# Patient Record
Sex: MALE | Race: WHITE | NOT HISPANIC OR LATINO | Employment: FULL TIME | URBAN - METROPOLITAN AREA
[De-identification: names, ages, dates, MRNs, and addresses within clinical notes are randomized per-mention and may not be internally consistent; named-entity substitution may affect disease eponyms.]

---

## 2022-02-07 ENCOUNTER — OFFICE VISIT (OUTPATIENT)
Dept: ORTHOPEDIC SURGERY | Facility: CLINIC | Age: 62
End: 2022-02-07

## 2022-02-07 VITALS — TEMPERATURE: 96.9 F | BODY MASS INDEX: 32.27 KG/M2 | WEIGHT: 265 LBS | HEIGHT: 76 IN

## 2022-02-07 DIAGNOSIS — M19.011 PRIMARY LOCALIZED OSTEOARTHROSIS OF RIGHT SHOULDER REGION: Primary | ICD-10-CM

## 2022-02-07 PROCEDURE — 20610 DRAIN/INJ JOINT/BURSA W/O US: CPT | Performed by: ORTHOPAEDIC SURGERY

## 2022-02-07 PROCEDURE — 99204 OFFICE O/P NEW MOD 45 MIN: CPT | Performed by: ORTHOPAEDIC SURGERY

## 2022-02-07 PROCEDURE — 73030 X-RAY EXAM OF SHOULDER: CPT | Performed by: ORTHOPAEDIC SURGERY

## 2022-02-07 RX ORDER — MELOXICAM 15 MG/1
TABLET ORAL
Qty: 30 TABLET | Refills: 0 | Status: SHIPPED | OUTPATIENT
Start: 2022-02-07 | End: 2022-03-01

## 2022-02-07 RX ORDER — CITALOPRAM 20 MG/1
20 TABLET ORAL DAILY
COMMUNITY
End: 2022-04-18 | Stop reason: SDUPTHER

## 2022-02-07 RX ORDER — LISINOPRIL 10 MG/1
10 TABLET ORAL DAILY
COMMUNITY
End: 2022-04-18 | Stop reason: SDUPTHER

## 2022-02-07 RX ORDER — GABAPENTIN 400 MG/1
400 CAPSULE ORAL DAILY
COMMUNITY
Start: 2021-09-15 | End: 2022-07-14 | Stop reason: SDUPTHER

## 2022-02-07 RX ORDER — ATORVASTATIN CALCIUM 20 MG/1
1 TABLET, FILM COATED ORAL DAILY
COMMUNITY
Start: 2021-09-15 | End: 2022-04-18 | Stop reason: SDUPTHER

## 2022-02-07 RX ORDER — GLIPIZIDE 10 MG/1
10 TABLET, FILM COATED, EXTENDED RELEASE ORAL
COMMUNITY
Start: 2021-09-15 | End: 2022-03-29

## 2022-02-07 RX ADMIN — TRIAMCINOLONE ACETONIDE 40 MG: 40 INJECTION, SUSPENSION INTRA-ARTICULAR; INTRAMUSCULAR at 15:27

## 2022-02-07 NOTE — PROGRESS NOTES
New  Right Shoulder      Patient: Collin Sandoval        YOB: 1960    Medical Record Number: [id number]        Chief Complaints: Right shoulder pain       History of Present Illness: This is a 61-year-old male who presents complaining of right shoulder pain has been ongoing about 4 years but within the last year symptoms are worse he is a  he has significant at night pain he played football in high school and had shoulder injuries at that time.  His pain is an 8 out of 10 moderate intermittent shooting worse with activity somewhat better with rest past medical history is unremarkable      Allergies: No Known Allergies    Medications:   Home Medications:  Current Outpatient Medications on File Prior to Visit   Medication Sig   • atorvastatin (LIPITOR) 20 MG tablet Take 1 tablet by mouth Daily.   • citalopram (CeleXA) 20 MG tablet Take 20 mg by mouth Daily.   • empagliflozin (Jardiance) 25 MG tablet tablet Take 25 mg by mouth Daily.   • gabapentin (NEURONTIN) 400 MG capsule Take 400 mg by mouth.   • glipizide (GLUCOTROL XL) 10 MG 24 hr tablet Take 10 mg by mouth.   • lisinopril (PRINIVIL,ZESTRIL) 10 MG tablet Take 10 mg by mouth Daily.   • metFORMIN (GLUCOPHAGE) 1000 MG tablet Take 1 tablet by mouth 2 (Two) Times a Day.     No current facility-administered medications on file prior to visit.     Current Medications:  Scheduled Meds:  Continuous Infusions:No current facility-administered medications for this visit.    PRN Meds:.    Past Medical History:   Diagnosis Date   • Asthma    • Diabetes (HCC)       History reviewed. No pertinent surgical history.     Social History     Occupational History   • Not on file   Tobacco Use   • Smoking status: Never Smoker   • Smokeless tobacco: Never Used   Vaping Use   • Vaping Use: Never used   Substance and Sexual Activity   • Alcohol use: Yes     Comment: 2   • Drug use: Defer   • Sexual activity: Defer      Social History     Social History  Occupational Therapy  Visit Type: treatment  Precautions:  Medical precautions: ; standard precautions.   Lines:     Basic: capped IV      Lines in chart and on patient reviewed, cautions maintained throughout session.  Hearing: no hearing deficits    SUBJECTIVE                                                                                                            Patient agreed to participate in therapy this date.  Pt stated \" I have not had a bowel movement in a couple days and Im worried\" ( alerted nursing on comment)  Patient / Family Goal: return to previous functional status      OBJECTIVE                                                                                                              Level of consciousness: alert    Oriented to person, place and time     Arousal alertness: appropriate responses to stimuli    Affect/Behavior: alert, cooperative and pleasant  Patient activity tolerance: 1 to 1 activity to rest  Functional Communication/Cognition       Form of communication:  Verbal   Attention span:  Appears intact    Commands: follows all commands and directions consistently.    Safety judgement: decreased awareness of need for assistance and decreased awareness of need for safety.    Awareness of deficits: decreased awareness of deficits.  Hand Dominance: right  Upper Extremity Function: Left: functional  Right: functional    Transfers:    Assistive devices: gait belt and 2-wheeled walker    Sit to stand: supervision    Stand to sit: supervision  Training completed:    Tasks: stand pivot  Activities of Daily Living (ADLs):  Grooming/Oral Hygiene:     Grooming assist: set up    Oral hygiene assist: set up    Position: chair  Upper Body Dressing:    Assist: set up    Position: chair  Lower Body Dressing:     Footwear assistance: maximal assist             ASSESSMENT                                                                                                                Impairments: activity  "Narrative   • Not on file      History reviewed. No pertinent family history.          Review of Systems: 14 point review of systems are remarkable for shoulder pain only the remainder negative per the patient    Review of Systems      Physical Exam: 61 y.o. male  General Appearance:    Alert, cooperative, in no acute distress                   Vitals:    02/07/22 1507   Temp: 96.9 °F (36.1 °C)   Weight: 120 kg (265 lb)   Height: 193 cm (76\")      Patient is alert and read ×3 no acute distress appears her above-listed at height weight and age.  Affect is normal respiratory rate is normal unlabored. Heart rate regular rate rhythm, sclera, dentition and hearing are normal for the purpose of this exam.    Ortho Exam  Physical exam of the right shoulder reveals no overlying skin changes no lymphedema no lymphadenopathy.  Patient has active flexion 180 with mild symptoms abduction is similar external rotation is to 50 and internal rotation to the upper lumbar spine with mild symptoms.  Patient has good rotator cuff strength 4+ over 5 with isometric strength testing with pain.  Patient has a positive impingement and a positive Manuel sign.  Patient has good cervical range of motion which is full and asymptomatic no radicular symptoms.  Patient has a normal elbow exam.  Good distal pulses are present  Patient has pain with overhead activity and a positive Neer sign and a positive empty can sign , a positive drop arm and a definitive painful arc  Large Joint Arthrocentesis: R glenohumeral  Date/Time: 2/7/2022 3:27 PM  Consent given by: patient  Site marked: site marked  Timeout: Immediately prior to procedure a time out was called to verify the correct patient, procedure, equipment, support staff and site/side marked as required   Supporting Documentation  Indications: pain   Procedure Details  Location: shoulder - R glenohumeral  Preparation: Patient was prepped and draped in the usual sterile fashion  Needle gauge: " tolerance, balance, decreased insight into deficits, safety awareness and strength  Functional Limitations: bed mobility, functional mobility, grooming, bathing, toileting, functional transfers and dressing  Patient seen on 3mesg Nursing Unit.     Today's treatment session focused on sponge bathing, dressing, toileting, oral cares, grooming. Pt very concerned about not being able to have a bowel movement. Nursing notified. Also, Pt with open areas in groin area - nursing alerted. Pt is at a set-up for UE and total assist with LB sponge bathing due to habitus. Pt with good activity tolerance. . The patient now presents with impairments in activity tolerance, safety awareness and strength.      Skilled OT indicated to address the above deficits.        Discharge Recommendations:  Recommendations for Discharge: OT WI: 24 Hour assist, Home, Home therapy      PT/OT Mobility Equipment for Discharge: Has a new 4ww from family, not a viktoria walker so may require new equipment  PT/OT ADL Equipment for Discharge: monitor needs may need training with AE for lower body  OT Identified Barriers to Discharge: generalized weakness, decreased activity tolerance           Skilled therapy is required to address these limitations in attempt to maximize the patient's independence.  Progress: slow progress, decreased activity tolerance    End of Session:   Location: in chair  Safety measures: call light within reach    PLAN                                                                                                                          Suggestions for next session as indicated: Spongebathing, LE dressing  OT Frequency: 6 days/week  Frequency Comments: 1/6 by 11/19 ( 11/12 EG)  Sat or Sun  Interventions: activity tolerance training, ADL retraining, balance, bed mobility training, functional transfer training and safety training  Agreement to plan and goals: patient agrees with goals and treatment plan      GOALS:  Review Date:  11/19/2020  Long Term Goals: (to be met by time of discharge from hospital)  Lower body dressing: Patient will complete lower body dressing modified independent.  Toileting: Patient will complete toileting modified independent.  Bathing: Patient will complete bathingmodified independent Toilet transfer: Patient will complete toilet transfer with 2-wheeled walker, modified independent.   Home setting transfer: Patient will complete home setting transfers with 2-wheeled walker, modified independent.         Documented in the chart in the following areas: Assessment. Plan.       21G.  Approach: posterior  Medications administered: 4 mL lidocaine (cardiac); 40 mg triamcinolone acetonide 40 MG/ML  Patient tolerance: patient tolerated the procedure well with no immediate complications                Radiology:   AP, Scapular Y and Axillary Lateral of the right shoulder were ordered/reviewed to evauate shoulder pain.  I have no comparative films h he does have fairly significant arthritis with narrowing the joint space and osteophyte formation I have no comparative films    Assessment/Plan: Right shoulder pain is been ongoing for several years worse recently he does have significant degenerative changes.  Talked about options.  I really think his options are to 1 to be glenohumeral injection if and when that fails to give him adequate relief he be candidate for shoulder replacement plan is to proceed with a glenohumeral injection today if that helps he can get those intermittently.  I would also like him to sit on have a discussion with Dr. Moe regarding arthroplasty he does understand with his diabetes his risk of the injection is a bit higher  Cortisone Injection. See procedure note.  Cortisone Injection for DIAGNOSTIC and THERAPUTIC purposes.

## 2022-02-17 RX ORDER — TRIAMCINOLONE ACETONIDE 40 MG/ML
40 INJECTION, SUSPENSION INTRA-ARTICULAR; INTRAMUSCULAR
Status: COMPLETED | OUTPATIENT
Start: 2022-02-07 | End: 2022-02-07

## 2022-03-01 RX ORDER — MELOXICAM 15 MG/1
TABLET ORAL
Qty: 30 TABLET | Refills: 0 | Status: SHIPPED | OUTPATIENT
Start: 2022-03-01 | End: 2022-03-28

## 2022-03-18 NOTE — PROGRESS NOTES
"Collin Sandoval presents to [facility] with complaint of  Establish Care and Diabetes    SUBJECTIVE  History of Present Illness     The patient is here to establish relations, he recently moved to the area needs primary care.     His last A1c was 8.7 that he recalls. He does not check his BG regularly.  He says his blood pressure runs normal when he checks it at home.    UTD on covid shot, he is unsure if he has had pneumonia vaccine, he had a flu shot this flue season as well. Never had vaccine for shingles.    colonoscopy in 2020-rec repeat in 5 years as polyps were removed.     The patient's past medical, surgical and family history were reviewed and updated as needed in the chart. Routine health screenings and immunizations were reviewed as well.   OBJECTIVE  Vital Signs:   /78 (BP Location: Left arm, Patient Position: Sitting)   Pulse 75   Temp 97.3 °F (36.3 °C) (Oral)   Ht 193 cm (76\")   Wt 126 kg (277 lb)   BMI 33.72 kg/m²       Physical Exam  Constitutional:       General: He is not in acute distress.     Appearance: Normal appearance. He is not ill-appearing.      Comments: Morbidly obese   HENT:      Head: Normocephalic and atraumatic.      Nose: Nose normal.      Mouth/Throat:      Mouth: Mucous membranes are moist.   Cardiovascular:      Rate and Rhythm: Normal rate and regular rhythm.      Pulses: Normal pulses.      Heart sounds: Normal heart sounds.   Pulmonary:      Effort: Pulmonary effort is normal. No respiratory distress.      Breath sounds: Normal breath sounds.   Chest:      Chest wall: No tenderness.   Musculoskeletal:         General: Normal range of motion.      Cervical back: Normal range of motion and neck supple.   Feet:      Right foot:      Protective Sensation: 7 sites tested. 7 sites sensed.      Skin integrity: Skin integrity normal.      Toenail Condition: Right toenails are normal.      Left foot:      Protective Sensation: 7 sites tested. 7 " sites sensed.      Skin integrity: Skin integrity normal.      Toenail Condition: Left toenails are normal.   Skin:     General: Skin is warm and dry.      Capillary Refill: Capillary refill takes less than 2 seconds.   Neurological:      General: No focal deficit present.      Mental Status: He is alert and oriented to person, place, and time. Mental status is at baseline.   Psychiatric:         Mood and Affect: Mood normal.         Behavior: Behavior normal.          Results Review:  The following data was reviewed by BILL Baker [unfilled] 13:04 EDT.         Procedures     ASSESSMENT AND PLAN:  Diagnoses and all orders for this visit:    1. Type 2 diabetes mellitus with hyperglycemia, without long-term current use of insulin (HCC) (Primary)  Assessment & Plan:  -An order was placed to check A1c, and urine microalbumin  -For now he will continue Jardiance 25 mg daily, glipizide 10 mg daily, and Metformin 1000 mg twice a day  -He is on preventative medication such as atorvastatin and lisinopril  -Diabetic foot exam performed today and was normal  -He says he sees eye care every 2 years.  Stressed the importance of him to see eye specialst every year    Orders:  -     Comprehensive Metabolic Panel; Future  -     Hemoglobin A1c; Future  -     Cancel: MicroAlbumin, Urine, Random - Urine, Clean Catch; Future  -     MicroAlbumin, Urine, Random - Urine, Clean Catch; Future    2. Peripheral polyneuropathy  Assessment & Plan:  -The patient verbalizes that this is well managed  -Continue gabapentin 400 mg once a day      3. Screening for thyroid disorder  -     TSH; Future    4. Screening for lipid disorders  -     Lipid Panel; Future    5. Encounter for medical examination to establish care    6. Fatigue, unspecified type  -     CBC & Differential; Future    7. Screening for malignant neoplasm of prostate  -     PSA Screen; Future    8. Encounter for hepatitis C screening test for low risk patient  -     Hepatitis C  antibody; Future    9. Primary osteoarthritis, unspecified site  Assessment & Plan:  -Well-controlled-continue Mobic as needed        10. Anxiety  Assessment & Plan:  -The patient is on citalopram 20 mg once a day and this controls his anxiety well  -He is also on Xanax 1 mg.  He verbalizes that he only takes his medication about once or twice a month as needed      11. Morbid (severe) obesity due to excess calories (HCC)  Assessment & Plan:  Patient was counseled to eat low fat, low carb diet limiting fried food/sweets and increase fruits/vegetables/whole grains.  Patient was also encouraged to implement moderate intensity exercise as tolerated.      He does not need any refills on his medication today.       Follow Up   Return in about 6 months (around 9/21/2022). Patient to notify office with any acute concerns or issues.  Patient verbalizes understanding, agrees with plan of care and has no further questions upon discharge. The patient was instructed to dial 911/seek ER care if he develops SOA, CP, uncontrolled fever N/VD or any other untoward symptoms.     Patient was given instructions and counseling regarding his condition or for health maintenance advice. Please see specific information pulled into the AVS if appropriate.

## 2022-03-21 ENCOUNTER — OFFICE VISIT (OUTPATIENT)
Dept: FAMILY MEDICINE CLINIC | Age: 62
End: 2022-03-21

## 2022-03-21 VITALS
WEIGHT: 277 LBS | DIASTOLIC BLOOD PRESSURE: 78 MMHG | BODY MASS INDEX: 33.73 KG/M2 | HEIGHT: 76 IN | HEART RATE: 75 BPM | SYSTOLIC BLOOD PRESSURE: 126 MMHG | TEMPERATURE: 97.3 F

## 2022-03-21 DIAGNOSIS — Z13.220 SCREENING FOR LIPID DISORDERS: ICD-10-CM

## 2022-03-21 DIAGNOSIS — Z12.5 SCREENING FOR MALIGNANT NEOPLASM OF PROSTATE: ICD-10-CM

## 2022-03-21 DIAGNOSIS — E66.01 MORBID (SEVERE) OBESITY DUE TO EXCESS CALORIES: ICD-10-CM

## 2022-03-21 DIAGNOSIS — G62.9 PERIPHERAL POLYNEUROPATHY: ICD-10-CM

## 2022-03-21 DIAGNOSIS — Z13.29 SCREENING FOR THYROID DISORDER: ICD-10-CM

## 2022-03-21 DIAGNOSIS — F41.9 ANXIETY: ICD-10-CM

## 2022-03-21 DIAGNOSIS — Z11.59 ENCOUNTER FOR HEPATITIS C SCREENING TEST FOR LOW RISK PATIENT: ICD-10-CM

## 2022-03-21 DIAGNOSIS — M19.91 PRIMARY OSTEOARTHRITIS, UNSPECIFIED SITE: ICD-10-CM

## 2022-03-21 DIAGNOSIS — E11.65 TYPE 2 DIABETES MELLITUS WITH HYPERGLYCEMIA, WITHOUT LONG-TERM CURRENT USE OF INSULIN: Primary | ICD-10-CM

## 2022-03-21 DIAGNOSIS — Z00.00 ENCOUNTER FOR MEDICAL EXAMINATION TO ESTABLISH CARE: ICD-10-CM

## 2022-03-21 DIAGNOSIS — R53.83 FATIGUE, UNSPECIFIED TYPE: ICD-10-CM

## 2022-03-21 PROCEDURE — 99204 OFFICE O/P NEW MOD 45 MIN: CPT | Performed by: NURSE PRACTITIONER

## 2022-03-21 RX ORDER — ALPRAZOLAM 1 MG/1
1 TABLET ORAL 3 TIMES DAILY PRN
COMMUNITY
Start: 2022-01-08

## 2022-03-21 NOTE — ASSESSMENT & PLAN NOTE
-The patient is on citalopram 20 mg once a day and this controls his anxiety well  -He is also on Xanax 1 mg.  He verbalizes that he only takes his medication about once or twice a month as needed

## 2022-03-21 NOTE — ASSESSMENT & PLAN NOTE
Patient was counseled to eat low fat, low carb diet limiting fried food/sweets and increase fruits/vegetables/whole grains.  Patient was also encouraged to implement moderate intensity exercise as tolerated.

## 2022-03-21 NOTE — ASSESSMENT & PLAN NOTE
-An order was placed to check A1c, and urine microalbumin  -For now he will continue Jardiance 25 mg daily, glipizide 10 mg daily, and Metformin 1000 mg twice a day  -He is on preventative medication such as atorvastatin and lisinopril  -Diabetic foot exam performed today and was normal  -He says he sees eye care every 2 years.  Stressed the importance of him to see eye specialst every year

## 2022-03-25 ENCOUNTER — PATIENT ROUNDING (BHMG ONLY) (OUTPATIENT)
Dept: FAMILY MEDICINE CLINIC | Age: 62
End: 2022-03-25

## 2022-03-25 DIAGNOSIS — M19.011 PRIMARY LOCALIZED OSTEOARTHROSIS OF RIGHT SHOULDER REGION: Primary | ICD-10-CM

## 2022-03-25 NOTE — PROGRESS NOTES
March 25, 2022    Hello, may I speak with Collin Sandoval?    My name is Kathy Sellers, VINICIUS     I am  with [facility]  [address] [facility] [address]  [place] [address] [phone].    Before we get started may I verify your date of birth? 1960    I am calling to officially welcome you to our practice and ask about your recent visit. Is this a good time to talk? yes    Tell me about your visit with us. What things went well?  Everything went good, new to the area and needed to set up a primary provider.    We're always looking for ways to make our patients' experiences even better. Do you have recommendations on ways we may improve?  no    Overall were you satisfied with your first visit to our practice? yes       I appreciate you taking the time to speak with me today. Is there anything else I can do for you? no      Thank you, and have a great day.

## 2022-03-28 ENCOUNTER — LAB (OUTPATIENT)
Dept: LAB | Facility: HOSPITAL | Age: 62
End: 2022-03-28

## 2022-03-28 DIAGNOSIS — Z11.59 ENCOUNTER FOR HEPATITIS C SCREENING TEST FOR LOW RISK PATIENT: ICD-10-CM

## 2022-03-28 DIAGNOSIS — Z12.5 SCREENING FOR MALIGNANT NEOPLASM OF PROSTATE: ICD-10-CM

## 2022-03-28 DIAGNOSIS — Z13.29 SCREENING FOR THYROID DISORDER: ICD-10-CM

## 2022-03-28 DIAGNOSIS — E11.65 TYPE 2 DIABETES MELLITUS WITH HYPERGLYCEMIA, WITHOUT LONG-TERM CURRENT USE OF INSULIN: ICD-10-CM

## 2022-03-28 DIAGNOSIS — Z13.220 SCREENING FOR LIPID DISORDERS: ICD-10-CM

## 2022-03-28 DIAGNOSIS — R53.83 FATIGUE, UNSPECIFIED TYPE: ICD-10-CM

## 2022-03-28 LAB
ALBUMIN SERPL-MCNC: 4.7 G/DL (ref 3.5–5.2)
ALBUMIN UR-MCNC: 2.5 MG/DL
ALBUMIN/GLOB SERPL: 1.6 G/DL
ALP SERPL-CCNC: 88 U/L (ref 39–117)
ALT SERPL W P-5'-P-CCNC: 13 U/L (ref 1–41)
ANION GAP SERPL CALCULATED.3IONS-SCNC: 12.5 MMOL/L (ref 5–15)
AST SERPL-CCNC: 15 U/L (ref 1–40)
BASOPHILS # BLD AUTO: 0.05 10*3/MM3 (ref 0–0.2)
BASOPHILS NFR BLD AUTO: 0.7 % (ref 0–1.5)
BILIRUB SERPL-MCNC: 0.4 MG/DL (ref 0–1.2)
BUN SERPL-MCNC: 18 MG/DL (ref 8–23)
BUN/CREAT SERPL: 17.8 (ref 7–25)
CALCIUM SPEC-SCNC: 9.9 MG/DL (ref 8.6–10.5)
CHLORIDE SERPL-SCNC: 101 MMOL/L (ref 98–107)
CHOLEST SERPL-MCNC: 153 MG/DL (ref 0–200)
CO2 SERPL-SCNC: 25.5 MMOL/L (ref 22–29)
CREAT SERPL-MCNC: 1.01 MG/DL (ref 0.76–1.27)
DEPRECATED RDW RBC AUTO: 46.7 FL (ref 37–54)
EGFRCR SERPLBLD CKD-EPI 2021: 85.1 ML/MIN/1.73
EOSINOPHIL # BLD AUTO: 0.4 10*3/MM3 (ref 0–0.4)
EOSINOPHIL NFR BLD AUTO: 5.7 % (ref 0.3–6.2)
ERYTHROCYTE [DISTWIDTH] IN BLOOD BY AUTOMATED COUNT: 14.2 % (ref 12.3–15.4)
GLOBULIN UR ELPH-MCNC: 2.9 GM/DL
GLUCOSE SERPL-MCNC: 208 MG/DL (ref 65–99)
HBA1C MFR BLD: 9.8 % (ref 4.8–5.6)
HCT VFR BLD AUTO: 47.2 % (ref 37.5–51)
HCV AB SER DONR QL: NORMAL
HDLC SERPL-MCNC: 32 MG/DL (ref 40–60)
HGB BLD-MCNC: 15.3 G/DL (ref 13–17.7)
IMM GRANULOCYTES # BLD AUTO: 0.02 10*3/MM3 (ref 0–0.05)
IMM GRANULOCYTES NFR BLD AUTO: 0.3 % (ref 0–0.5)
LDLC SERPL CALC-MCNC: 91 MG/DL (ref 0–100)
LDLC/HDLC SERPL: 2.69 {RATIO}
LYMPHOCYTES # BLD AUTO: 2.12 10*3/MM3 (ref 0.7–3.1)
LYMPHOCYTES NFR BLD AUTO: 30.2 % (ref 19.6–45.3)
MCH RBC QN AUTO: 29 PG (ref 26.6–33)
MCHC RBC AUTO-ENTMCNC: 32.4 G/DL (ref 31.5–35.7)
MCV RBC AUTO: 89.6 FL (ref 79–97)
MONOCYTES # BLD AUTO: 0.43 10*3/MM3 (ref 0.1–0.9)
MONOCYTES NFR BLD AUTO: 6.1 % (ref 5–12)
NEUTROPHILS NFR BLD AUTO: 4.01 10*3/MM3 (ref 1.7–7)
NEUTROPHILS NFR BLD AUTO: 57 % (ref 42.7–76)
PLATELET # BLD AUTO: 226 10*3/MM3 (ref 140–450)
PMV BLD AUTO: 10.2 FL (ref 6–12)
POTASSIUM SERPL-SCNC: 4.8 MMOL/L (ref 3.5–5.2)
PROT SERPL-MCNC: 7.6 G/DL (ref 6–8.5)
PSA SERPL-MCNC: 0.55 NG/ML (ref 0–4)
RBC # BLD AUTO: 5.27 10*6/MM3 (ref 4.14–5.8)
SODIUM SERPL-SCNC: 139 MMOL/L (ref 136–145)
TRIGL SERPL-MCNC: 174 MG/DL (ref 0–150)
TSH SERPL DL<=0.05 MIU/L-ACNC: 2.14 UIU/ML (ref 0.27–4.2)
VLDLC SERPL-MCNC: 30 MG/DL (ref 5–40)
WBC NRBC COR # BLD: 7.03 10*3/MM3 (ref 3.4–10.8)

## 2022-03-28 PROCEDURE — 83036 HEMOGLOBIN GLYCOSYLATED A1C: CPT

## 2022-03-28 PROCEDURE — 82043 UR ALBUMIN QUANTITATIVE: CPT

## 2022-03-28 PROCEDURE — 86803 HEPATITIS C AB TEST: CPT

## 2022-03-28 PROCEDURE — G0103 PSA SCREENING: HCPCS

## 2022-03-28 PROCEDURE — 80050 GENERAL HEALTH PANEL: CPT

## 2022-03-28 PROCEDURE — 36415 COLL VENOUS BLD VENIPUNCTURE: CPT

## 2022-03-28 PROCEDURE — 80061 LIPID PANEL: CPT

## 2022-03-28 RX ORDER — MELOXICAM 15 MG/1
TABLET ORAL
Qty: 30 TABLET | Refills: 0 | Status: SHIPPED | OUTPATIENT
Start: 2022-03-28 | End: 2022-05-02

## 2022-03-29 RX ORDER — GLIPIZIDE 10 MG/1
20 TABLET, FILM COATED, EXTENDED RELEASE ORAL DAILY
Qty: 180 TABLET | Refills: 0 | Status: SHIPPED | OUTPATIENT
Start: 2022-03-29 | End: 2022-04-18 | Stop reason: SDUPTHER

## 2022-04-18 DIAGNOSIS — E11.65 TYPE 2 DIABETES MELLITUS WITH HYPERGLYCEMIA, WITHOUT LONG-TERM CURRENT USE OF INSULIN: ICD-10-CM

## 2022-04-18 RX ORDER — ATORVASTATIN CALCIUM 20 MG/1
20 TABLET, FILM COATED ORAL DAILY
Qty: 90 TABLET | Refills: 1 | Status: SHIPPED | OUTPATIENT
Start: 2022-04-18 | End: 2022-07-18 | Stop reason: SDUPTHER

## 2022-04-18 RX ORDER — CITALOPRAM 20 MG/1
20 TABLET ORAL DAILY
Qty: 90 TABLET | Refills: 1 | Status: SHIPPED | OUTPATIENT
Start: 2022-04-18 | End: 2022-07-18 | Stop reason: SDUPTHER

## 2022-04-18 RX ORDER — GLIPIZIDE 10 MG/1
20 TABLET, FILM COATED, EXTENDED RELEASE ORAL DAILY
Qty: 180 TABLET | Refills: 1 | Status: SHIPPED | OUTPATIENT
Start: 2022-04-18 | End: 2022-07-18 | Stop reason: SDUPTHER

## 2022-04-18 RX ORDER — LISINOPRIL 10 MG/1
10 TABLET ORAL DAILY
Qty: 90 TABLET | Refills: 1 | Status: SHIPPED | OUTPATIENT
Start: 2022-04-18 | End: 2022-07-18 | Stop reason: SDUPTHER

## 2022-04-18 NOTE — TELEPHONE ENCOUNTER
Pt requesting refills on pending medications. Seen by you 3/3/22, looks like metformin was refilled for #60 on 3/3/22 also. And Jardiance, however the atorvastatin, lisinopril, and citalopram were not. He did complete labs a few weeks ago. Ok to send in refills? Please adv.

## 2022-04-18 NOTE — TELEPHONE ENCOUNTER
Caller: Collin Sandoval    Relationship: Self    Best call back number: [phone] (    Requested Prescriptions:   Requested Prescriptions     Pending Prescriptions Disp Refills   • metFORMIN (GLUCOPHAGE) 1000 MG tablet       Sig: Take 1 tablet by mouth 2 (Two) Times a Day.   • lisinopril (PRINIVIL,ZESTRIL) 10 MG tablet       Sig: Take 1 tablet by mouth Daily.   • atorvastatin (LIPITOR) 20 MG tablet       Sig: Take 1 tablet by mouth Daily.   • citalopram (CeleXA) 20 MG tablet       Sig: Take 1 tablet by mouth Daily.        Pharmacy where request should be sent: [id number] - [place], [place] - [address] AT [facility] [address] [facility] - [phone] [facility] [phone] FX     Additional details provided by patient:     Does the patient have less than a 3 day supply:  [x] Yes  [] No    Jase Valerio Rep   04/18/22 13:17 EDT

## 2022-04-30 DIAGNOSIS — M19.011 PRIMARY LOCALIZED OSTEOARTHROSIS OF RIGHT SHOULDER REGION: ICD-10-CM

## 2022-05-02 RX ORDER — MELOXICAM 15 MG/1
TABLET ORAL
Qty: 30 TABLET | Refills: 0 | Status: SHIPPED | OUTPATIENT
Start: 2022-05-02 | End: 2022-09-23

## 2022-07-13 NOTE — TELEPHONE ENCOUNTER
Caller: Collin Sandoval    Relationship: Self    Best call back number: [phone]    What is the best time to reach you: ANYTIME     Who are you requesting to speak with (clinical staff, provider,  specific staff member): CLINICAL         What was the call regarding: PATIENT ALSO CALLING REQUESTING FOR A REFILL FOR A NEBULIZER AND SOLUTION, AND INHALER  NOT LISTED MEDICATION LIST.     Do you require a callback: YES

## 2022-07-13 NOTE — TELEPHONE ENCOUNTER
Caller: Collin Sandoval    Relationship: Self    Best call back number: [phone]    Requested Prescriptions:   Requested Prescriptions     Pending Prescriptions Disp Refills   • gabapentin (NEURONTIN) 400 MG capsule       Sig: Take 1 capsule by mouth Daily.        Pharmacy where request should be sent: [id number] - [place], [place] - [address] AT [facility] [address] [facility] - [phone]  - [phone] FX     Additional details provided by patient: COMPLETELY OUT OF MEDICATION     Does the patient have less than a 3 day supply:  [x] Yes  [] No    Jase Clark Rep   07/13/22 11:44 EDT

## 2022-07-14 ENCOUNTER — CLINICAL SUPPORT (OUTPATIENT)
Dept: FAMILY MEDICINE CLINIC | Age: 62
End: 2022-07-14

## 2022-07-14 DIAGNOSIS — Z79.899 ENCOUNTER FOR LONG-TERM (CURRENT) USE OF HIGH-RISK MEDICATION: Primary | ICD-10-CM

## 2022-07-14 DIAGNOSIS — G62.9 PERIPHERAL POLYNEUROPATHY: Primary | ICD-10-CM

## 2022-07-14 PROCEDURE — 80305 DRUG TEST PRSMV DIR OPT OBS: CPT | Performed by: NURSE PRACTITIONER

## 2022-07-14 RX ORDER — GABAPENTIN 400 MG/1
400 CAPSULE ORAL DAILY
Qty: 90 CAPSULE | Refills: 0 | Status: SHIPPED | OUTPATIENT
Start: 2022-07-14 | End: 2022-08-10 | Stop reason: SDUPTHER

## 2022-07-14 RX ORDER — ALBUTEROL SULFATE 2.5 MG/3ML
2.5 SOLUTION RESPIRATORY (INHALATION) EVERY 4 HOURS PRN
Qty: 30 ML | Refills: 12 | Status: SHIPPED | OUTPATIENT
Start: 2022-07-14

## 2022-07-14 RX ORDER — GABAPENTIN 400 MG/1
400 CAPSULE ORAL DAILY
Qty: 90 CAPSULE | Refills: 0 | OUTPATIENT
Start: 2022-07-14

## 2022-07-14 RX ORDER — ALBUTEROL SULFATE 90 UG/1
2 AEROSOL, METERED RESPIRATORY (INHALATION) EVERY 4 HOURS PRN
Qty: 18 G | Refills: 0 | Status: SHIPPED | OUTPATIENT
Start: 2022-07-14 | End: 2022-12-27

## 2022-07-18 DIAGNOSIS — E11.65 TYPE 2 DIABETES MELLITUS WITH HYPERGLYCEMIA, WITHOUT LONG-TERM CURRENT USE OF INSULIN: ICD-10-CM

## 2022-07-18 DIAGNOSIS — G62.9 PERIPHERAL POLYNEUROPATHY: ICD-10-CM

## 2022-07-18 DIAGNOSIS — M19.011 PRIMARY LOCALIZED OSTEOARTHROSIS OF RIGHT SHOULDER REGION: ICD-10-CM

## 2022-07-18 RX ORDER — MELOXICAM 15 MG/1
15 TABLET ORAL DAILY
Qty: 30 TABLET | Refills: 0 | OUTPATIENT
Start: 2022-07-18

## 2022-07-18 RX ORDER — GLIPIZIDE 10 MG/1
20 TABLET, FILM COATED, EXTENDED RELEASE ORAL DAILY
Qty: 180 TABLET | Refills: 1 | Status: SHIPPED | OUTPATIENT
Start: 2022-07-18 | End: 2023-01-23

## 2022-07-18 RX ORDER — LISINOPRIL 10 MG/1
10 TABLET ORAL DAILY
Qty: 90 TABLET | Refills: 1 | Status: SHIPPED | OUTPATIENT
Start: 2022-07-18 | End: 2023-01-23

## 2022-07-18 RX ORDER — ATORVASTATIN CALCIUM 20 MG/1
20 TABLET, FILM COATED ORAL DAILY
Qty: 90 TABLET | Refills: 1 | Status: SHIPPED | OUTPATIENT
Start: 2022-07-18 | End: 2023-01-23

## 2022-07-18 RX ORDER — GABAPENTIN 400 MG/1
400 CAPSULE ORAL DAILY
Qty: 90 CAPSULE | Refills: 0 | OUTPATIENT
Start: 2022-07-18

## 2022-07-18 RX ORDER — CITALOPRAM 20 MG/1
20 TABLET ORAL DAILY
Qty: 90 TABLET | Refills: 1 | Status: SHIPPED | OUTPATIENT
Start: 2022-07-18 | End: 2023-01-23

## 2022-07-18 NOTE — TELEPHONE ENCOUNTER
Caller: Collin Sandoval    Relationship: Self    Best call back number: [phone]    Requested Prescriptions:   Requested Prescriptions     Pending Prescriptions Disp Refills   • lisinopril (PRINIVIL,ZESTRIL) 10 MG tablet 90 tablet 1     Sig: Take 1 tablet by mouth Daily.   • gabapentin (NEURONTIN) 400 MG capsule 90 capsule 0     Sig: Take 1 capsule by mouth Daily.   • atorvastatin (LIPITOR) 20 MG tablet 90 tablet 1     Sig: Take 1 tablet by mouth Daily.   • metFORMIN (GLUCOPHAGE) 1000 MG tablet 180 tablet 1     Sig: Take 1 tablet by mouth 2 (Two) Times a Day.   • glipizide (Glucotrol XL) 10 MG 24 hr tablet 180 tablet 1     Sig: Take 2 tablets by mouth Daily. Correct sig: Take 2 tabs PO once per day   • empagliflozin (JARDIANCE) 25 MG tablet tablet 90 tablet 1     Sig: Take 1 tablet by mouth Daily.   • citalopram (CeleXA) 20 MG tablet 90 tablet 1     Sig: Take 1 tablet by mouth Daily.   • meloxicam (MOBIC) 15 MG tablet 30 tablet 0     Sig: Take 1 tablet by mouth Daily. with food        Pharmacy where request should be sent: [facility] #[id number] - [place], [place] - [address] AT [facility] [address] [facility] - [phone] [facility] [phone] FX     Additional details provided by patient: 3 DAYS LEFT     Does the patient have less than a 3 day supply:  [x] Yes  [] No    Jase Rowe Rep   07/18/22 13:19 EDT

## 2022-07-26 ENCOUNTER — TELEPHONE (OUTPATIENT)
Dept: ORTHOPEDIC SURGERY | Facility: CLINIC | Age: 62
End: 2022-07-26

## 2022-07-26 NOTE — TELEPHONE ENCOUNTER
Yes he needs a shoulder replacement I would recommend Dr. Moe as well.  Please make him an appointment with Dr. Moe

## 2022-07-26 NOTE — TELEPHONE ENCOUNTER
Caller: SHAWANDA  Relationship to Patient: SELF    Phone Number: [phone]  Reason for Call: PT CALLED STATING THAT HE SAW DR DAVIDSON IN February FOR A SHOULDER ISSUE. PT STATES THAT SHE GAVE HIM AND INJECTION THAT WAS NOT HELPFUL AND ADVISED THAT HE REALLY NEEDED SURGERY. PT STATES THAT HE WOULD LIKE TO MOVE FORWARD WITH SURGERY BUT HE WANTS TO SEE DR AGUIRRE FOR HIS SHOULDER AND WANTS DR AGUIRRE TO DO THE SURGERY PER HIS DAUGHTER. PLEASE ADVISE PT AT ABOVE PHONE NUMBER

## 2022-07-27 RX ORDER — ALBUTEROL SULFATE 90 UG/1
AEROSOL, METERED RESPIRATORY (INHALATION)
Qty: 8.5 G | OUTPATIENT
Start: 2022-07-27

## 2022-08-10 ENCOUNTER — PATIENT MESSAGE (OUTPATIENT)
Dept: FAMILY MEDICINE CLINIC | Age: 62
End: 2022-08-10

## 2022-08-10 DIAGNOSIS — G62.9 PERIPHERAL POLYNEUROPATHY: ICD-10-CM

## 2022-08-11 RX ORDER — GABAPENTIN 400 MG/1
400 CAPSULE ORAL DAILY
Qty: 90 CAPSULE | Refills: 0 | Status: SHIPPED | OUTPATIENT
Start: 2022-08-11 | End: 2022-09-23

## 2022-08-12 ENCOUNTER — OFFICE VISIT (OUTPATIENT)
Dept: ORTHOPEDIC SURGERY | Facility: CLINIC | Age: 62
End: 2022-08-12

## 2022-08-12 VITALS — TEMPERATURE: 97.8 F | RESPIRATION RATE: 16 BRPM | HEIGHT: 75 IN | BODY MASS INDEX: 32.95 KG/M2 | WEIGHT: 265 LBS

## 2022-08-12 DIAGNOSIS — M19.019 ARTHRITIS OF SHOULDER: Primary | ICD-10-CM

## 2022-08-12 PROCEDURE — 99214 OFFICE O/P EST MOD 30 MIN: CPT | Performed by: ORTHOPAEDIC SURGERY

## 2022-08-12 PROCEDURE — 20610 DRAIN/INJ JOINT/BURSA W/O US: CPT | Performed by: ORTHOPAEDIC SURGERY

## 2022-08-12 RX ORDER — METHYLPREDNISOLONE ACETATE 80 MG/ML
80 INJECTION, SUSPENSION INTRA-ARTICULAR; INTRALESIONAL; INTRAMUSCULAR; SOFT TISSUE
Status: COMPLETED | OUTPATIENT
Start: 2022-08-12 | End: 2022-08-12

## 2022-08-12 RX ADMIN — METHYLPREDNISOLONE ACETATE 80 MG: 80 INJECTION, SUSPENSION INTRA-ARTICULAR; INTRALESIONAL; INTRAMUSCULAR; SOFT TISSUE at 12:20

## 2022-08-12 NOTE — PROGRESS NOTES
Patient: Collin Sandoval    YOB: 1960    Medical Record Number: [id number]    Chief Complaints:  Referral for right shoulder pain    History of Present Illness:     62 y.o. male patient who presents for evaluation of the right shoulder.  The patient is referred to me for further evaluation by Dr. Alvarado.  The patient reports a long history of worsening shoulder pain and dysfunction.  Pain is described as moderate to severe, constant and aching.  He reports difficulty with overhead activities, reaching and lifting.  He has tried and failed conservative treatment and was referred to me to discuss a possible arthroplasty.    Allergies: No Known Allergies    Home Medications:    Current Outpatient Medications:   •  albuterol (PROVENTIL) (2.5 MG/3ML) 0.083% nebulizer solution, Take 2.5 mg by nebulization Every 4 (Four) Hours As Needed for Wheezing., Disp: 30 mL, Rfl: 12  •  albuterol sulfate  (90 Base) MCG/ACT inhaler, Inhale 2 puffs Every 4 (Four) Hours As Needed for Wheezing., Disp: 18 g, Rfl: 0  •  ALPRAZolam (XANAX) 1 MG tablet, Take 1 mg by mouth., Disp: , Rfl:   •  atorvastatin (LIPITOR) 20 MG tablet, Take 1 tablet by mouth Daily., Disp: 90 tablet, Rfl: 1  •  citalopram (CeleXA) 20 MG tablet, Take 1 tablet by mouth Daily., Disp: 90 tablet, Rfl: 1  •  empagliflozin (JARDIANCE) 25 MG tablet tablet, Take 1 tablet by mouth Daily., Disp: 90 tablet, Rfl: 1  •  gabapentin (NEURONTIN) 400 MG capsule, Take 1 capsule by mouth Daily., Disp: 90 capsule, Rfl: 0  •  glipizide (Glucotrol XL) 10 MG 24 hr tablet, Take 2 tablets by mouth Daily. Correct sig: Take 2 tabs PO once per day, Disp: 180 tablet, Rfl: 1  •  lisinopril (PRINIVIL,ZESTRIL) 10 MG tablet, Take 1 tablet by mouth Daily., Disp: 90 tablet, Rfl: 1  •  metFORMIN (GLUCOPHAGE) 1000 MG tablet, Take 1 tablet by mouth 2 (Two) Times a Day., Disp: 180 tablet, Rfl: 1  •  meloxicam (MOBIC) 15 MG tablet, TAKE 1 TABLET BY MOUTH EVERY DAY WITH FOOD, Disp: 30  "tablet, Rfl: 0    Past Medical History:   Diagnosis Date   • Arthritis    • Asthma    • Diabetes (HCC)        History reviewed. No pertinent surgical history.    Social History     Occupational History   • Not on file   Tobacco Use   • Smoking status: Never Smoker   • Smokeless tobacco: Never Used   Vaping Use   • Vaping Use: Never used   Substance and Sexual Activity   • Alcohol use: Yes     Alcohol/week: 5.0 standard drinks     Types: 5 Cans of beer per week     Comment: 2 drinks twice per week   • Drug use: Never   • Sexual activity: Yes     Partners: Female      Social History     Social History Narrative   • Not on file       Family History   Problem Relation Age of Onset   • Diabetes Sister        Review of Systems:      Constitutional: Denies fever, shaking or chills   Eyes: Denies change in visual acuity   HEENT: Denies nasal congestion or sore throat   Respiratory: Denies cough or shortness of breath   Cardiovascular: Denies chest pain or edema  Endocrine: Denies tremors, palpitations, intolerance of heat or cold, polyuria, polydipsia.  GI: Denies abdominal pain, nausea, vomiting, bloody stools or diarrhea  : Denies frequency, urgency, incontinence, retention, or nocturia.  Musculoskeletal: Denies numbness, tingling or loss of motor function except as above  Integument: Denies rash, lesion or ulceration   Neurologic: Denies headache or focal weakness, deficits  Heme: Denies spontaneous or excessive bleeding, epistaxis, hematuria, melena, fatigue, enlarged or tender lymph nodes.      All other pertinent positives and negatives as noted above in HPI.    Physical Exam:   62 y.o. male    Vitals:    08/12/22 1203   Resp: 16   Temp: 97.8 °F (36.6 °C)   Weight: 120 kg (265 lb)   Height: 190.5 cm (75\")     General:  Patient is awake and alert.  Appears in no acute distress or discomfort.    Psych:  Affect and demeanor are appropriate.    Eyes:  Conjunctiva and sclera appear grossly normal.  Eyes track well and " EOM seem to be intact.    Ears:  No gross abnormalities.  Hearing adequate for the exam.    Cardiovascular:  Regular rate and rhythm.    Lungs:  Good chest expansion.  Breathing unlabored.    Extremities: Right shoulder is examined. Skin is benign.  No obvious gross abnormalities.  No palpable masses or adenopathy.  Moderate tenderness noted over anterior glenohumeral joint and rotator interval.  Motion is limited and uncomfortable--155 FE, 25 ER, back pocket IR.  No instability.  Good strength with resistive testing of the rotator cuff albeit with discomfort.  Good motor and sensory function in lower arm and hand.  Brisk capillary refill in hand.  Palpable radial pulse.  Good skin turgor.    Imaging:  Previous x-rays including AP, scapular Y and axillary views of the right shoulder are reviewed.  The x-rays show severe osteoarthritis with bone-on-bone degeneration, osteophyte formation and subchondral sclerosis.    Assessment/Plan:  Right shoulder end stage osteoarthritis    We had a long discussion about conservative and surgical treatment options.  We talked about an arthroplasty and all that would potentially entail in terms of surgery itself, rehabilitation and recovery.  He acknowledged understanding this information.  He has stated that he is not quite ready for replacement.  He would like to try another injection if at all possible.  The risk, benefits and alternatives were discussed including his elevated risk with diabetes.  He acknowledged understanding and consented.  The injection was performed as described below.  He will follow-up as needed.      Large Joint Arthrocentesis: R glenohumeral  Date/Time: 8/12/2022 12:20 PM  Consent given by: patient  Site marked: site marked  Timeout: Immediately prior to procedure a time out was called to verify the correct patient, procedure, equipment, support staff and site/side marked as required   Supporting Documentation  Indications: pain   Procedure  Details  Location: shoulder - R glenohumeral  Preparation: Patient was prepped and draped in the usual sterile fashion  Needle gauge: 21G.  Approach: anterior  Medications administered: 80 mg methylPREDNISolone acetate 80 MG/ML; 4 mL lidocaine (cardiac)  Patient tolerance: patient tolerated the procedure well with no immediate complications            Zane Moe MD    08/12/2022

## 2022-09-23 ENCOUNTER — OFFICE VISIT (OUTPATIENT)
Dept: FAMILY MEDICINE CLINIC | Age: 62
End: 2022-09-23

## 2022-09-23 ENCOUNTER — LAB (OUTPATIENT)
Dept: LAB | Facility: HOSPITAL | Age: 62
End: 2022-09-23

## 2022-09-23 VITALS
HEART RATE: 73 BPM | WEIGHT: 265 LBS | DIASTOLIC BLOOD PRESSURE: 85 MMHG | SYSTOLIC BLOOD PRESSURE: 126 MMHG | OXYGEN SATURATION: 97 % | HEIGHT: 75 IN | RESPIRATION RATE: 18 BRPM | BODY MASS INDEX: 32.95 KG/M2

## 2022-09-23 DIAGNOSIS — E11.65 TYPE 2 DIABETES MELLITUS WITH HYPERGLYCEMIA, WITHOUT LONG-TERM CURRENT USE OF INSULIN: ICD-10-CM

## 2022-09-23 DIAGNOSIS — Z00.00 ROUTINE ADULT HEALTH MAINTENANCE: ICD-10-CM

## 2022-09-23 DIAGNOSIS — K04.7 DENTAL ABSCESS: ICD-10-CM

## 2022-09-23 DIAGNOSIS — G62.9 PERIPHERAL POLYNEUROPATHY: ICD-10-CM

## 2022-09-23 DIAGNOSIS — E11.65 TYPE 2 DIABETES MELLITUS WITH HYPERGLYCEMIA, WITHOUT LONG-TERM CURRENT USE OF INSULIN: Primary | ICD-10-CM

## 2022-09-23 DIAGNOSIS — Z23 NEED FOR INFLUENZA VACCINATION: ICD-10-CM

## 2022-09-23 DIAGNOSIS — E78.5 HYPERLIPIDEMIA, UNSPECIFIED HYPERLIPIDEMIA TYPE: ICD-10-CM

## 2022-09-23 DIAGNOSIS — I10 PRIMARY HYPERTENSION: ICD-10-CM

## 2022-09-23 DIAGNOSIS — Z23 NEED FOR PNEUMOCOCCAL VACCINATION: ICD-10-CM

## 2022-09-23 PROBLEM — E66.01 MORBID (SEVERE) OBESITY DUE TO EXCESS CALORIES: Status: RESOLVED | Noted: 2022-03-21 | Resolved: 2022-09-23

## 2022-09-23 LAB
ALBUMIN SERPL-MCNC: 4.4 G/DL (ref 3.5–5.2)
ALBUMIN/GLOB SERPL: 1.8 G/DL
ALP SERPL-CCNC: 82 U/L (ref 39–117)
ALT SERPL W P-5'-P-CCNC: 10 U/L (ref 1–41)
ANION GAP SERPL CALCULATED.3IONS-SCNC: 8.1 MMOL/L (ref 5–15)
AST SERPL-CCNC: 12 U/L (ref 1–40)
BILIRUB SERPL-MCNC: 0.4 MG/DL (ref 0–1.2)
BUN SERPL-MCNC: 17 MG/DL (ref 8–23)
BUN/CREAT SERPL: 18.7 (ref 7–25)
CALCIUM SPEC-SCNC: 9.1 MG/DL (ref 8.6–10.5)
CHLORIDE SERPL-SCNC: 103 MMOL/L (ref 98–107)
CO2 SERPL-SCNC: 24.9 MMOL/L (ref 22–29)
CREAT SERPL-MCNC: 0.91 MG/DL (ref 0.76–1.27)
EGFRCR SERPLBLD CKD-EPI 2021: 95.9 ML/MIN/1.73
GLOBULIN UR ELPH-MCNC: 2.5 GM/DL
GLUCOSE SERPL-MCNC: 136 MG/DL (ref 65–99)
HBA1C MFR BLD: 7.2 % (ref 4.8–5.6)
POTASSIUM SERPL-SCNC: 4.5 MMOL/L (ref 3.5–5.2)
PROT SERPL-MCNC: 6.9 G/DL (ref 6–8.5)
SODIUM SERPL-SCNC: 136 MMOL/L (ref 136–145)

## 2022-09-23 PROCEDURE — 90472 IMMUNIZATION ADMIN EACH ADD: CPT | Performed by: NURSE PRACTITIONER

## 2022-09-23 PROCEDURE — 99214 OFFICE O/P EST MOD 30 MIN: CPT | Performed by: NURSE PRACTITIONER

## 2022-09-23 PROCEDURE — 36415 COLL VENOUS BLD VENIPUNCTURE: CPT

## 2022-09-23 PROCEDURE — 90677 PCV20 VACCINE IM: CPT | Performed by: NURSE PRACTITIONER

## 2022-09-23 PROCEDURE — 90471 IMMUNIZATION ADMIN: CPT | Performed by: NURSE PRACTITIONER

## 2022-09-23 PROCEDURE — 83036 HEMOGLOBIN GLYCOSYLATED A1C: CPT

## 2022-09-23 PROCEDURE — 90686 IIV4 VACC NO PRSV 0.5 ML IM: CPT | Performed by: NURSE PRACTITIONER

## 2022-09-23 PROCEDURE — 80053 COMPREHEN METABOLIC PANEL: CPT

## 2022-09-23 RX ORDER — AMOXICILLIN 500 MG/1
2000 CAPSULE ORAL ONCE
Qty: 4 CAPSULE | Refills: 0 | Status: CANCELLED | OUTPATIENT
Start: 2022-09-23 | End: 2022-09-23

## 2022-09-23 RX ORDER — MELOXICAM 15 MG/1
15 TABLET ORAL DAILY
Qty: 90 TABLET | Refills: 0 | Status: CANCELLED | OUTPATIENT
Start: 2022-09-23

## 2022-09-23 RX ORDER — GABAPENTIN 400 MG/1
400 CAPSULE ORAL 3 TIMES DAILY
Qty: 90 CAPSULE | Refills: 0 | Status: CANCELLED | OUTPATIENT
Start: 2022-09-23

## 2022-09-23 RX ORDER — GABAPENTIN 400 MG/1
1200 CAPSULE ORAL
Qty: 270 CAPSULE | Refills: 1 | Status: SHIPPED | OUTPATIENT
Start: 2022-09-23

## 2022-09-23 RX ORDER — AMOXICILLIN 500 MG/1
1000 CAPSULE ORAL 3 TIMES DAILY
Qty: 42 CAPSULE | Refills: 0 | Status: SHIPPED | OUTPATIENT
Start: 2022-09-23 | End: 2022-09-30

## 2022-09-23 NOTE — PROGRESS NOTES
"Collin Sandoval presents to [facility] with complaint of  Diabetes (Would like to discuss neuropathy), Follow-up, Hyperlipidemia, Hypertension, and Dental Pain (Will be needing to get tooth extracted, and would like to get antibiotic )    SUBJECTIVE  History of Present Illness     Patient is here for follow up of chronic medical conditions of DM, HLD, and HTN. Patient is on glipizide 20 mg qday, jardiance 25 mg qday, and metformin 1000 mg qday for his DM. His last A1c was 9.8 5 months ago and his glipizide was increased to 20 mg. He has been watching is food intake and trying to lose weight. Of note he has lost 12 lbs in the past 6 months. He says his BG range from 120-130s, never going above 150.  He is having increased neuropathy. He has been taking 2-3 capsule of Neurontin as 400 mg daily does not seem to help. He is wanting to have this medication increased. He finds that when he takes 400 mg 3 capsules at a time, this significantly helps his neuropathy pain. He has a dental abscess that he is scheduled to have fixed but he is requesting an antibiotic to hold him over until he can have this procedure done. He currently denies any fever or drainage from this abscess.     The following portions of the patient's history were reviewed and updated as appropriate: allergies, current medications, past family history, past medical history, past social history, past surgical history and problem list. He is okay with getting flu and pna vaccine today. He is UTD on covid vaccine.     OBJECTIVE  Vital Signs:   /85 (BP Location: Left arm, Patient Position: Sitting, Cuff Size: Large Adult)   Pulse 73   Resp 18   Ht 190.5 cm (75\")   Wt 120 kg (265 lb) Comment: patient reports, declined to weigh  SpO2 97% Comment: room air  BMI 33.12 kg/m²       Physical Exam  Constitutional:       General: He is not in acute distress.     Appearance: Normal appearance. He is obese. He is not " ill-appearing.   HENT:      Head: Normocephalic and atraumatic.      Nose: Nose normal.      Mouth/Throat:      Mouth: Mucous membranes are moist.   Cardiovascular:      Rate and Rhythm: Normal rate and regular rhythm.      Pulses: Normal pulses.      Heart sounds: Normal heart sounds.   Pulmonary:      Effort: Pulmonary effort is normal. No respiratory distress.      Breath sounds: Normal breath sounds.   Chest:      Chest wall: No tenderness.   Musculoskeletal:         General: Normal range of motion.      Cervical back: Normal range of motion and neck supple.   Skin:     General: Skin is warm and dry.      Capillary Refill: Capillary refill takes less than 2 seconds.   Neurological:      General: No focal deficit present.      Mental Status: He is alert and oriented to person, place, and time. Mental status is at baseline.   Psychiatric:         Mood and Affect: Mood normal.         Behavior: Behavior normal.          Results Review:  The following data was reviewed by BILL Baker [unfilled] 10:11 EDT.  MicroAlbumin, Urine, Random - Urine, Clean Catch (03/28/2022 07:42)  Hepatitis C antibody (03/28/2022 07:42)  Hemoglobin A1c (03/28/2022 07:42)  Comprehensive Metabolic Panel (03/28/2022 07:42)  CBC & Differential (03/28/2022 07:42)  PSA Screen (03/28/2022 07:42)  TSH (03/28/2022 07:42)  Lipid Panel (03/28/2022 07:42)      ASSESSMENT AND PLAN:  Diagnoses and all orders for this visit:    1. Type 2 diabetes mellitus with hyperglycemia, without long-term current use of insulin (HCC) (Primary)  Assessment & Plan:  -check A1c today to be managed according to findings  -UTD on eye and foot exam   -on Ace and statin  -discussed that if his A1c is not at goal we will need to add on another medication, discussed trulicity option   -cont metformin 1000 mg bid, glipizide 20 mg qday, jardiance 25 mg qday     Orders:  -     Comprehensive Metabolic Panel; Future  -     Hemoglobin A1c; Future    2. Routine adult health  maintenance    3. Peripheral polyneuropathy  Assessment & Plan:  -increased gabapentin to 1200 mg qday  -he may space dosing out as well  -UTD on ANKUR MATHEW reviewed     Orders:  -     gabapentin (NEURONTIN) 400 MG capsule; Take 3 capsules by mouth Daily With Breakfast.  Dispense: 270 capsule; Refill: 1    4. Need for influenza vaccination  -     Cancel: Fluzone Quad >6 mos (Multi-dose) (5540-5633)  -     FluLaval/Fluzone >6 mos (2855-7329)    5. Need for pneumococcal vaccination  -     Pneumococcal Conjugate Vaccine 20-Valent (PCV20)    6. Dental abscess  -     amoxicillin (AMOXIL) 500 MG capsule; Take 2 capsules by mouth 3 (Three) Times a Day for 7 days.  Dispense: 42 capsule; Refill: 0    7. Primary hypertension  Assessment & Plan:  Hypertension is unchanged.  Continue current treatment regimen.  Dietary sodium restriction.  Weight loss.  Regular aerobic exercise.  Continue current medications.  Ambulatory blood pressure monitoring.  Blood pressure will be reassessed 6 months .cont lisinopril 10 mg qday       8. Hyperlipidemia, unspecified hyperlipidemia type  Assessment & Plan:  Lipid abnormalities are unchanged.  Nutritional counseling was provided., Pharmacotherapy as ordered. and check lipids at follow up   Lipids will be reassessed in 6 months. Cont atorvastatin 20 mg qday for now.           Follow Up   Return in about 6 months (around 3/23/2023). Patient to notify office with any acute concerns or issues.  Patient verbalizes understanding, agrees with plan of care and has no further questions upon discharge.     Patient was given instructions and counseling regarding his condition or for health maintenance advice. Please see specific information pulled into the AVS if appropriate.     Discussed the importance of following up with any needed screening tests/labs/specialist appointments and any requested follow-up recommended by me today. Importance of maintaining follow-up discussed and patient accepts that  missed appointments can delay diagnosis and potentially lead to worsening of conditions.

## 2022-09-23 NOTE — ASSESSMENT & PLAN NOTE
-check A1c today to be managed according to findings  -UTD on eye and foot exam   -on Ace and statin  -discussed that if his A1c is not at goal we will need to add on another medication, discussed trulicity option   -cont metformin 1000 mg bid, glipizide 20 mg qday, jardiance 25 mg qday

## 2022-09-23 NOTE — ASSESSMENT & PLAN NOTE
Hypertension is unchanged.  Continue current treatment regimen.  Dietary sodium restriction.  Weight loss.  Regular aerobic exercise.  Continue current medications.  Ambulatory blood pressure monitoring.  Blood pressure will be reassessed 6 months .cont lisinopril 10 mg qday

## 2022-09-23 NOTE — ASSESSMENT & PLAN NOTE
Lipid abnormalities are unchanged.  Nutritional counseling was provided., Pharmacotherapy as ordered. and check lipids at follow up   Lipids will be reassessed in 6 months. Cont atorvastatin 20 mg qday for now.

## 2022-09-23 NOTE — ASSESSMENT & PLAN NOTE
-increased gabapentin to 1200 mg qday  -he may space dosing out as well  -UTD on UDS, ANKUR santos

## 2022-11-30 ENCOUNTER — TELEPHONE (OUTPATIENT)
Dept: ORTHOPEDIC SURGERY | Facility: CLINIC | Age: 62
End: 2022-11-30

## 2022-11-30 NOTE — TELEPHONE ENCOUNTER
Provider: JANAY AGUIRRE  Caller: RAY MANZANARES   Relationship to Patient: PATIENT     Phone Number: [phone]  Reason for Call: PATIENT READY TO GO FORWARD WITH RIGHT SHOULDER SX   When was the patient last seen: 08/12/22  PATIENT WOULD LIKE TO HAVE SX THE FIRST WEEK IN January IF AT ALL POSSIBLE

## 2022-12-01 ENCOUNTER — PREP FOR SURGERY (OUTPATIENT)
Dept: OTHER | Facility: HOSPITAL | Age: 62
End: 2022-12-01

## 2022-12-01 DIAGNOSIS — M19.019 ARTHRITIS OF SHOULDER: Primary | ICD-10-CM

## 2022-12-01 RX ORDER — CEFAZOLIN SODIUM IN 0.9 % NACL 3 G/100 ML
3 INTRAVENOUS SOLUTION, PIGGYBACK (ML) INTRAVENOUS ONCE
Status: CANCELLED | OUTPATIENT
Start: 2023-01-10 | End: 2022-12-01

## 2022-12-01 RX ORDER — PREGABALIN 75 MG/1
150 CAPSULE ORAL ONCE
Status: CANCELLED | OUTPATIENT
Start: 2023-01-10 | End: 2022-12-01

## 2022-12-01 RX ORDER — ACETAMINOPHEN 500 MG
1000 TABLET ORAL ONCE
Status: CANCELLED | OUTPATIENT
Start: 2023-01-10 | End: 2022-12-01

## 2022-12-01 RX ORDER — MELOXICAM 15 MG/1
15 TABLET ORAL ONCE
Status: CANCELLED | OUTPATIENT
Start: 2023-01-10 | End: 2022-12-01

## 2022-12-01 RX ORDER — VANCOMYCIN HYDROCHLORIDE 1 G/200ML
1000 INJECTION, SOLUTION INTRAVENOUS ONCE
Status: CANCELLED | OUTPATIENT
Start: 2023-01-10 | End: 2022-12-01

## 2022-12-02 PROBLEM — M19.019 ARTHRITIS OF SHOULDER: Status: ACTIVE | Noted: 2022-12-02

## 2022-12-21 ENCOUNTER — PRE-ADMISSION TESTING (OUTPATIENT)
Dept: PREADMISSION TESTING | Facility: HOSPITAL | Age: 62
End: 2022-12-21

## 2022-12-21 VITALS
SYSTOLIC BLOOD PRESSURE: 153 MMHG | TEMPERATURE: 97.1 F | WEIGHT: 285 LBS | HEART RATE: 82 BPM | DIASTOLIC BLOOD PRESSURE: 90 MMHG | HEIGHT: 75 IN | OXYGEN SATURATION: 98 % | BODY MASS INDEX: 35.43 KG/M2 | RESPIRATION RATE: 20 BRPM

## 2022-12-21 LAB
ANION GAP SERPL CALCULATED.3IONS-SCNC: 12 MMOL/L (ref 5–15)
BUN SERPL-MCNC: 16 MG/DL (ref 8–23)
BUN/CREAT SERPL: 18.6 (ref 7–25)
CALCIUM SPEC-SCNC: 9.5 MG/DL (ref 8.6–10.5)
CHLORIDE SERPL-SCNC: 105 MMOL/L (ref 98–107)
CO2 SERPL-SCNC: 23 MMOL/L (ref 22–29)
CREAT SERPL-MCNC: 0.86 MG/DL (ref 0.76–1.27)
DEPRECATED RDW RBC AUTO: 41.9 FL (ref 37–54)
EGFRCR SERPLBLD CKD-EPI 2021: 98.5 ML/MIN/1.73
ERYTHROCYTE [DISTWIDTH] IN BLOOD BY AUTOMATED COUNT: 12.6 % (ref 12.3–15.4)
GLUCOSE SERPL-MCNC: 208 MG/DL (ref 65–99)
HCT VFR BLD AUTO: 45.5 % (ref 37.5–51)
HGB BLD-MCNC: 15.2 G/DL (ref 13–17.7)
MCH RBC QN AUTO: 29.8 PG (ref 26.6–33)
MCHC RBC AUTO-ENTMCNC: 33.4 G/DL (ref 31.5–35.7)
MCV RBC AUTO: 89.2 FL (ref 79–97)
PLATELET # BLD AUTO: 190 10*3/MM3 (ref 140–450)
PMV BLD AUTO: 10.1 FL (ref 6–12)
POTASSIUM SERPL-SCNC: 4.1 MMOL/L (ref 3.5–5.2)
QT INTERVAL: 402 MS
RBC # BLD AUTO: 5.1 10*6/MM3 (ref 4.14–5.8)
SODIUM SERPL-SCNC: 140 MMOL/L (ref 136–145)
WBC NRBC COR # BLD: 7.73 10*3/MM3 (ref 3.4–10.8)

## 2022-12-21 PROCEDURE — 36415 COLL VENOUS BLD VENIPUNCTURE: CPT

## 2022-12-21 PROCEDURE — 85027 COMPLETE CBC AUTOMATED: CPT

## 2022-12-21 PROCEDURE — 93010 ELECTROCARDIOGRAM REPORT: CPT | Performed by: INTERNAL MEDICINE

## 2022-12-21 PROCEDURE — 80048 BASIC METABOLIC PNL TOTAL CA: CPT

## 2022-12-21 PROCEDURE — 93005 ELECTROCARDIOGRAM TRACING: CPT

## 2022-12-21 NOTE — DISCHARGE INSTRUCTIONS
Take the following medications the morning of surgery:    INHALER, XANAX, CITALOPRAM, GABAPENTIN    OFFICE TO CALL WITH ARRIVAL TIME FOR SURGERY ON 1/10/2022    If you are on prescription narcotic pain medication to control your pain you may also take that medication the morning of surgery.    General Instructions:  Do not eat solid food after midnight the night before surgery.  You may drink clear liquids day of surgery but must stop at least one hour before your hospital arrival time.  It is beneficial for you to have a clear drink that contains carbohydrates the day of surgery.  We suggest a 12 to 20 ounce bottle of Gatorade or Powerade for non-diabetic patients or a 12 to 20 ounce bottle of G2 or Powerade Zero for diabetic patients. (Pediatric patients, are not advised to drink a 12 to 20 ounce carbohydrate drink)    Clear liquids are liquids you can see through.  Nothing red in color.     Plain water                               Sports drinks  Sodas                                   Gelatin (Jell-O)  Fruit juices without pulp such as white grape juice and apple juice  Popsicles that contain no fruit or yogurt  Tea or coffee (no cream or milk added)  Gatorade / Powerade  G2 / Powerade Zero    Infants may have breast milk up to four hours before surgery.  Infants drinking formula may drink formula up to six hours before surgery.   Patients who avoid smoking, chewing tobacco and alcohol for 4 weeks prior to surgery have a reduced risk of post-operative complications.  Quit smoking as many days before surgery as you can.  Do not smoke, use chewing tobacco or drink alcohol the day of surgery.   If applicable bring your C-PAP/ BI-PAP machine.  Bring any papers given to you in the doctor’s office.  Wear clean comfortable clothes.  Do not wear contact lenses, false eyelashes or make-up.  Bring a case for your glasses.   Bring crutches or walker if applicable.  Remove all piercings.  Leave jewelry and any other  valuables at home.  Hair extensions with metal clips must be removed prior to surgery.  The Pre-Admission Testing nurse will instruct you to bring medications if unable to obtain an accurate list in Pre-Admission Testing.          Preventing a Surgical Site Infection:  For 2 to 3 days before surgery, avoid shaving with a razor because the razor can irritate skin and make it easier to develop an infection.    Any areas of open skin can increase the risk of a post-operative wound infection by allowing bacteria to enter and travel throughout the body.  Notify your surgeon if you have any skin wounds / rashes even if it is not near the expected surgical site.  The area will need assessed to determine if surgery should be delayed until it is healed.  The night prior to surgery shower using a fresh bar of anti-bacterial soap (such as Dial) and clean washcloth.  Sleep in a clean bed with clean clothing.  Do not allow pets to sleep with you.  Shower on the morning of surgery using a fresh bar of anti-bacterial soap (such as Dial) and clean washcloth.  Dry with a clean towel and dress in clean clothing.  Ask your surgeon if you will be receiving antibiotics prior to surgery.  Make sure you, your family, and all healthcare providers clean their hands with soap and water or an alcohol based hand  before caring for you or your wound.    Day of surgery:  Your arrival time is approximately two hours before your scheduled surgery time.  Upon arrival, a Pre-op nurse and Anesthesiologist will review your health history, obtain vital signs, and answer questions you may have.  The only belongings needed at this time will be a list of your home medications and if applicable your C-PAP/BI-PAP machine.  A Pre-op nurse will start an IV and you may receive medication in preparation for surgery, including something to help you relax.     Please be aware that surgery does come with discomfort.  We want to make every effort to  control your discomfort so please discuss any uncontrolled symptoms with your nurse.   Your doctor will most likely have prescribed pain medications.      If you are going home after surgery you will receive individualized written care instructions before being discharged.  A responsible adult must drive you to and from the hospital on the day of your surgery and stay with you for 24 hours.  Discharge prescriptions can be filled by the hospital pharmacy during regular pharmacy hours.  If you are having surgery late in the day/evening your prescription may be e-prescribed to your pharmacy.  Please verify your pharmacy hours or chose a 24 hour pharmacy to avoid not having access to your prescription because your pharmacy has closed for the day.    If you are staying overnight following surgery, you will be transported to your hospital room following the recovery period.  [facility] has all private rooms.    If you have any questions please call Pre-Admission Testing at [phone].  Deductibles and co-payments are collected on the day of service. Please be prepared to pay the required co-pay, deductible or deposit on the day of service as defined by your plan.    Call your surgeon immediately if you experience any of the following symptoms:  Sore Throat  Shortness of Breath or difficulty breathing  Cough  Chills  Body soreness or muscle pain  Headache  Fever  New loss of taste or smell  Do not arrive for your surgery ill.  Your procedure will need to be rescheduled to another time.  You will need to call your physician before the day of surgery to avoid any unnecessary exposure to hospital staff as well as other patients.   CHLORHEXIDINE CLOTH INSTRUCTIONS  The morning of surgery follow these instructions using the Chlorhexidine cloths you've been given.  These steps reduce bacteria on the body.  Do not use the cloths near your eyes, ears mouth, genitalia or on open wounds.  Throw the cloths away  after use but do not try to flush them down a toilet.      Open and remove one cloth at a time from the package.    Leave the cloth unfolded and begin the bathing.  Massage the skin with the cloths using gentle pressure to remove bacteria.  Do not scrub harshly.   Follow the steps below with one 2% CHG cloth per area (6 total cloths).  One cloth for neck, shoulders and chest.  One cloth for both arms, hands, fingers and underarms (do underarms last).  One cloth for the abdomen followed by groin.  One cloth for right leg and foot including between the toes.  One cloth for left leg and foot including between the toes.  The last cloth is to be used for the back of the neck, back and buttocks.    Allow the CHG to air dry 3 minutes on the skin which will give it time to work and decrease the chance of irritation.  The skin may feel sticky until it is dry.  Do not rinse with water or any other liquid or you will lose the beneficial effects of the CHG.  If mild skin irritation occurs, do rinse the skin to remove the CHG.  Report this to the nurse at time of admission.  Do not apply lotions, creams, ointments, deodorants or perfumes after using the clothes. Dress in clean clothes before coming to the hospital.

## 2022-12-27 ENCOUNTER — OFFICE VISIT (OUTPATIENT)
Dept: FAMILY MEDICINE CLINIC | Age: 62
End: 2022-12-27

## 2022-12-27 VITALS
BODY MASS INDEX: 35.62 KG/M2 | TEMPERATURE: 97.8 F | HEART RATE: 67 BPM | SYSTOLIC BLOOD PRESSURE: 130 MMHG | DIASTOLIC BLOOD PRESSURE: 80 MMHG | OXYGEN SATURATION: 96 % | HEIGHT: 75 IN

## 2022-12-27 DIAGNOSIS — G62.9 PERIPHERAL POLYNEUROPATHY: ICD-10-CM

## 2022-12-27 DIAGNOSIS — R05.9 COUGH, UNSPECIFIED TYPE: Primary | ICD-10-CM

## 2022-12-27 DIAGNOSIS — E78.5 HYPERLIPIDEMIA, UNSPECIFIED HYPERLIPIDEMIA TYPE: ICD-10-CM

## 2022-12-27 DIAGNOSIS — Z12.5 SCREENING FOR MALIGNANT NEOPLASM OF PROSTATE: ICD-10-CM

## 2022-12-27 DIAGNOSIS — I10 PRIMARY HYPERTENSION: ICD-10-CM

## 2022-12-27 DIAGNOSIS — J40 BRONCHITIS: ICD-10-CM

## 2022-12-27 DIAGNOSIS — E11.65 TYPE 2 DIABETES MELLITUS WITH HYPERGLYCEMIA, WITHOUT LONG-TERM CURRENT USE OF INSULIN: ICD-10-CM

## 2022-12-27 LAB
EXPIRATION DATE: NORMAL
FLUAV AG UPPER RESP QL IA.RAPID: NOT DETECTED
FLUBV AG UPPER RESP QL IA.RAPID: NOT DETECTED
INTERNAL CONTROL: NORMAL
Lab: NORMAL
SARS-COV-2 AG UPPER RESP QL IA.RAPID: NOT DETECTED

## 2022-12-27 PROCEDURE — 99214 OFFICE O/P EST MOD 30 MIN: CPT | Performed by: NURSE PRACTITIONER

## 2022-12-27 PROCEDURE — 87428 SARSCOV & INF VIR A&B AG IA: CPT | Performed by: NURSE PRACTITIONER

## 2022-12-27 RX ORDER — AZITHROMYCIN 250 MG/1
TABLET, FILM COATED ORAL
Qty: 6 TABLET | Refills: 0 | Status: ON HOLD | OUTPATIENT
Start: 2022-12-27 | End: 2023-01-10

## 2022-12-27 RX ORDER — METHYLPREDNISOLONE 4 MG/1
1 TABLET ORAL DAILY
Qty: 21 TABLET | Refills: 0 | Status: ON HOLD | OUTPATIENT
Start: 2022-12-27 | End: 2023-01-10

## 2022-12-27 RX ORDER — ALBUTEROL SULFATE 90 UG/1
AEROSOL, METERED RESPIRATORY (INHALATION)
Qty: 18 G | Refills: 2 | Status: SHIPPED | OUTPATIENT
Start: 2022-12-27

## 2022-12-27 NOTE — ASSESSMENT & PLAN NOTE
Hypertension is unchanged.  Continue current treatment regimen.  Blood pressure will be reassessed at the next regular appointment.  Continue lisinopril 10 mg once per day

## 2022-12-27 NOTE — PROGRESS NOTES
"Collin Sandoval presents to [facility] with complaint of  URI (Cough, congestion, wheezing, sob x 2 days ago )    SUBJECTIVE  URI   This is a new problem. Episode onset: 2 days ago. The problem has been gradually improving. There has been no fever. Associated symptoms include congestion, rhinorrhea, a sore throat and wheezing. Pertinent negatives include no abdominal pain, chest pain, coughing, diarrhea, dysuria, ear pain, headaches, plugged ear sensation, sinus pain or sneezing. He has tried acetaminophen for the symptoms.      He is scheduled to have a right shoulder replacement in January.  A1c 3 months ago was 7.2.  He is compliant with Jardiance 25 mg once per day, glipizide 20 mg once per day, metformin 1000 mg per day.  For his hypertension he is on lisinopril 10 mg once per day.  He reports that his numbness in his feet has not improved with the increase in gabapentin.  He has been taking 400 mg 3 times per day.     OBJECTIVE  Vital Signs:   /80 Comment: manual recheck  Pulse 67   Temp 97.8 °F (36.6 °C) (Oral)   Ht 190.5 cm (75\")   SpO2 96%   BMI 35.62 kg/m²       Physical Exam  Constitutional:       General: He is not in acute distress.     Appearance: Normal appearance. He is not ill-appearing.   HENT:      Head: Normocephalic and atraumatic.      Nose: Nose normal.      Mouth/Throat:      Pharynx: Oropharynx is clear.   Cardiovascular:      Rate and Rhythm: Normal rate and regular rhythm.      Pulses: Normal pulses.      Heart sounds: Normal heart sounds.   Pulmonary:      Effort: Pulmonary effort is normal. No respiratory distress.      Breath sounds: Examination of the right-upper field reveals wheezing. Examination of the left-upper field reveals wheezing. Examination of the right-middle field reveals wheezing. Examination of the left-middle field reveals wheezing. Examination of the right-lower field reveals wheezing. Examination of the left-lower field reveals " wheezing. Wheezing present.   Chest:      Chest wall: No tenderness.   Musculoskeletal:         General: Normal range of motion.      Cervical back: Normal range of motion and neck supple.   Skin:     General: Skin is warm and dry.   Neurological:      General: No focal deficit present.      Mental Status: He is alert and oriented to person, place, and time. Mental status is at baseline.   Psychiatric:         Mood and Affect: Mood normal.         Behavior: Behavior normal.          Results Review:  The following data was reviewed by BILL Baker [unfilled] 13:53 EST.    Office Visit on 12/27/2022   Component Date Value Ref Range Status   • SARS Antigen 12/27/2022 Not Detected  Not Detected, Presumptive Negative Final   • Influenza A Antigen GAIL 12/27/2022 Not Detected  Not Detected Final   • Influenza B Antigen GAIL 12/27/2022 Not Detected  Not Detected Final   • Internal Control 12/27/2022 Passed  Passed Final   • Lot Number 12/27/2022 [id number]   Final   • Expiration Date 12/27/2022 11/9/23   Final         ASSESSMENT AND PLAN:  Diagnoses and all orders for this visit:    1. Cough, unspecified type (Primary)  -     POCT SARS-CoV-2 Antigen GAIL + Flu    2. Bronchitis  Comments:  Negative for flu and COVID today, albuterol as needed, treatment as below  Orders:  -     azithromycin (Zithromax Z-Isaac) 250 MG tablet; Take 2 tablets by mouth on day 1, then 1 tablet daily on days 2-5  Dispense: 6 tablet; Refill: 0  -     methylPREDNISolone (MEDROL) 4 MG dose pack; Take 1 tablet by mouth Daily. Take as directed on package instructions.  Dispense: 21 tablet; Refill: 0    3. Type 2 diabetes mellitus with hyperglycemia, without long-term current use of insulin ([facility])  Assessment & Plan:  Diabetes is improving with treatment.   Continue current treatment regimen.  Diabetes will be reassessed in 3 months.    Orders:  -     Hemoglobin A1c; Future  -     MicroAlbumin, Urine, Random - Urine, Clean Catch; Future    4. Hyperlipidemia,  unspecified hyperlipidemia type  Assessment & Plan:  - On atorvastatin 20 mg once per day  -Lipid panel in March    Orders:  -     Lipid Panel; Future    5. Primary hypertension  Assessment & Plan:  Hypertension is unchanged.  Continue current treatment regimen.  Blood pressure will be reassessed at the next regular appointment.  Continue lisinopril 10 mg once per day    Orders:  -     Comprehensive Metabolic Panel; Future    6. Screening for malignant neoplasm of prostate  -     PSA Screen; Future    7. Peripheral polyneuropathy  Assessment & Plan:  -increase gabapentin to 800 mg bid (using current supply)          Follow Up   Return if symptoms worsen or fail to improve. Patient to notify office with any acute concerns or issues.  Patient verbalizes understanding, agrees with plan of care and has no further questions upon discharge.     Patient was given instructions and counseling regarding his condition or for health maintenance advice. Please see specific information pulled into the AVS if appropriate.     Discussed the importance of following up with any needed screening tests/labs/specialist appointments and any requested follow-up recommended by me today. Importance of maintaining follow-up discussed and patient accepts that missed appointments can delay diagnosis and potentially lead to worsening of conditions.

## 2023-01-04 ENCOUNTER — TELEPHONE (OUTPATIENT)
Dept: ORTHOPEDIC SURGERY | Facility: HOSPITAL | Age: 63
End: 2023-01-04
Payer: COMMERCIAL

## 2023-01-04 NOTE — TELEPHONE ENCOUNTER
Risk Factor yes no   Age >75  X   BMI <20 >40  X   Patient History     Chronic Pain (2 or more meds/Pain Management)  X   ETOH (more than 3 drinks Daily)  X   Uncontrolled Depression/Bipolar/Schizoaffective Disorder  X   Arrhythmias  X   Stent placement/MI  X   DVT/PE  X   Pacemaker  X   HTN (uncontrolled or requiring more than 2 medications)  X   CHF/Retained fluids/Edema  X   Stroke with Residual   X   COPD/Asthma X    DOMINIQUE--Non-compliant with CPAP  X   Diabetes (on insulin or more than 2 meds)         A1C:7.2 (9/22) X    BPH/Urinary retention (on medication)  X   CKD  X   Home environment and support     Current ambulation status (use of cane, walker, W/C, Multiple falls/weakness)  X   Stairs to enter and throughout home X    Lives Alone  X   Doesn’t have support at home  X     Outpatient Screening Assessment    Home needs: (Walker/BSC):  Total Shoulder   ? Steps 2 Steps   Caregiver 24-48hrs post-discharge: wife    Discharge Plan:  Total Shoulder     Prescriptions: Meds to bed    Home medications:   ? Blood thinner/anti-coag therapy--ASA   ? BPH or diuretic   ? BP meds--Lisinopril,   ? Pain/Anti-inflammatories--Gabapentin,   Pre-op Education:  Educate patient on spinal anesthesia/pain control:  ? patient verbalize understanding    Educate patient on hospital course/timeline:  ?  patient verbalize understanding    Joint Care Class:  ?  yes ? no    Notes:   DM--Glipizide, Metformin, Jardiance  Accu Check 208  Asthma--Albuterol Neb and Inhaler told to bring inhaler with him

## 2023-01-06 ENCOUNTER — OFFICE VISIT (OUTPATIENT)
Dept: ORTHOPEDIC SURGERY | Facility: CLINIC | Age: 63
End: 2023-01-06
Payer: COMMERCIAL

## 2023-01-06 VITALS — BODY MASS INDEX: 35.43 KG/M2 | WEIGHT: 285 LBS | HEIGHT: 75 IN | RESPIRATION RATE: 12 BRPM | TEMPERATURE: 98.6 F

## 2023-01-06 DIAGNOSIS — Z01.818 PRE-OP EVALUATION: ICD-10-CM

## 2023-01-06 DIAGNOSIS — M19.019 ARTHRITIS OF SHOULDER: Primary | ICD-10-CM

## 2023-01-06 PROCEDURE — S0260 H&P FOR SURGERY: HCPCS | Performed by: ORTHOPAEDIC SURGERY

## 2023-01-06 PROCEDURE — 73030 X-RAY EXAM OF SHOULDER: CPT | Performed by: ORTHOPAEDIC SURGERY

## 2023-01-06 NOTE — H&P (VIEW-ONLY)
History & Physical       Patient: Collin Sandoval    YOB: 1960    Medical Record Number: [id number]    Chief Complaints:   Chief Complaint   Patient presents with   • Right Shoulder - Follow-up       History of Present Illness: 62 y.o. male who comes in today in anticipation of upcoming total shoulder replacement surgery. Reports a long history of progressively worsening pain, motion loss, and dysfunction.  Describes current pain as severe.  Has failed prolonged conservative treatment.  Denies any new complaints or issues.    Allergies: No Known Allergies    Home Medications:    Current Outpatient Medications:   •  albuterol (PROVENTIL) (2.5 MG/3ML) 0.083% nebulizer solution, Take 2.5 mg by nebulization Every 4 (Four) Hours As Needed for Wheezing., Disp: 30 mL, Rfl: 12  •  albuterol sulfate  (90 Base) MCG/ACT inhaler, USE 2 PUFFS EVERY 4 HOURS AS NEEDED FOR WHEEZING, Disp: 18 g, Rfl: 2  •  ALPRAZolam (XANAX) 1 MG tablet, Take 1 mg by mouth 3 (Three) Times a Day As Needed., Disp: , Rfl:   •  atorvastatin (LIPITOR) 20 MG tablet, Take 1 tablet by mouth Daily., Disp: 90 tablet, Rfl: 1  •  citalopram (CeleXA) 20 MG tablet, Take 1 tablet by mouth Daily., Disp: 90 tablet, Rfl: 1  •  empagliflozin (JARDIANCE) 25 MG tablet tablet, Take 1 tablet by mouth Daily., Disp: 90 tablet, Rfl: 1  •  gabapentin (NEURONTIN) 400 MG capsule, Take 3 capsules by mouth Daily With Breakfast., Disp: 270 capsule, Rfl: 1  •  glipizide (Glucotrol XL) 10 MG 24 hr tablet, Take 2 tablets by mouth Daily. Correct sig: Take 2 tabs PO once per day (Patient taking differently: Take 20 mg by mouth 2 (Two) Times a Day.), Disp: 180 tablet, Rfl: 1  •  lisinopril (PRINIVIL,ZESTRIL) 10 MG tablet, Take 1 tablet by mouth Daily., Disp: 90 tablet, Rfl: 1  •  metFORMIN (GLUCOPHAGE) 1000 MG tablet, TAKE 1 TABLET BY MOUTH TWICE DAILY, Disp: 180 tablet, Rfl: 1  •  methylPREDNISolone (MEDROL) 4 MG dose pack, Take 1 tablet by mouth Daily. Take as  "directed on package instructions., Disp: 21 tablet, Rfl: 0  •  azithromycin (Zithromax Z-Isaac) 250 MG tablet, Take 2 tablets by mouth on day 1, then 1 tablet daily on days 2-5, Disp: 6 tablet, Rfl: 0    Past Medical History:   Diagnosis Date   • Arthritis    • Arthritis of shoulder 12/02/2022   • Asthma    • Colon polyps    • Diabetes (HCC)    • Morbid (severe) obesity due to excess calories (HCC) 03/21/2022          Past Surgical History:   Procedure Laterality Date   • COLONOSCOPY     • JOINT REPLACEMENT  Right Shoulder          Social History     Occupational History   • Not on file   Tobacco Use   • Smoking status: Never   • Smokeless tobacco: Never   Vaping Use   • Vaping Use: Never used   Substance and Sexual Activity   • Alcohol use: Yes     Alcohol/week: 5.0 standard drinks     Types: 5 Cans of beer per week     Comment: 2 drinks twice per week   • Drug use: Never   • Sexual activity: Yes     Partners: Female      Social History     Social History Narrative   • Not on file          Family History   Problem Relation Age of Onset   • Diabetes Sister    • Malig Hyperthermia Neg Hx        Review of Systems:     Constitutional:  Denies fever, shaking or chills   Eyes:  Denies change in visual acuity   HEENT:  Denies nasal congestion or sore throat   Respiratory:  Denies cough or shortness of breath   Cardiovascular:  Denies chest pain or edema   GI:  Denies abdominal pain, nausea, vomiting, bloody stools or diarrhea   Musculoskeletal:  Denies numbness tingling or loss of motor function except as outlined above in history of present illness.  Integument:  Denies rash, lesion or ulceration   Neurologic:  Denies headache or focal weakness, deficits      All other pertinent positives and negatives as noted above in HPI.    Physical Exam: 62 y.o. male    Vitals:    01/06/23 1127   Resp: 12   Temp: 98.6 °F (37 °C)   Weight: 129 kg (285 lb)   Height: 190.5 cm (75\")     General:  Patient is awake and alert.  Appears in no " acute distress or discomfort.    Psych:  Affect and demeanor are appropriate.    Eyes:  Conjunctiva and sclera appear grossly normal.  Eyes track well and EOM seem to be intact.    Ears:  No gross abnormalities.  Hearing adequate for the exam.    Cardiovascular:  Regular rate and rhythm.    Lungs:  Good chest expansion.  Breathing unlabored.    Right upper extremity:  Skin benign and intact without evidence for swelling, masses or atrophy.  No palpable masses.  No focal areas of exquisite tenderness.  Shoulder ROM limited and uncomfortable--, 35 ER, back pocket IR.  No evident instability or apprehension.  Good strength in rotator cuff, deltoid, wrist and hand.  Intact sensation in arm, hand.  Palpable radial pulse with brisk cap refill.    Diagnostic Tests:  Lab Results   Component Value Date    GLUCOSE 208 (H) 12/21/2022    CALCIUM 9.5 12/21/2022     12/21/2022    K 4.1 12/21/2022    CO2 23.0 12/21/2022     12/21/2022    BUN 16 12/21/2022    CREATININE 0.86 12/21/2022    BCR 18.6 12/21/2022    ANIONGAP 12.0 12/21/2022     Lab Results   Component Value Date    WBC 7.73 12/21/2022    HGB 15.2 12/21/2022    HCT 45.5 12/21/2022    MCV 89.2 12/21/2022     12/21/2022     No results found for: INR, PROTIME    Imaging:   AP, scapular Y, and axillary views of the right shoulder are ordered by myself and reviewed.  These are compared to previous xrays.  The x-rays show severe glenohumeral osteoarthritis with bone on bone degeneration, subluxation of the humeral head, osteophyte formation, and subchondral sclerosis.  The acromiohumeral interval measures normal.    Assessment:  Right shoulder osteoarthritis    Plan: We had a thorough discussion regarding the risks, benefits and alternatives to an arthroplasty and non-surgical management versus surgery.  I explained that surgical risks include infection, hematoma, hardware related complications including failure of fixation, loosening, fracture,  subscapularis repair failure and/or rotator cuff tear necessitating revision surgery, persistent pain and/or loss of motion, iatrogenic nerve and/or blood vessel injury resulting in permanent weakness, numbness or dysfunction, DVT, PE, positioning related neuropraxia, and anesthesia related complications resulting in death.  I did explain the possible need for reverse arthroplasty depending upon the degree of retroversion and the status of the rotator cuff at the time of surgery.  I further explained the risks inherent to reverse arthroplasty as compared to a standard total shoulder.  Specifically, we discussed the risks of notching, glenoid loosening, instability, and traction related neuropraxia, any of which could result in persistent pain or problems requiring further surgery.   Lastly, we discussed the rehab and all that will be expected of him post operatively to ensure an optimal outcome.  He acknowledged understanding and consents to proceed.    Date: 1/6/2023    Zane Moe MD

## 2023-01-06 NOTE — PROGRESS NOTES
History & Physical       Patient: Collin Sandoavl    YOB: 1960    Medical Record Number: [id number]    Chief Complaints:   Chief Complaint   Patient presents with   • Right Shoulder - Follow-up       History of Present Illness: 62 y.o. male who comes in today in anticipation of upcoming total shoulder replacement surgery. Reports a long history of progressively worsening pain, motion loss, and dysfunction.  Describes current pain as severe.  Has failed prolonged conservative treatment.  Denies any new complaints or issues.    Allergies: No Known Allergies    Home Medications:    Current Outpatient Medications:   •  albuterol (PROVENTIL) (2.5 MG/3ML) 0.083% nebulizer solution, Take 2.5 mg by nebulization Every 4 (Four) Hours As Needed for Wheezing., Disp: 30 mL, Rfl: 12  •  albuterol sulfate  (90 Base) MCG/ACT inhaler, USE 2 PUFFS EVERY 4 HOURS AS NEEDED FOR WHEEZING, Disp: 18 g, Rfl: 2  •  ALPRAZolam (XANAX) 1 MG tablet, Take 1 mg by mouth 3 (Three) Times a Day As Needed., Disp: , Rfl:   •  atorvastatin (LIPITOR) 20 MG tablet, Take 1 tablet by mouth Daily., Disp: 90 tablet, Rfl: 1  •  citalopram (CeleXA) 20 MG tablet, Take 1 tablet by mouth Daily., Disp: 90 tablet, Rfl: 1  •  empagliflozin (JARDIANCE) 25 MG tablet tablet, Take 1 tablet by mouth Daily., Disp: 90 tablet, Rfl: 1  •  gabapentin (NEURONTIN) 400 MG capsule, Take 3 capsules by mouth Daily With Breakfast., Disp: 270 capsule, Rfl: 1  •  glipizide (Glucotrol XL) 10 MG 24 hr tablet, Take 2 tablets by mouth Daily. Correct sig: Take 2 tabs PO once per day (Patient taking differently: Take 20 mg by mouth 2 (Two) Times a Day.), Disp: 180 tablet, Rfl: 1  •  lisinopril (PRINIVIL,ZESTRIL) 10 MG tablet, Take 1 tablet by mouth Daily., Disp: 90 tablet, Rfl: 1  •  metFORMIN (GLUCOPHAGE) 1000 MG tablet, TAKE 1 TABLET BY MOUTH TWICE DAILY, Disp: 180 tablet, Rfl: 1  •  methylPREDNISolone (MEDROL) 4 MG dose pack, Take 1 tablet by mouth Daily. Take as  directed on package instructions., Disp: 21 tablet, Rfl: 0  •  azithromycin (Zithromax Z-Isaac) 250 MG tablet, Take 2 tablets by mouth on day 1, then 1 tablet daily on days 2-5, Disp: 6 tablet, Rfl: 0    Past Medical History:   Diagnosis Date   • Arthritis    • Arthritis of shoulder 12/02/2022   • Asthma    • Colon polyps    • Diabetes (HCC)    • Morbid (severe) obesity due to excess calories (HCC) 03/21/2022          Past Surgical History:   Procedure Laterality Date   • COLONOSCOPY     • JOINT REPLACEMENT  Right Shoulder          Social History     Occupational History   • Not on file   Tobacco Use   • Smoking status: Never   • Smokeless tobacco: Never   Vaping Use   • Vaping Use: Never used   Substance and Sexual Activity   • Alcohol use: Yes     Alcohol/week: 5.0 standard drinks     Types: 5 Cans of beer per week     Comment: 2 drinks twice per week   • Drug use: Never   • Sexual activity: Yes     Partners: Female      Social History     Social History Narrative   • Not on file          Family History   Problem Relation Age of Onset   • Diabetes Sister    • Malig Hyperthermia Neg Hx        Review of Systems:     Constitutional:  Denies fever, shaking or chills   Eyes:  Denies change in visual acuity   HEENT:  Denies nasal congestion or sore throat   Respiratory:  Denies cough or shortness of breath   Cardiovascular:  Denies chest pain or edema   GI:  Denies abdominal pain, nausea, vomiting, bloody stools or diarrhea   Musculoskeletal:  Denies numbness tingling or loss of motor function except as outlined above in history of present illness.  Integument:  Denies rash, lesion or ulceration   Neurologic:  Denies headache or focal weakness, deficits      All other pertinent positives and negatives as noted above in HPI.    Physical Exam: 62 y.o. male    Vitals:    01/06/23 1127   Resp: 12   Temp: 98.6 °F (37 °C)   Weight: 129 kg (285 lb)   Height: 190.5 cm (75\")     General:  Patient is awake and alert.  Appears in no  acute distress or discomfort.    Psych:  Affect and demeanor are appropriate.    Eyes:  Conjunctiva and sclera appear grossly normal.  Eyes track well and EOM seem to be intact.    Ears:  No gross abnormalities.  Hearing adequate for the exam.    Cardiovascular:  Regular rate and rhythm.    Lungs:  Good chest expansion.  Breathing unlabored.    Right upper extremity:  Skin benign and intact without evidence for swelling, masses or atrophy.  No palpable masses.  No focal areas of exquisite tenderness.  Shoulder ROM limited and uncomfortable--, 35 ER, back pocket IR.  No evident instability or apprehension.  Good strength in rotator cuff, deltoid, wrist and hand.  Intact sensation in arm, hand.  Palpable radial pulse with brisk cap refill.    Diagnostic Tests:  Lab Results   Component Value Date    GLUCOSE 208 (H) 12/21/2022    CALCIUM 9.5 12/21/2022     12/21/2022    K 4.1 12/21/2022    CO2 23.0 12/21/2022     12/21/2022    BUN 16 12/21/2022    CREATININE 0.86 12/21/2022    BCR 18.6 12/21/2022    ANIONGAP 12.0 12/21/2022     Lab Results   Component Value Date    WBC 7.73 12/21/2022    HGB 15.2 12/21/2022    HCT 45.5 12/21/2022    MCV 89.2 12/21/2022     12/21/2022     No results found for: INR, PROTIME    Imaging:   AP, scapular Y, and axillary views of the right shoulder are ordered by myself and reviewed.  These are compared to previous xrays.  The x-rays show severe glenohumeral osteoarthritis with bone on bone degeneration, subluxation of the humeral head, osteophyte formation, and subchondral sclerosis.  The acromiohumeral interval measures normal.    Assessment:  Right shoulder osteoarthritis    Plan: We had a thorough discussion regarding the risks, benefits and alternatives to an arthroplasty and non-surgical management versus surgery.  I explained that surgical risks include infection, hematoma, hardware related complications including failure of fixation, loosening, fracture,  subscapularis repair failure and/or rotator cuff tear necessitating revision surgery, persistent pain and/or loss of motion, iatrogenic nerve and/or blood vessel injury resulting in permanent weakness, numbness or dysfunction, DVT, PE, positioning related neuropraxia, and anesthesia related complications resulting in death.  I did explain the possible need for reverse arthroplasty depending upon the degree of retroversion and the status of the rotator cuff at the time of surgery.  I further explained the risks inherent to reverse arthroplasty as compared to a standard total shoulder.  Specifically, we discussed the risks of notching, glenoid loosening, instability, and traction related neuropraxia, any of which could result in persistent pain or problems requiring further surgery.   Lastly, we discussed the rehab and all that will be expected of him post operatively to ensure an optimal outcome.  He acknowledged understanding and consents to proceed.    Date: 1/6/2023    Zane Moe MD

## 2023-01-10 ENCOUNTER — HOSPITAL ENCOUNTER (OUTPATIENT)
Facility: HOSPITAL | Age: 63
Setting detail: HOSPITAL OUTPATIENT SURGERY
Discharge: HOME | End: 2023-01-10
Attending: ORTHOPAEDIC SURGERY | Admitting: ORTHOPAEDIC SURGERY
Payer: COMMERCIAL

## 2023-01-10 ENCOUNTER — APPOINTMENT (OUTPATIENT)
Dept: GENERAL RADIOLOGY | Facility: HOSPITAL | Age: 63
End: 2023-01-10
Payer: COMMERCIAL

## 2023-01-10 ENCOUNTER — ANESTHESIA EVENT (OUTPATIENT)
Dept: PERIOP | Facility: HOSPITAL | Age: 63
End: 2023-01-10
Payer: COMMERCIAL

## 2023-01-10 ENCOUNTER — ANESTHESIA (OUTPATIENT)
Dept: PERIOP | Facility: HOSPITAL | Age: 63
End: 2023-01-10
Payer: COMMERCIAL

## 2023-01-10 VITALS
TEMPERATURE: 97.7 F | DIASTOLIC BLOOD PRESSURE: 79 MMHG | RESPIRATION RATE: 16 BRPM | OXYGEN SATURATION: 94 % | SYSTOLIC BLOOD PRESSURE: 137 MMHG | HEART RATE: 94 BPM

## 2023-01-10 DIAGNOSIS — M19.019 ARTHRITIS OF SHOULDER: ICD-10-CM

## 2023-01-10 DIAGNOSIS — Z96.611 H/O TOTAL SHOULDER REPLACEMENT, RIGHT: Primary | ICD-10-CM

## 2023-01-10 LAB
GLUCOSE BLDC GLUCOMTR-MCNC: 182 MG/DL (ref 70–130)
GLUCOSE BLDC GLUCOMTR-MCNC: 203 MG/DL (ref 70–130)
GLUCOSE BLDC GLUCOMTR-MCNC: 264 MG/DL (ref 70–130)

## 2023-01-10 PROCEDURE — 25010000002 DEXAMETHASONE SODIUM PHOSPHATE 20 MG/5ML SOLUTION: Performed by: NURSE ANESTHETIST, CERTIFIED REGISTERED

## 2023-01-10 PROCEDURE — 73020 X-RAY EXAM OF SHOULDER: CPT

## 2023-01-10 PROCEDURE — 0 BUPIVACAINE LIPOSOME 1.3 % SUSPENSION: Performed by: ANESTHESIOLOGY

## 2023-01-10 PROCEDURE — C1776 JOINT DEVICE (IMPLANTABLE): HCPCS | Performed by: ORTHOPAEDIC SURGERY

## 2023-01-10 PROCEDURE — 25010000002 FENTANYL CITRATE (PF) 100 MCG/2ML SOLUTION: Performed by: NURSE ANESTHETIST, CERTIFIED REGISTERED

## 2023-01-10 PROCEDURE — C9290 INJ, BUPIVACAINE LIPOSOME: HCPCS | Performed by: ANESTHESIOLOGY

## 2023-01-10 PROCEDURE — 23472 RECONSTRUCT SHOULDER JOINT: CPT | Performed by: ORTHOPAEDIC SURGERY

## 2023-01-10 PROCEDURE — 25010000002 FENTANYL CITRATE (PF) 50 MCG/ML SOLUTION: Performed by: NURSE ANESTHETIST, CERTIFIED REGISTERED

## 2023-01-10 PROCEDURE — C1713 ANCHOR/SCREW BN/BN,TIS/BN: HCPCS | Performed by: ORTHOPAEDIC SURGERY

## 2023-01-10 PROCEDURE — 25010000002 ONDANSETRON PER 1 MG: Performed by: NURSE ANESTHETIST, CERTIFIED REGISTERED

## 2023-01-10 PROCEDURE — L3670 SO ACRO/CLAV CAN WEB PRE OTS: HCPCS | Performed by: ORTHOPAEDIC SURGERY

## 2023-01-10 PROCEDURE — 25010000002 NEOSTIGMINE 5 MG/10ML SOLUTION: Performed by: NURSE ANESTHETIST, CERTIFIED REGISTERED

## 2023-01-10 PROCEDURE — 25010000002 VANCOMYCIN PER 500 MG: Performed by: ORTHOPAEDIC SURGERY

## 2023-01-10 PROCEDURE — 25010000002 CEFAZOLIN PER 500 MG: Performed by: ORTHOPAEDIC SURGERY

## 2023-01-10 PROCEDURE — 82962 GLUCOSE BLOOD TEST: CPT

## 2023-01-10 PROCEDURE — 97165 OT EVAL LOW COMPLEX 30 MIN: CPT | Performed by: OCCUPATIONAL THERAPIST

## 2023-01-10 PROCEDURE — 97535 SELF CARE MNGMENT TRAINING: CPT | Performed by: OCCUPATIONAL THERAPIST

## 2023-01-10 PROCEDURE — 25010000002 MIDAZOLAM PER 1 MG: Performed by: ANESTHESIOLOGY

## 2023-01-10 PROCEDURE — 25010000002 FENTANYL CITRATE (PF) 50 MCG/ML SOLUTION: Performed by: ANESTHESIOLOGY

## 2023-01-10 PROCEDURE — 25010000002 PROPOFOL 10 MG/ML EMULSION: Performed by: NURSE ANESTHETIST, CERTIFIED REGISTERED

## 2023-01-10 DEVICE — COMP SHLDR GLENOID ALLIANCE 4PEG SZ5: Type: IMPLANTABLE DEVICE | Site: SHOULDER | Status: FUNCTIONAL

## 2023-01-10 DEVICE — IMPLANTABLE DEVICE
Type: IMPLANTABLE DEVICE | Site: SHOULDER | Status: FUNCTIONAL
Brand: COMPREHENSIVE SHOULDER SYSTEM

## 2023-01-10 DEVICE — CMT BONE R 1X40: Type: IMPLANTABLE DEVICE | Site: SHOULDER | Status: FUNCTIONAL

## 2023-01-10 DEVICE — DEV CONTRL TISS STRATAFIX SPIRAL MNCRYL UD 3/0 PLS 30CM: Type: IMPLANTABLE DEVICE | Site: SHOULDER | Status: FUNCTIONAL

## 2023-01-10 DEVICE — POST MOD GLEN ALLIANCE/TM: Type: IMPLANTABLE DEVICE | Site: SHOULDER | Status: FUNCTIONAL

## 2023-01-10 DEVICE — SUT NONABS MAXBRAID/PE NMBR2 C7 38IN BLU 900335: Type: IMPLANTABLE DEVICE | Site: SHOULDER | Status: FUNCTIONAL

## 2023-01-10 DEVICE — IMPLANTABLE DEVICE: Type: IMPLANTABLE DEVICE | Site: SHOULDER | Status: FUNCTIONAL

## 2023-01-10 DEVICE — TOTL SHLDER: Type: IMPLANTABLE DEVICE | Site: SHOULDER | Status: FUNCTIONAL

## 2023-01-10 RX ORDER — ACETAMINOPHEN 325 MG/1
650 TABLET ORAL ONCE AS NEEDED
Status: DISCONTINUED | OUTPATIENT
Start: 2023-01-10 | End: 2023-01-10 | Stop reason: HOSPADM

## 2023-01-10 RX ORDER — NEOSTIGMINE METHYLSULFATE 0.5 MG/ML
INJECTION, SOLUTION INTRAVENOUS AS NEEDED
Status: DISCONTINUED | OUTPATIENT
Start: 2023-01-10 | End: 2023-01-10 | Stop reason: SURG

## 2023-01-10 RX ORDER — LIDOCAINE HYDROCHLORIDE 20 MG/ML
INJECTION, SOLUTION INTRAVENOUS AS NEEDED
Status: DISCONTINUED | OUTPATIENT
Start: 2023-01-10 | End: 2023-01-10 | Stop reason: SURG

## 2023-01-10 RX ORDER — FENTANYL CITRATE 50 UG/ML
50 INJECTION, SOLUTION INTRAMUSCULAR; INTRAVENOUS
Status: DISCONTINUED | OUTPATIENT
Start: 2023-01-10 | End: 2023-01-10 | Stop reason: HOSPADM

## 2023-01-10 RX ORDER — ACETAMINOPHEN 500 MG
1000 TABLET ORAL ONCE
Status: COMPLETED | OUTPATIENT
Start: 2023-01-10 | End: 2023-01-10

## 2023-01-10 RX ORDER — GLYCOPYRROLATE 0.2 MG/ML
INJECTION INTRAMUSCULAR; INTRAVENOUS AS NEEDED
Status: DISCONTINUED | OUTPATIENT
Start: 2023-01-10 | End: 2023-01-10 | Stop reason: SURG

## 2023-01-10 RX ORDER — CEFAZOLIN SODIUM IN 0.9 % NACL 3 G/100 ML
3 INTRAVENOUS SOLUTION, PIGGYBACK (ML) INTRAVENOUS ONCE
Status: COMPLETED | OUTPATIENT
Start: 2023-01-10 | End: 2023-01-10

## 2023-01-10 RX ORDER — PROMETHAZINE HYDROCHLORIDE 25 MG/1
25 TABLET ORAL ONCE AS NEEDED
Status: DISCONTINUED | OUTPATIENT
Start: 2023-01-10 | End: 2023-01-10 | Stop reason: HOSPADM

## 2023-01-10 RX ORDER — HYDROCODONE BITARTRATE AND ACETAMINOPHEN 5; 325 MG/1; MG/1
1 TABLET ORAL ONCE AS NEEDED
Status: DISCONTINUED | OUTPATIENT
Start: 2023-01-10 | End: 2023-01-10 | Stop reason: HOSPADM

## 2023-01-10 RX ORDER — ACETAMINOPHEN 500 MG
500 TABLET ORAL EVERY 6 HOURS PRN
Qty: 60 TABLET | Refills: 0 | Status: SHIPPED | OUTPATIENT
Start: 2023-01-10

## 2023-01-10 RX ORDER — DROPERIDOL 2.5 MG/ML
0.62 INJECTION, SOLUTION INTRAMUSCULAR; INTRAVENOUS ONCE AS NEEDED
Status: DISCONTINUED | OUTPATIENT
Start: 2023-01-10 | End: 2023-01-10 | Stop reason: HOSPADM

## 2023-01-10 RX ORDER — ONDANSETRON 2 MG/ML
4 INJECTION INTRAMUSCULAR; INTRAVENOUS ONCE AS NEEDED
Status: DISCONTINUED | OUTPATIENT
Start: 2023-01-10 | End: 2023-01-10 | Stop reason: HOSPADM

## 2023-01-10 RX ORDER — MELOXICAM 15 MG/1
15 TABLET ORAL ONCE
Status: COMPLETED | OUTPATIENT
Start: 2023-01-10 | End: 2023-01-10

## 2023-01-10 RX ORDER — HYDROMORPHONE HYDROCHLORIDE 1 MG/ML
0.5 INJECTION, SOLUTION INTRAMUSCULAR; INTRAVENOUS; SUBCUTANEOUS
Status: DISCONTINUED | OUTPATIENT
Start: 2023-01-10 | End: 2023-01-10 | Stop reason: HOSPADM

## 2023-01-10 RX ORDER — TRANEXAMIC ACID 100 MG/ML
INJECTION, SOLUTION INTRAVENOUS AS NEEDED
Status: DISCONTINUED | OUTPATIENT
Start: 2023-01-10 | End: 2023-01-10 | Stop reason: SURG

## 2023-01-10 RX ORDER — MIDAZOLAM HYDROCHLORIDE 1 MG/ML
1 INJECTION INTRAMUSCULAR; INTRAVENOUS
Status: DISCONTINUED | OUTPATIENT
Start: 2023-01-10 | End: 2023-01-10 | Stop reason: HOSPADM

## 2023-01-10 RX ORDER — SODIUM CHLORIDE, SODIUM LACTATE, POTASSIUM CHLORIDE, CALCIUM CHLORIDE 600; 310; 30; 20 MG/100ML; MG/100ML; MG/100ML; MG/100ML
9 INJECTION, SOLUTION INTRAVENOUS CONTINUOUS
Status: DISCONTINUED | OUTPATIENT
Start: 2023-01-10 | End: 2023-01-10 | Stop reason: HOSPADM

## 2023-01-10 RX ORDER — DOCUSATE SODIUM 100 MG/1
100 CAPSULE, LIQUID FILLED ORAL 2 TIMES DAILY
Qty: 60 CAPSULE | Refills: 0 | Status: SHIPPED | OUTPATIENT
Start: 2023-01-10

## 2023-01-10 RX ORDER — ACETAMINOPHEN 650 MG/1
650 SUPPOSITORY RECTAL ONCE AS NEEDED
Status: DISCONTINUED | OUTPATIENT
Start: 2023-01-10 | End: 2023-01-10 | Stop reason: HOSPADM

## 2023-01-10 RX ORDER — PROPOFOL 10 MG/ML
VIAL (ML) INTRAVENOUS AS NEEDED
Status: DISCONTINUED | OUTPATIENT
Start: 2023-01-10 | End: 2023-01-10 | Stop reason: SURG

## 2023-01-10 RX ORDER — ROCURONIUM BROMIDE 10 MG/ML
INJECTION, SOLUTION INTRAVENOUS AS NEEDED
Status: DISCONTINUED | OUTPATIENT
Start: 2023-01-10 | End: 2023-01-10 | Stop reason: SURG

## 2023-01-10 RX ORDER — FLUMAZENIL 0.1 MG/ML
0.2 INJECTION INTRAVENOUS AS NEEDED
Status: DISCONTINUED | OUTPATIENT
Start: 2023-01-10 | End: 2023-01-10 | Stop reason: HOSPADM

## 2023-01-10 RX ORDER — ONDANSETRON 4 MG/1
4 TABLET, FILM COATED ORAL EVERY 8 HOURS PRN
Qty: 12 TABLET | Refills: 0 | Status: SHIPPED | OUTPATIENT
Start: 2023-01-10 | End: 2023-02-02

## 2023-01-10 RX ORDER — FENTANYL CITRATE 50 UG/ML
INJECTION, SOLUTION INTRAMUSCULAR; INTRAVENOUS AS NEEDED
Status: DISCONTINUED | OUTPATIENT
Start: 2023-01-10 | End: 2023-01-10 | Stop reason: SURG

## 2023-01-10 RX ORDER — ALBUTEROL SULFATE 2.5 MG/3ML
2.5 SOLUTION RESPIRATORY (INHALATION) ONCE AS NEEDED
Status: DISCONTINUED | OUTPATIENT
Start: 2023-01-10 | End: 2023-01-10 | Stop reason: HOSPADM

## 2023-01-10 RX ORDER — DROPERIDOL 2.5 MG/ML
0.62 INJECTION, SOLUTION INTRAMUSCULAR; INTRAVENOUS
Status: DISCONTINUED | OUTPATIENT
Start: 2023-01-10 | End: 2023-01-10 | Stop reason: HOSPADM

## 2023-01-10 RX ORDER — PHENYLEPHRINE HCL IN 0.9% NACL 1 MG/10 ML
SYRINGE (ML) INTRAVENOUS AS NEEDED
Status: DISCONTINUED | OUTPATIENT
Start: 2023-01-10 | End: 2023-01-10 | Stop reason: SURG

## 2023-01-10 RX ORDER — EPHEDRINE SULFATE 50 MG/ML
5 INJECTION, SOLUTION INTRAVENOUS ONCE AS NEEDED
Status: DISCONTINUED | OUTPATIENT
Start: 2023-01-10 | End: 2023-01-10 | Stop reason: HOSPADM

## 2023-01-10 RX ORDER — HYDRALAZINE HYDROCHLORIDE 20 MG/ML
5 INJECTION INTRAMUSCULAR; INTRAVENOUS
Status: DISCONTINUED | OUTPATIENT
Start: 2023-01-10 | End: 2023-01-10 | Stop reason: HOSPADM

## 2023-01-10 RX ORDER — PREGABALIN 75 MG/1
150 CAPSULE ORAL ONCE
Status: COMPLETED | OUTPATIENT
Start: 2023-01-10 | End: 2023-01-10

## 2023-01-10 RX ORDER — EPHEDRINE SULFATE 50 MG/ML
INJECTION INTRAVENOUS AS NEEDED
Status: DISCONTINUED | OUTPATIENT
Start: 2023-01-10 | End: 2023-01-10 | Stop reason: SURG

## 2023-01-10 RX ORDER — FAMOTIDINE 10 MG/ML
20 INJECTION, SOLUTION INTRAVENOUS ONCE
Status: COMPLETED | OUTPATIENT
Start: 2023-01-10 | End: 2023-01-10

## 2023-01-10 RX ORDER — HYDROCODONE BITARTRATE AND ACETAMINOPHEN 7.5; 325 MG/1; MG/1
2 TABLET ORAL ONCE AS NEEDED
Status: DISCONTINUED | OUTPATIENT
Start: 2023-01-10 | End: 2023-01-10 | Stop reason: HOSPADM

## 2023-01-10 RX ORDER — BUPIVACAINE HYDROCHLORIDE 2.5 MG/ML
INJECTION, SOLUTION EPIDURAL; INFILTRATION; INTRACAUDAL
Status: COMPLETED | OUTPATIENT
Start: 2023-01-10 | End: 2023-01-10

## 2023-01-10 RX ORDER — ONDANSETRON 2 MG/ML
INJECTION INTRAMUSCULAR; INTRAVENOUS AS NEEDED
Status: DISCONTINUED | OUTPATIENT
Start: 2023-01-10 | End: 2023-01-10 | Stop reason: SURG

## 2023-01-10 RX ORDER — HYDROCODONE BITARTRATE AND ACETAMINOPHEN 7.5; 325 MG/1; MG/1
1 TABLET ORAL EVERY 4 HOURS PRN
Qty: 42 TABLET | Refills: 0 | Status: SHIPPED | OUTPATIENT
Start: 2023-01-10 | End: 2023-01-25 | Stop reason: SDUPTHER

## 2023-01-10 RX ORDER — ONDANSETRON 4 MG/1
4 TABLET, FILM COATED ORAL ONCE AS NEEDED
Status: DISCONTINUED | OUTPATIENT
Start: 2023-01-10 | End: 2023-01-10 | Stop reason: HOSPADM

## 2023-01-10 RX ORDER — LIDOCAINE HYDROCHLORIDE 10 MG/ML
0.5 INJECTION, SOLUTION EPIDURAL; INFILTRATION; INTRACAUDAL; PERINEURAL ONCE AS NEEDED
Status: DISCONTINUED | OUTPATIENT
Start: 2023-01-10 | End: 2023-01-10 | Stop reason: HOSPADM

## 2023-01-10 RX ORDER — LABETALOL HYDROCHLORIDE 5 MG/ML
5 INJECTION, SOLUTION INTRAVENOUS
Status: DISCONTINUED | OUTPATIENT
Start: 2023-01-10 | End: 2023-01-10 | Stop reason: HOSPADM

## 2023-01-10 RX ORDER — VANCOMYCIN HYDROCHLORIDE 1 G/200ML
1000 INJECTION, SOLUTION INTRAVENOUS ONCE
Status: COMPLETED | OUTPATIENT
Start: 2023-01-10 | End: 2023-01-10

## 2023-01-10 RX ORDER — NALOXONE HCL 0.4 MG/ML
0.2 VIAL (ML) INJECTION AS NEEDED
Status: DISCONTINUED | OUTPATIENT
Start: 2023-01-10 | End: 2023-01-10 | Stop reason: HOSPADM

## 2023-01-10 RX ORDER — SODIUM CHLORIDE 0.9 % (FLUSH) 0.9 %
3 SYRINGE (ML) INJECTION EVERY 12 HOURS SCHEDULED
Status: DISCONTINUED | OUTPATIENT
Start: 2023-01-10 | End: 2023-01-10 | Stop reason: HOSPADM

## 2023-01-10 RX ORDER — PROMETHAZINE HYDROCHLORIDE 25 MG/1
25 SUPPOSITORY RECTAL ONCE AS NEEDED
Status: DISCONTINUED | OUTPATIENT
Start: 2023-01-10 | End: 2023-01-10 | Stop reason: HOSPADM

## 2023-01-10 RX ORDER — DEXAMETHASONE SODIUM PHOSPHATE 4 MG/ML
INJECTION, SOLUTION INTRA-ARTICULAR; INTRALESIONAL; INTRAMUSCULAR; INTRAVENOUS; SOFT TISSUE AS NEEDED
Status: DISCONTINUED | OUTPATIENT
Start: 2023-01-10 | End: 2023-01-10 | Stop reason: SURG

## 2023-01-10 RX ORDER — SODIUM CHLORIDE 0.9 % (FLUSH) 0.9 %
3-10 SYRINGE (ML) INJECTION AS NEEDED
Status: DISCONTINUED | OUTPATIENT
Start: 2023-01-10 | End: 2023-01-10 | Stop reason: HOSPADM

## 2023-01-10 RX ORDER — MAGNESIUM HYDROXIDE 1200 MG/15ML
LIQUID ORAL AS NEEDED
Status: DISCONTINUED | OUTPATIENT
Start: 2023-01-10 | End: 2023-01-10 | Stop reason: HOSPADM

## 2023-01-10 RX ORDER — CEFAZOLIN SODIUM 2 G/100ML
2 INJECTION, SOLUTION INTRAVENOUS EVERY 8 HOURS
Status: DISCONTINUED | OUTPATIENT
Start: 2023-01-10 | End: 2023-01-10 | Stop reason: HOSPADM

## 2023-01-10 RX ADMIN — Medication 100 MCG: at 08:33

## 2023-01-10 RX ADMIN — DEXAMETHASONE SODIUM PHOSPHATE 6 MG: 4 INJECTION, SOLUTION INTRAMUSCULAR; INTRAVENOUS at 07:10

## 2023-01-10 RX ADMIN — HYDROCODONE BITARTRATE AND ACETAMINOPHEN 1 TABLET: 7.5; 325 TABLET ORAL at 09:30

## 2023-01-10 RX ADMIN — ROCURONIUM BROMIDE 50 MG: 10 INJECTION, SOLUTION INTRAVENOUS at 06:58

## 2023-01-10 RX ADMIN — VANCOMYCIN HYDROCHLORIDE 1000 MG: 1 INJECTION, SOLUTION INTRAVENOUS at 06:15

## 2023-01-10 RX ADMIN — MIDAZOLAM 1 MG: 1 INJECTION INTRAMUSCULAR; INTRAVENOUS at 06:25

## 2023-01-10 RX ADMIN — EPHEDRINE SULFATE 10 MG: 50 INJECTION INTRAVENOUS at 07:47

## 2023-01-10 RX ADMIN — ACETAMINOPHEN 1000 MG: 500 TABLET, FILM COATED ORAL at 06:10

## 2023-01-10 RX ADMIN — FENTANYL CITRATE 50 MCG: 50 INJECTION, SOLUTION INTRAMUSCULAR; INTRAVENOUS at 06:25

## 2023-01-10 RX ADMIN — Medication 100 MCG: at 08:25

## 2023-01-10 RX ADMIN — FENTANYL CITRATE 50 MCG: 50 INJECTION INTRAMUSCULAR; INTRAVENOUS at 08:42

## 2023-01-10 RX ADMIN — LIDOCAINE HYDROCHLORIDE 100 MG: 20 INJECTION, SOLUTION INTRAVENOUS at 06:58

## 2023-01-10 RX ADMIN — NEOSTIGMINE METHYLSULFATE 3.5 MG: 0.5 INJECTION INTRAVENOUS at 08:40

## 2023-01-10 RX ADMIN — PREGABALIN 150 MG: 75 CAPSULE ORAL at 06:10

## 2023-01-10 RX ADMIN — GLYCOPYRROLATE 0.5 MCG: 1 INJECTION INTRAMUSCULAR; INTRAVENOUS at 08:40

## 2023-01-10 RX ADMIN — BUPIVACAINE HYDROCHLORIDE 10 ML: 2.5 INJECTION, SOLUTION EPIDURAL; INFILTRATION; INTRACAUDAL; PERINEURAL at 06:35

## 2023-01-10 RX ADMIN — ONDANSETRON 4 MG: 2 INJECTION INTRAMUSCULAR; INTRAVENOUS at 07:38

## 2023-01-10 RX ADMIN — FENTANYL CITRATE 50 MCG: 50 INJECTION, SOLUTION INTRAMUSCULAR; INTRAVENOUS at 09:42

## 2023-01-10 RX ADMIN — MELOXICAM 15 MG: 15 TABLET ORAL at 06:10

## 2023-01-10 RX ADMIN — HYDROCODONE BITARTRATE AND ACETAMINOPHEN 1 TABLET: 7.5; 325 TABLET ORAL at 09:54

## 2023-01-10 RX ADMIN — TRANEXAMIC ACID 1000 MG: 1 INJECTION, SOLUTION INTRAVENOUS at 07:25

## 2023-01-10 RX ADMIN — SODIUM CHLORIDE, POTASSIUM CHLORIDE, SODIUM LACTATE AND CALCIUM CHLORIDE 9 ML/HR: 600; 310; 30; 20 INJECTION, SOLUTION INTRAVENOUS at 06:19

## 2023-01-10 RX ADMIN — CEFAZOLIN 3 G: 10 INJECTION, POWDER, FOR SOLUTION INTRAVENOUS at 06:48

## 2023-01-10 RX ADMIN — EPHEDRINE SULFATE 10 MG: 50 INJECTION INTRAVENOUS at 08:14

## 2023-01-10 RX ADMIN — FAMOTIDINE 20 MG: 10 INJECTION INTRAVENOUS at 06:21

## 2023-01-10 RX ADMIN — PROPOFOL 250 MG: 10 INJECTION, EMULSION INTRAVENOUS at 06:58

## 2023-01-10 RX ADMIN — EPHEDRINE SULFATE 10 MG: 50 INJECTION INTRAVENOUS at 08:06

## 2023-01-10 RX ADMIN — SODIUM CHLORIDE, POTASSIUM CHLORIDE, SODIUM LACTATE AND CALCIUM CHLORIDE: 600; 310; 30; 20 INJECTION, SOLUTION INTRAVENOUS at 08:25

## 2023-01-10 RX ADMIN — BUPIVACAINE 15 ML: 13.3 INJECTION, SUSPENSION, LIPOSOMAL INFILTRATION at 06:35

## 2023-01-10 NOTE — OP NOTE
Orthopaedic Operative Note    Facility: [facility]    Patient: Collin Sandoval    Medical Record Number: [id number]    YOB: 1960    Dictating Surgeon: Zane Moe M.D.    Primary Care Physician: Christi Farias APRN    Date of Operation:  1/10/2023    PREOPERATIVE DIAGNOSIS: Right shoulder end-stage osteoarthritis    POSTOPERATIVE DIAGNOSIS: Right shoulder end-stage osteoarthritis    PROCEDURES PERFORMED:    1.  Right total shoulder arthroplasty    2.  Tenodesis of long head of biceps tendon    SURGEON: Zane Moe MD     ASSISTANT: Vibha Fatima whose assistance was critical for help with patient positioning, suctioning and irrigation, retraction, manipulation of the extremity for insertion of the implants, wound closure and application of the bandages.  Her assistance was critical to the success of this case.     ANESTHESIA: Regional followed General solution    SPECIMEN: None.     COMPLICATION: None.     ESTIMATED BLOOD LOSS: Less than 150 mL.     IMPLANTS:     1. Biomet 19 micro comprehensive stem with size 54 x 21 Versa-Dial humeral head    2. Size 5 glenoid with central trabecular metal peg cemented with 1 batch of Palacos bone cement.     INDICATIONS: Mr. Sandoval had a long history of right shoulder pain which has been progressively worsening. The patient has long-standing osteoarthritis which has been nonresponsive to conservative treatment.    INFORMED CONSENT:  The risks, benefits, and alternatives to a total shoulder arthroplasty were discussed with the patient in detail. The patient acknowledged understanding the information and consented to proceed.  I explained that surgical risks include infection, hematoma, hardware related complications including failure of fixation, loosening, fracture, subscapularis repair failure and/or rotator cuff tear necessitating revision surgery, persistent pain and/or loss of motion, iatrogenic nerve and/or blood vessel injury  resulting in permanent weakness, numbness or dysfunction, DVT, PE, positioning related neuropraxia, and anesthesia related complications resulting in death.  I did explain the possible need for reverse arthroplasty depending upon the degree of retroversion and the status of his rotator cuff at the time of his surgery.  I further explained the risks inherent to reverse arthroplasty as compared to a standard total shoulder.  Specifically, we discussed the risks of notching, glenoid loosening, instability, and traction related neuropraxia, any of which could result in persistent pain or problems requiring further surgery.     DESCRIPTION OF PROCEDURE: The patient and operative site were identified in the preoperative holding area. The surgical site was marked. Following this, adequate regional anesthesia of the right upper extremity was administered. The patient was then taken to the operating room and placed in the supine position. Adequate general anesthesia was administered and then the patient was repositioned on the operating table in the modified beach chair position. The head and neck were placed in neutral alignment and all bony prominences were carefully padded and protected. Once we had the patient appropriately positioned, a timeout was taken. Preoperative antibiotics were administered.     I began the procedure by cleaning the extremity with an alcohol solution, a Hibiclens scrub was performed, and then the extremity was prepped with ChloraPrep x2. I allowed those to dry for 3 minutes before the draping procedure was carried out. Next, an approximately 8 cm incision was fashioned over the anterior aspect of the shoulder centered over the deltopectoral interval. Full-thickness medial and lateral skin flaps were developed and the cephalic vein dissected out. This structure was retracted laterally and kept protected throughout the duration of the case. The underlying clavipectoral fascia was divided and the  strap muscles were retracted medially. The anterior circumflex vessels were suture-ligated with 3-0 silk ties. The subscapularis was then tagged and released off the lesser tuberosity, careful to leave a small cuff of tissue to allow for a later anatomic repair of that structure.     The long head of the biceps was dissected out of the groove. This was released off of its attachment to the supraglenoid tubercle and then the diseased intraarticular portion of the biceps removed. The biceps was then tenodesed to the pectoralis tendon using two #2 MaxBraid sutures.     Once I completed that process, I then redirected my attention to the humeral side. There were large osteophytes surrounding the periphery of the head. These were removed at this point with a curved osteotome and rongeur. Once I completed that process, I had good exposure of the head. The cuff did appear to be intact and so I determined that we could proceed with a total shoulder arthroplasty. The cutting guide for the head cut was inserted. This was pinned into position at 30 degrees of retroversion as referenced off the forearm. The head cut was then carried out in typical fashion, careful to preserve the rotator cuff attachments during the cutting. The cut portion of the head was taken to the back table and measured. It measured a 54 in diameter.  Having completed the head cut and removal of the osteophytes, I then directed my attention to the glenoid. I dissected out the axillary nerve and made sure I had that structure identified and protected. Once the nerve was identified and protected, the subscapularis mobilization was performed. I was able to get a 360 degree release of the subscapularis and excellent mobilization of that structure without jeopardizing the axillary nerve.      Once I had completed that process, the glenoid was exposed. The superior, anterior, and inferior glenoid labral tissues were carefully removed to expose the glenoid. I  measured it. It measured a size 5. I pinned the center pin and then reamed and prepared the glenoid in typical fashion.     I had my assistant mix one batch of Palacos bone cement on the back table using current cement mixing technique. Once we completed that process and I drilled the 3 peg holes as well as the central hole for the central peg, the implant was impacted into position with cement in the peg holes and on the back of the implant.  This seated well and was firmly secure. I held that in place until the cement had cured. The excess extruded cement was removed with a [place] elevator. Next, I directed my attention back to the humerus. The humerus was reamed and broached up to a 19 broach where good fill was achieved. I elected to go with a size 19 stem. I drilled 2 drill holes in the lesser tuberosity and 2 transosseous sutures were placed through those to allow for a combined transosseous/transtendinous repair of the subscapularis. The implant was impacted into position. The size 19 stem seated well.      I then trialed with multiple head components.  The 54 x 21 fit very well and demonstrated excellent motion and stability.  This was a little thinner than the cut portion of the head but the stem was sitting up about 2 or 3 mm and the total width there matched up pretty perfectly with what we had cut.  I could translate the head roughly 50% posteriorly and 50% inferiorly and reduce right back onto the glenoid. Again, it seemed to fit very well and I was satisfied that was the appropriate size for implantation in this case.     The trial head component was removed and the final component was impacted into position. I took care to make sure the Watson taper was fully secure before reducing the shoulder and again carrying it through a range of motion. Again, the shoulder demonstrated excellent motion and stability. Next, I irrigated out with 500 mL of a Betadine-containing saline solution followed by 1 L of  sterile saline mixed with bacitracin via pulsatile lavage.     I placed the arm in about 30 degrees of external rotation. The subscapularis repair was performed in the typical fashion using the previously placed transosseous sutures as well as multiple transtendinous sutures. Two sutures were used to close down the defect in the rotator interval as well. I was able to get an excellent repair in the subscapularis without any significant tension. The shoulder demonstrated good motion and stability. Therefore, I directed my attention to final closure. I confirmed the axillary nerve was intact at this point and then I irrigated out with another 2 L of sterile saline via pulsatile lavage. I made sure we had good hemostasis.      The wound was then sequentially closed in layers using Vicryl stitches to the deep tissues and a running subcuticular Monocryl stitch followed by Steri-Strips for the skin. Sterile dressings were applied to the wound and the drapes withdrawn. The patient tolerated the procedure well. There were no complications.  Mr. Sandoval was taken to the recovery room in good condition.  His arm was placed in a sling prior to transfer to the recovery room.     Zane Moe M.D.  1/10/2023    cc: Christi Farias APRN

## 2023-01-10 NOTE — BRIEF OP NOTE
3D MAPPING [facility] EP  Progress Note    Collin Sandoval  1/10/2023    Pre-op Diagnosis:   Arthritis of shoulder [M19.019]       Post-Op Diagnosis Codes:     * Arthritis of shoulder [M19.019]    Procedure/CPT® Codes:        Procedure(s):  TOTAL SHOULDER ARTHROPLASTY, biceps tenodesis        Surgeon(s):  Zane Moe MD    Anesthesia: General with Block    Staff:   Circulator: Julisa Wong RN  Scrub Person: Radha Tovar; Sonia Garza RN  Vendor Representative: Devonte Barnes  Assistant: Caitlyn Villafuerte APRN  Assistant: Caitlyn Villafuerte APRN      Estimated Blood Loss: 100ml    Urine Voided: * No values recorded between 1/10/2023  6:52 AM and 1/10/2023  8:25 AM *    Specimens:                None          Drains: * No LDAs found *    Findings: see dictation        Complications: none    Assistant: Caitlyn Villafuerte APRN  was responsible for performing the following activities: Retraction and their skilled assistance was necessary for the success of this case.    Zane Moe MD     Date: 1/10/2023  Time: 08:45 EST

## 2023-01-10 NOTE — ANESTHESIA PREPROCEDURE EVALUATION
Anesthesia Evaluation     Patient summary reviewed and Nursing notes reviewed   NPO Solid Status: > 8 hours             Airway   Mallampati: II  TM distance: >3 FB  Neck ROM: full  No difficulty expected  Dental - normal exam     Pulmonary - normal exam   (+) asthma,  Cardiovascular - normal exam    ECG reviewed    (+) hypertension, hyperlipidemia,   (-) angina, [place]      Neuro/Psych  (+) psychiatric history Anxiety,    GI/Hepatic/Renal/Endo    (+) obesity,   diabetes mellitus type 2 well controlled,     Musculoskeletal     Abdominal  - normal exam    Bowel sounds: normal.   Substance History      OB/GYN          Other   arthritis,                      Anesthesia Plan    ASA 3     general with block       Anesthetic plan, risks, benefits, and alternatives have been provided, discussed and informed consent has been obtained with: patient.        CODE STATUS:

## 2023-01-10 NOTE — ANESTHESIA PROCEDURE NOTES
Airway  Urgency: elective    Date/Time: 1/10/2023 7:04 AM  Airway not difficult    General Information and Staff    Patient location during procedure: OR  Anesthesiologist: Collin Blank MD  CRNA/CAA: [place] Mercer CRNA    Indications and Patient Condition  Indications for airway management: airway protection    Preoxygenated: yes (pt pre-O2 with 100% O2)  Mask difficulty assessment: 2 - vent by mask + OA or adjuvant +/- NMBA (easy BMV )    Final Airway Details  Final airway type: endotracheal airway      Successful airway: ETT  Cuffed: yes   Successful intubation technique: direct laryngoscopy  Endotracheal tube insertion site: oral  Blade: Rosa  Blade size: 4  ETT size (mm): 7.5  Cormack-Lehane Classification: grade I - full view of glottis  Placement verified by: chest auscultation and capnometry   Cuff volume (mL): 7  Measured from: lips  ETT/EBT  to lips (cm): 23  Number of attempts at approach: 1  Assessment: lips, teeth, and gum same as pre-op and atraumatic intubation    Additional Comments  ATOETx1. No change in dentition.

## 2023-01-10 NOTE — ANESTHESIA POSTPROCEDURE EVALUATION
Patient: Collin Sandoval    Procedure Summary     Date: 01/10/23 Room / Location: [facility] OR [address] [place] [address] J[facility] OR [facility]    Anesthesia Start: 0652 Anesthesia Stop: 0906    Procedure: TOTAL SHOULDER ARTHROPLASTY (Right: Shoulder) Diagnosis:       Arthritis of shoulder      (Arthritis of shoulder [M19.019])    Surgeons: Zane Moe MD Provider: Collin Blank MD    Anesthesia Type: general with block ASA Status: 3          Anesthesia Type: general with block    Vitals  Vitals Value Taken Time   /85 01/10/23 1030   Temp 36.5 °C (97.7 °F) 01/10/23 1015   Pulse 89 01/10/23 1051   Resp 14 01/10/23 1030   SpO2 92 % 01/10/23 1051   Vitals shown include unvalidated device data.        Post Anesthesia Care and Evaluation    Patient location during evaluation: bedside  Patient participation: complete - patient participated  Level of consciousness: awake  Pain management: adequate    Airway patency: patent  Anesthetic complications: No anesthetic complications    Cardiovascular status: acceptable  Respiratory status: acceptable  Hydration status: acceptable    Comments: */85   Pulse 86   Temp 36.5 °C (97.7 °F) (Oral)   Resp 14   SpO2 92%

## 2023-01-10 NOTE — PLAN OF CARE
Goal Outcome Evaluation:  Plan of Care Reviewed With: patient, spouse        Progress: improving  Outcome Evaluation: pt is POD 0 R total shoulder. pt and wife ed on precautions post surgery, NWB status, how to don/doff sling UBD tech, HEP w ex and ADL techniques. pt demo ADL w A and some of the UE ex w L UE as he became nausted posted getting dressed, while block did not take effect pt will begin R sh HEP tomorrow. pt to dc home w HEP, wife, and out pt therapy to start per family after his f/u w his doctor. pt wife don sling and ed on sling technique.

## 2023-01-10 NOTE — THERAPY DISCHARGE NOTE
Acute Care - Occupational Therapy Discharge  [facility]    Patient Name: Collin Sandoval  : 1960    MRN: [id number]                              Today's Date: 1/10/2023       Admit Date: 1/10/2023    Visit Dx:     ICD-10-CM ICD-9-CM   1. H/O total shoulder replacement, right  Z96.611 V43.61   2. Arthritis of shoulder  M19.019 716.91     Patient Active Problem List   Diagnosis   • Type 2 diabetes mellitus with hyperglycemia, without long-term current use of insulin ([facility])   • Peripheral polyneuropathy   • Primary osteoarthritis   • Anxiety   • Primary hypertension   • Hyperlipidemia   • Arthritis of shoulder     Past Medical History:   Diagnosis Date   • Arthritis    • Arthritis of shoulder 2022   • Asthma    • Colon polyps    • Diabetes ([facility])    • Morbid (severe) obesity due to excess calories ([facility]) 2022     Past Surgical History:   Procedure Laterality Date   • COLONOSCOPY     • JOINT REPLACEMENT  Right Shoulder      General Information     Row Name 01/10/23 1304          OT Time and Intention    Document Type discharge evaluation/summary  -     Mode of Treatment individual therapy;occupational therapy  -     Row Name 01/10/23 1304          General Information    Patient Profile Reviewed yes  -     Prior Level of Function independent:;all household mobility;community mobility;gait;transfer;bed mobility;ADL's  -     Existing Precautions/Restrictions shoulder;right;non-weight bearing  -     Barriers to Rehab none identified  -     Row Name 01/10/23 1304          Living Environment    People in Home spouse  -     Row Name 01/10/23 1304          Cognition    Orientation Status (Cognition) oriented x 4  -           User Key  (r) = Recorded By, (t) = Taken By, (c) = Cosigned By    Initials Name Provider Type     Vianey Castillo, OTR Occupational Therapist               Mobility/ADL's     Row Name 01/10/23 1306          Bed Mobility    Comment, (Bed Mobility) [facility]  -     Row  Name 01/10/23 1305          Transfers    Transfers sit-stand transfer;stand-sit transfer  -     Row Name 01/10/23 1305          Sit-Stand Transfer    Sit-Stand [place] (Transfers) set up;standby assist  -KP     Row Name 01/10/23 1305          Stand-Sit Transfer    Stand-Sit [place] (Transfers) set up;standby assist  -KP     Row Name 01/10/23 1305          Activities of Daily Living    BADL Assessment/Intervention upper body dressing;lower body dressing  -     Row Name 01/10/23 1305          Mobility    Extremity Weight-bearing Status right upper extremity  -KP     Right Upper Extremity (Weight-bearing Status) non weight-bearing (NWB)  -     Row Name 01/10/23 1305          Upper Body Dressing Assessment/Training    [place] Level (Upper Body Dressing) upper body dressing skills;don;front opening garment;other (see comments);moderate assist (50% patient effort)  sling  -     Position (Upper Body Dressing) supported sitting  -     Comment, (Upper Body Dressing) ed pt UBD tech and wife ed on sling and how to don sling. wife don pt sling  -     Row Name 01/10/23 1305          Lower Body Dressing Assessment/Training    [place] Level (Lower Body Dressing) lower body dressing skills;don;pants/bottoms;shoes/slippers;socks;maximum assist (25% patient effort);moderate assist (50% patient effort)  -     Position (Lower Body Dressing) supported sitting;supported standing  -           User Key  (r) = Recorded By, (t) = Taken By, (c) = Cosigned By    Initials Name Provider Type    Vianey Winters, OTR Occupational Therapist               Obj/Interventions     Row Name 01/10/23 1306          Sensory Assessment (Somatosensory)    Sensory Assessment (Somatosensory) UE sensation intact  -     Sensory Assessment pt block did not take effect  -     Row Name 01/10/23 1306          Range of Motion Comprehensive    Comment, General Range of Motion R WFL w precautions and total sh protocol  followed  -     Row Name 01/10/23 1306          Strength Comprehensive (MMT)    Comment, General Manual Muscle Testing (MMT) Assessment NT but post op weakness expected  -     Row Name 01/10/23 1306          Shoulder (Therapeutic Exercise)    Shoulder (Therapeutic Exercise) pendulum exercises  pt demo w  L UE , pt nauseated after getting dressed  -     Row Name 01/10/23 1306          Elbow/Forearm (Therapeutic Exercise)    Elbow/Forearm (Therapeutic Exercise) AROM (active range of motion)  -     Elbow/Forearm AROM (Therapeutic Exercise) left;flexion;extension;supination;pronation  pt demo w L UE as R UE block is worn off but nauseated  -     Row Name 01/10/23 1306          Motor Skills    Therapeutic Exercise elbow/forearm;shoulder;wrist;hand  -     Row Name 01/10/23 1306          Balance    Balance Assessment sitting static balance;standing static balance  -     Static Sitting Balance independent  -     Position, Sitting Balance sitting in chair  -     Static Standing Balance standby assist  -     Balance Interventions sitting;standing;sit to stand;supported;static  -           User Key  (r) = Recorded By, (t) = Taken By, (c) = Cosigned By    Initials Name Provider Type     Vianey Castillo, OTR Occupational Therapist               Goals/Plan     Row Name 01/10/23 1312          Problem Specific Goal 1 (OT)    Problem Specific Goal 1 (OT) pt and wife ed on HEP, precautions post surgery. NWB status.sling technique and ADL technique.  -     Time Frame (Problem Specific Goal 1, OT) short term goal (STG);1 day  -     Progress/Outcome (Problem Specific Goal 1, OT) goal met  -           User Key  (r) = Recorded By, (t) = Taken By, (c) = Cosigned By    Initials Name Provider Type     Vianey Castillo, OTR Occupational Therapist               Clinical Impression     Row Name 01/10/23 1308          Pain Assessment    Pretreatment Pain Rating 0/10 - no pain  -     Posttreatment  Pain Rating 0/10 - no pain  -     Row Name 01/10/23 1301          Plan of Care Review    Plan of Care Reviewed With patient;spouse  -     Progress improving  -     Outcome Evaluation pt is POD 0 R total shoulder. pt and wife ed on precautions post surgery, NWB status, how to don/doff sling UBD tech, HEP w ex and ADL techniques. pt demo ADL w A and some of the UE ex w L UE as he became nausted posted getting dressed, while block did not take effect pt will begin R sh HEP tomorrow. pt to dc home w HEP, wife, and out pt therapy to start per family after his f/u w his doctor. pt wife don sling and ed on sling technique.  -     Row Name 01/10/23 1308          Therapy Assessment/Plan (OT)    Criteria for Skilled Therapeutic Interventions Met (OT) yes;meets criteria;skilled treatment is necessary  -     Therapy Frequency (OT) evaluation only  -     Row Name 01/10/23 1308          Therapy Plan Review/Discharge Plan (OT)    Anticipated Discharge Disposition (OT) home with assist;home with outpatient therapy services  -     Row Name 01/10/23 1301          Positioning and Restraints    Pre-Treatment Position sitting in chair/recliner  -     Post Treatment Position chair  -KP     In Chair sitting;with family/caregiver;call light within reach;encouraged to call for assist;notified nsg  pt nauseated nsg notified  -           User Key  (r) = Recorded By, (t) = Taken By, (c) = Cosigned By    Initials Name Provider Type     Vianey Castillo, OTR Occupational Therapist               Outcome Measures     Row Name 01/10/23 5076          How much help from another is currently needed...    Putting on and taking off regular lower body clothing? 2  -KP     Bathing (including washing, rinsing, and drying) 3  -KP     Toileting (which includes using toilet bed pan or urinal) 3  -KP     Putting on and taking off regular upper body clothing 2  -KP     Taking care of personal grooming (such as brushing teeth) 3  -KP      Eating meals 3  -     AM-PAC 6 Clicks Score (OT) 16  -     Row Name 01/10/23 1313          Functional Assessment    Outcome Measure Options AM-PAC 6 Clicks Daily Activity (OT)  -           User Key  (r) = Recorded By, (t) = Taken By, (c) = Cosigned By    Initials Name Provider Type    Vianey Winters, OTR Occupational Therapist              Occupational Therapy Education     Title: PT OT SLP Therapies (Resolved)     Topic: Occupational Therapy (Resolved)     Point: ADL training (Resolved)     Description:   Instruct learner(s) on proper safety adaptation and remediation techniques during self care or transfers.   Instruct in proper use of assistive devices.              Learning Progress Summary           Patient Acceptance, E,TB,D,H, VU,DU by  at 1/10/2023 1314    Comment: ed pt on the role of OT. beneft of therapy, ed on pec[place]inos post surgery. NWB status. sling technique. ADL technique and HEP w handout provided   Family Acceptance, E,TB,D,H, VU,DU by  at 1/10/2023 1314    Comment: ed pt on the role of OT. beneft of therapy, ed on pec[place]inos post surgery. NWB status. sling technique. ADL technique and HEP w handout provided                   Point: Home exercise program (Resolved)     Description:   Instruct learner(s) on appropriate technique for monitoring, assisting and/or progressing therapeutic exercises/activities.              Learning Progress Summary           Patient Acceptance, E,TB,D,H, VU,DU by  at 1/10/2023 1314    Comment: ed pt on the role of OT. beneft of therapy, ed on pec[place]inos post surgery. NWB status. sling technique. ADL technique and HEP w handout provided   Family Acceptance, E,TB,D,H, VU,DU by  at 1/10/2023 1314    Comment: ed pt on the role of OT. beneft of therapy, ed on pecauinos post surgery. NWB status. sling technique. ADL technique and HEP w handout provided                   Point: Precautions (Resolved)     Description:   Instruct learner(s) on  prescribed precautions during self-care and functional transfers.              Learning Progress Summary           Patient Acceptance, E,TB,D,H, VU,DU by  at 1/10/2023 1314    Comment: ed pt on the role of OT. beneft of therapy, ed on pecauinos post surgery. NWB status. sling technique. ADL technique and HEP w handout provided   Family Acceptance, E,TB,D,H, VU,DU by  at 1/10/2023 1314    Comment: ed pt on the role of OT. beneft of therapy, ed on pecauinos post surgery. NWB status. sling technique. ADL technique and HEP w handout provided                   Point: Body mechanics (Resolved)     Description:   Instruct learner(s) on proper positioning and spine alignment during self-care, functional mobility activities and/or exercises.              Learning Progress Summary           Patient Acceptance, E,TB,D,H, VU,DU by  at 1/10/2023 1314    Comment: ed pt on the role of OT. beneft of therapy, ed on pecauinos post surgery. NWB status. sling technique. ADL technique and HEP w handout provided   Family Acceptance, E,TB,D,H, VU,DU by  at 1/10/2023 1314    Comment: ed pt on the role of OT. beneft of therapy, ed on pecauinos post surgery. NWB status. sling technique. ADL technique and HEP w handout provided                               User Key     Initials Effective Dates Name Provider Type Discipline     06/16/21 -  Vianey Castillo, OTR Occupational Therapist OT              OT Recommendation and Plan  Therapy Frequency (OT): evaluation only  Plan of Care Review  Plan of Care Reviewed With: patient, spouse  Progress: improving  Outcome Evaluation: pt is POD 0 R total shoulder. pt and wife ed on precautions post surgery, NWB status, how to don/doff sling UBD tech, HEP w ex and ADL techniques. pt demo ADL w A and some of the UE ex w L UE as he became nausted posted getting dressed, while block did not take effect pt will begin R sh HEP tomorrow. pt to dc home w HEP, wife, and out pt therapy to start per  family after his f/u w his doctor. pt wife don sling and ed on sling technique.  Plan of Care Reviewed With: patient, spouse  Outcome Evaluation: pt is POD 0 R total shoulder. pt and wife ed on precautions post surgery, NWB status, how to don/doff sling UBD tech, HEP w ex and ADL techniques. pt demo ADL w A and some of the UE ex w L UE as he became nausted posted getting dressed, while block did not take effect pt will begin R sh HEP tomorrow. pt to dc home w HEP, wife, and out pt therapy to start per family after his f/u w his doctor. pt wife don sling and ed on sling technique.     Time Calculation:    Time Calculation- OT     Row Name 01/10/23 1315             Time Calculation- OT    OT Start Time 1217  -KP      OT Stop Time 1231  -KP      OT Time Calculation (min) 14 min  -KP      Total Timed Code Minutes- OT 8 minute(s)  -KP      OT Received On 01/10/23  -         Timed Charges    65557 - OT Self Care/Mgmt Minutes 8  -KP         Untimed Charges    OT Eval/Re-eval Minutes 5  -KP         Total Minutes    Timed Charges Total Minutes 8  -KP      Untimed Charges Total Minutes 5  -KP       Total Minutes 13  -KP            User Key  (r) = Recorded By, (t) = Taken By, (c) = Cosigned By    Initials Name Provider Type    Vianey Winters OTR Occupational Therapist              Therapy Charges for Today     Code Description Service Date Service Provider Modifiers Qty    15903783175 HC OT SELF CARE/MGMT/TRAIN EA 15 MIN 1/10/2023 Vianey Castillo OTR GO 1    [id number]  OT EVAL LOW COMPLEXITY 2 1/10/2023 Vianey Castillo OTR GO 1             OT Discharge Summary  Anticipated Discharge Disposition (OT): home with assist, home with outpatient therapy services  Reason for Discharge: All goals achieved, Discharge from facility  Discharge Destination: Home with outpatient services, Home with assist    OCTAVIO [place]  1/10/2023

## 2023-01-10 NOTE — ANESTHESIA PROCEDURE NOTES
Peripheral Block      Patient reassessed immediately prior to procedure    Patient location during procedure: pre-op  Reason for block: at surgeon's request and post-op pain management  Performed by  Anesthesiologist: Collin Blank MD  Preanesthetic Checklist  Completed: patient identified, risks and benefits discussed, surgical consent, monitors and equipment checked, pre-op evaluation and timeout performed  Prep:  Pt Position: supine  Sterile barriers:cap, gloves and mask  Prep: ChloraPrep  Patient monitoring: blood pressure monitoring, continuous pulse oximetry and EKG  Procedure    Sedation: yes  Performed under: local infiltration  Guidance:ultrasound guided    ULTRASOUND INTERPRETATION.  Using ultrasound guidance a gauge needle was placed in close proximity to the brachial plexus nerve, at which point, under ultrasound guidance anesthetic was injected in the area of the nerve and spread of the anesthesia was seen on ultrasound in close proximity thereto.  There were no abnormalities seen on ultrasound; a digital image was taken; and the patient tolerated the procedure with no complications. Images:still images obtained, printed/placed on chart    Laterality:right  Block Type:interscalene  Injection Technique:single-shot    Medications Used: bupivacaine liposome (EXPAREL) 1.3 % injection - Infiltration   15 mL - 1/10/2023 6:35:00 AM  bupivacaine PF (MARCAINE) 0.25 % injection - Injection   10 mL - 1/10/2023 6:35:00 AM      Post Assessment  Patient Tolerance:comfortable throughout block  Complications:no  Additional Notes  Ultrasound Interpretation:  Using ultrasound guidance the needle was placed in close proximity to the brachial plexus and anesthetic was injected in the area of the nerve and spread of the anesthetic was seen on ultrasound in close proximity thereto.  There were no abnormalities seen on ultrasound; a digital image was taken; and the patient tolerated the procedure with no complications.

## 2023-01-10 NOTE — DISCHARGE INSTRUCTIONS
What to expect after a Nerve Block    Nerve blocks administered to block pain affect many types of nerves, including those nerves that control movement, pain, and normal sensation. Following a nerve block, you may notice some bruising at the site where the block was given. You may experience sensations such as: numbness of the affected area or limb, tingling, heaviness (that is the limb feels heavy to you), weakness or inability to move the affected arm or leg, or a feeling as if your arm or leg has “fallen asleep.”     A nerve block can last from 2 to 36 hours depending on the medications used.  Usually the weakness wears off first followed by the tingling and heaviness. As the block wears off, you may begin to notice pain; however, this sequence of events may occur in any order. Typically, you will be able to move your limb before you will feel it. Once a nerve block begins to wear off, the effects are usually completely gone within 60 minutes.  If you experience continued side effects that you believe are block related for longer than 48 hours, please call your healthcare provider. Please see block-specific instructions below.    Instructions for any block involving the shoulder or arm  If you have had any kind of shoulder/arm block, you will go home with your arm in a sling. Wear the sling until the block has completely worn off. You may be required to wear it for a longer period of time per your surgeon’s recommendations.  If you have had a shoulder/arm block, it is a good idea to sleep on a recliner with pillows under your arm.    You may experience symptoms such as:  Shortness of breath  Hoarseness   Blurry vision  Unequal pupils  Drooping of your face on the same side as the block was performed    These are side effects associated with this kind of block and should go away within 12 hours.    Note: If you have severe or prolonged shortness of breath, please seek medical assistance as soon as possible.      Protection of a “blocked” arm or leg (limb)  After a nerve block, you cannot feel pain, pressure, or extremes of temperature in the affected limb. And because of this, your blocked limb is at more risk for injury. For example, it is possible to burn your limb on an extremely hot surface without feeling it.     When resting, it is important to reposition your limb periodically to avoid prolonged pressure on it. This may require the use of pillows and padding.    While sleeping, you should avoid rolling onto the affected limb or putting too much pressure on it.     If you have a cast or tight dressing, check the color of your fingers or toes of the affected limb. Call your surgeon if they look discolored (that is, dusky, dark colored).    Use caution in cold weather. Cover your limb appropriately to protect it from the cold.      Pain Management:    Your surgeon will give you a prescription for pain medication. Begin taking this before the nerve block wears off. Bear in mind that sometimes the block can wear off in the middle of the night.        What to expect after a Nerve Block    Nerve blocks administered to block pain affect many types of nerves, including those nerves that control movement, pain, and normal sensation. Following a nerve block, you may notice some bruising at the site where the block was given. You may experience sensations such as: numbness of the affected area or limb, tingling, heaviness (that is the limb feels heavy to you), weakness or inability to move the affected arm or leg, or a feeling as if your arm or leg has “fallen asleep.”     A nerve block can last from 2 to 36 hours depending on the medications used.  Usually the weakness wears off first followed by the tingling and heaviness. As the block wears off, you may begin to notice pain; however, this sequence of events may occur in any order. Typically, you will be able to move your limb before you will feel it. Once a nerve block  begins to wear off, the effects are usually completely gone within 60 minutes.  If you experience continued side effects that you believe are block related for longer than 48 hours, please call your healthcare provider. Please see block-specific instructions below.    Instructions for any block involving the shoulder or arm  If you have had any kind of shoulder/arm block, you will go home with your arm in a sling. Wear the sling until the block has completely worn off. You may be required to wear it for a longer period of time per your surgeon’s recommendations.  If you have had a shoulder/arm block, it is a good idea to sleep on a recliner with pillows under your arm.    You may experience symptoms such as:  Shortness of breath  Hoarseness   Blurry vision  Unequal pupils  Drooping of your face on the same side as the block was performed    These are side effects associated with this kind of block and should go away within 12 hours.    Note: If you have severe or prolonged shortness of breath, please seek medical assistance as soon as possible.     Protection of a “blocked” arm or leg (limb)  After a nerve block, you cannot feel pain, pressure, or extremes of temperature in the affected limb. And because of this, your blocked limb is at more risk for injury. For example, it is possible to burn your limb on an extremely hot surface without feeling it.     When resting, it is important to reposition your limb periodically to avoid prolonged pressure on it. This may require the use of pillows and padding.    While sleeping, you should avoid rolling onto the affected limb or putting too much pressure on it.     If you have a cast or tight dressing, check the color of your fingers or toes of the affected limb. Call your surgeon if they look discolored (that is, dusky, dark colored).    Use caution in cold weather. Cover your limb appropriately to protect it from the cold.      Pain Management:    Your surgeon will give  you a prescription for pain medication. Begin taking this before the nerve block wears off. Bear in mind that sometimes the block can wear off in the middle of the night.

## 2023-01-12 ENCOUNTER — TREATMENT (OUTPATIENT)
Dept: PHYSICAL THERAPY | Facility: CLINIC | Age: 63
End: 2023-01-12
Payer: COMMERCIAL

## 2023-01-12 ENCOUNTER — TELEPHONE (OUTPATIENT)
Dept: ORTHOPEDIC SURGERY | Facility: HOSPITAL | Age: 63
End: 2023-01-12
Payer: COMMERCIAL

## 2023-01-12 DIAGNOSIS — R29.898 WEAKNESS OF RIGHT ARM: ICD-10-CM

## 2023-01-12 DIAGNOSIS — M25.611 DECREASED RIGHT SHOULDER RANGE OF MOTION: ICD-10-CM

## 2023-01-12 DIAGNOSIS — Z96.611 H/O TOTAL SHOULDER REPLACEMENT, RIGHT: Primary | ICD-10-CM

## 2023-01-12 PROCEDURE — 97110 THERAPEUTIC EXERCISES: CPT | Performed by: PHYSICAL THERAPIST

## 2023-01-12 PROCEDURE — 97161 PT EVAL LOW COMPLEX 20 MIN: CPT | Performed by: PHYSICAL THERAPIST

## 2023-01-12 PROCEDURE — 97140 MANUAL THERAPY 1/> REGIONS: CPT | Performed by: PHYSICAL THERAPIST

## 2023-01-12 NOTE — TELEPHONE ENCOUNTER
Post op day 2  Discharge Instructions:  Ask patient about his or her discharge instructions  ?  Patient confirmed understanding   ?  Further instruction needed   What, if any, recommendations, teaching, or interventions did you provide? Click or tap here to enter text.  Health status:  Pain controlled Yes   Taking the medication and it does help.   Recommended interventions:  Yes  incision/dressing status   ?  Clean without redness, drainage, odor  ?  Redness    ?  Drainage - color Click or tap here to enter text.  ?  Odor  CRISTINA - Green light blinking Choose an item.  Difficulties urination No  Last BM 1/12/2023 (if no BM by day 3-recommend OTC suppository or fleets enema)  Has had 1 BM taking the medication.   Medications:  ?Medications reviewed with patient/family/caregiver  Patient taking medications as prescribed?   Yes  If not taking medications as prescribed, note specific medicine(s) and reason for each:  Click or tap here to enter text.  Hospital Follow Up Plan:  Follow up Appointment with Orthopedic surgeon:  ?Has f/u appointment                ?Scheduled f/u appointment  Home Care ordered at discharge?    No        Home Care started, or contact made?    No   If no, action taken: Total Shoulder  DME obtained/used in home?         Choose an item.   Using IS  Yes   Other information:   Mr. Sandoval said he is doing ok. Still has some pain but nothing he can’t handle. Is taking the pain medication and it does help. He is doing the exercises and starts with OP PT today. Dressing remains CDI. He doesn’t have any questions/concerns for me at this time. Mr. Sandoval has my contact information should he need anything. He voiced understanding and appreciated the call.

## 2023-01-12 NOTE — PROGRESS NOTES
Physical Therapy Initial Evaluation and Plan of Care      Patient: Collin Sandoval   : 1960  Diagnosis/ICD-10 Code:  H/O total shoulder replacement, right [Z96.611]  Referring practitioner: Zane Moe MD  Date of Initial Visit: 2023  Today's Date: 2023  Patient seen for 1 sessions         Visit Diagnoses:    ICD-10-CM ICD-9-CM   1. H/O total shoulder replacement, right  Z96.611 V43.61   2. Decreased right shoulder range of motion  M25.611 719.51   3. Weakness of right arm  R29.898 729.89       Subjective Evaluation    History of Present Illness  Date of surgery: 1/10/2023  Mechanism of injury: Tim is s/p 1-10-23 R TSA, same day discharge.  There was no specific injury, wear and tear overtime.  He is accompanied by his wife, Yas. He has not had a lot of pain until today.  He is sleeping in bed, propped up at 45 degree angle, pillows surrounded him and under his shoulder for support.  He comes to therapy wearing his sling.    Return to doctor 23 for a follow up    He plays [employer].    He is R handed.       Patient Occupation:  at [employer]  Current pain ratin  At worst pain ratin  Quality: throbbing  Relieving factors: ice and medications    Social Support  Lives in: multiple-level home  Lives with: spouse    Hand dominance: right    Treatments  Previous treatment: injection treatment (2x, not a lot of help with the injections)  Patient Goals  Patient goals for therapy: decreased pain, increased motion, decreased edema and increased strength             Objective          Tenderness     Additional Tenderness Details  Around the incision site, anterior shoulder      No radicular symptoms noted    Forwards flexed posture - rounded shoulders, R arm in sling    Passive Range of Motion     Right Shoulder   Flexion: 90 degrees     Strength/Myotome Testing     Additional Strength Details  Not tested due to surgical precautions    Elbow, wrist, fingers R AROM WFL  all directions          Assessment & Plan     Assessment  Impairments: abnormal muscle firing, abnormal or restricted ROM, activity intolerance, impaired physical strength, lacks appropriate home exercise program, pain with function and safety issue  Functional Limitations: carrying objects, lifting, sleeping, pulling, pushing, uncomfortable because of pain, reaching behind back, reaching overhead and unable to perform repetitive tasks  Assessment details: Pt presents with limitations, noted below, that impede his ability to open a jar, perform ADLs, washing his back, household duties, recreational activities, sleeping.. The skills of a therapist will be required to safely and effectively implement the following treatment plan to restore maximal level of function.        Goals  Plan Goals: SHOULDER  PROBLEMS:     1. The patient has limited ROM of the right shoulder.    LTG 1: 12 weeks:  The patient will demonstrate 160 degrees of right shoulder flexion, 160 degrees of shoulder abduction, 60 degrees of shoulder external rotation, and 60 degrees of shoulder internal rotation to allow the patient to reach into upper kitchen cabinets and manipulate clothing behind the back with greater ease.    STATUS:  New   STG 1a: 4 weeks:  The patient will demonstrate 120 degrees of right shoulder flexion, 120 degrees of shoulder abduction, 45 degrees of shoulder external rotation, and 45 degrees of shoulder internal rotation.    STATUS:  New   TREATMENT: Manual therapy, therapeutic exercise, home exercise instruction, and modalities as needed to include: electrical stimulation, ultrasound, moist heat, and ice.    2. The patient has limited strength of the right shoulder.   LTG 2: 12 weeks:  The patient will demonstrate 4 /5 strength for right shoulder flexion, abduction, external rotation, and internal rotation in order to demonstrate improved shoulder stability.    STATUS:  New   STG 2a: 4 weeks:  The patient will demonstrate 4-  /5 strength for right shoulder flexion, abduction, external rotation, and internal rotation.    STATUS:  New   STG2b:  4 weeks:  The patient will be independent with home exercises.     STATUS:  New   TREATMENT: Manual therapy, therapeutic exercise, home exercise instruction, and modalities as needed to include: electrical stimulation, ultrasound, moist heat, and ice.     3. The patient complains of pain to the right shoulder.   LTG 3: 12 weeks:  The patient will report a pain rating of 1 /10 or better in order to improve sleep quality and tolerance to performance of activities of daily living.    STATUS:  New   STG 3a: 4 weeks:  The patient will report a pain rating of 3 /10 or better.     STATUS:  New   TREATMENT: Manual therapy, therapeutic exercise, home exercise instruction, and modalities as needed to include: electrical stimulation, ultrasound, moist heat, and ice.    4. Carrying, Moving, and Handling Objects Functional Limitation     LTG 4: 12 weeks:  The patient will demonstrate 20 % limitation by achieving a score of  on the QuickDASH.    STATUS:  New   STG 4a: 4 weeks:  The patient will demonstrate 40 % limitation by achieving a score of on the QuickDASH.      STATUS:  New   TREATMENT:  Manual therapy, therapeutic exercise, home exercise instruction, and modalities as needed to include: moist heat, electrical stimulation, and ultrasound.      Plan  Therapy options: will be seen for skilled therapy services  Planned modality interventions: cryotherapy, dry needling, electrical stimulation/Russian stimulation, TENS, thermotherapy (hydrocollator packs) and ultrasound  Planned therapy interventions: manual therapy, flexibility, functional ROM exercises, home exercise program, therapeutic activities, stretching, strengthening, soft tissue mobilization, postural training, neuromuscular re-education, body mechanics training, joint mobilization, ADL retraining and fine motor coordination training  Frequency: 2x  week  Duration in weeks: 12  Treatment plan discussed with: patient  Plan details: Review HEP, update as needed.    Progress with R shoulder ROM, decrease pain levels, improve scapular and shoulder strength, improve scapular stabilization, postural awareness, increased stamina, decreased tightness, improved ROM/flexibility, improve fine motor and coordination, education as needed.    Follow TSA protocol - Dr. Moe            History # of Personal Factors and/or Comorbidities: MODERATE (1-2)  Examination of Body System(s): # of elements: MODERATE (3)  Clinical Presentation: STABLE   Clinical Decision Making: LOW     Timed:  Manual Therapy:    10     mins  86486;  Therapeutic Exercise:    11     mins  99726;     Neuromuscular Chris:        mins  07534;    Therapeutic Activity:          mins  85862;     Gait Training:           mins  05391;     Ultrasound:          mins  61665;    Canalith Repos           ___  mins  80414      Untimed:  Electrical Stimulation:        mins  84475 ( );  Mechanical Traction:         mins  72768;   Dry Needling:                     mins self pay  Low Eval:                      25     mins  88320;  Medium Eval:                     mins  13660;   High Eval:                          mins  42093       Timed Treatment:   21   mins   Total Treatment:     46   mins    PT SIGNATURE: Janeth Irizarry PT, DPT  [place] License: [id number]  Electronically signed by Janeth Irizarry PT, 01/12/23, 12:56 PM EST      Initial Certification    Certification Period: 1/12/2023 thru 4/11/2023  I certify that the therapy services are furnished while this patient is under my care.  The services outlined above are required by this patient, and will be reviewed every 90 days.     PHYSICIAN: Zane Moe MD  NPI: [id number]            PHYSICIAN PRINT NAME: ______________________________________________      PHYSICIAN SIGNATURE:  ______________________________________________         DATE:________________________________        Please sign and return via fax to [phone].  Thank you, [facility].

## 2023-01-18 ENCOUNTER — TREATMENT (OUTPATIENT)
Dept: PHYSICAL THERAPY | Facility: CLINIC | Age: 63
End: 2023-01-18
Payer: COMMERCIAL

## 2023-01-18 DIAGNOSIS — M25.611 DECREASED RIGHT SHOULDER RANGE OF MOTION: ICD-10-CM

## 2023-01-18 DIAGNOSIS — Z96.611 H/O TOTAL SHOULDER REPLACEMENT, RIGHT: Primary | ICD-10-CM

## 2023-01-18 DIAGNOSIS — R29.898 WEAKNESS OF RIGHT ARM: ICD-10-CM

## 2023-01-18 PROCEDURE — 97140 MANUAL THERAPY 1/> REGIONS: CPT | Performed by: PHYSICAL THERAPIST

## 2023-01-18 PROCEDURE — 97110 THERAPEUTIC EXERCISES: CPT | Performed by: PHYSICAL THERAPIST

## 2023-01-18 PROCEDURE — 97014 ELECTRIC STIMULATION THERAPY: CPT | Performed by: PHYSICAL THERAPIST

## 2023-01-18 NOTE — PROGRESS NOTES
Physical Therapy Daily Treatment Note      Patient: Collin Sandoval   : 1960  Referring practitioner: Zane Moe MD  Date of Initial Visit: Type: THERAPY  Noted: 2023  Today's Date: 2023  Patient seen for 2 sessions           Visit Diagnoses:    ICD-10-CM ICD-9-CM   1. H/O total shoulder replacement, right  Z96.611 V43.61   2. Decreased right shoulder range of motion  M25.611 719.51   3. Weakness of right arm  R29.898 729.89       Subjective Evaluation    History of Present Illness    Subjective comment: 2/10 pain in the R shoulder, taking pain medication as needed.  Last night he got a shooting pain in the arm, but for the most part, he is doing ok.           Objective   See Exercise, Manual, and Modality Logs for complete treatment.       Assessment & Plan     Assessment  Impairments: abnormal muscle firing, abnormal or restricted ROM, activity intolerance, impaired physical strength, lacks appropriate home exercise program, pain with function and safety issue  Functional Limitations: carrying objects, lifting, sleeping, pulling, pushing, uncomfortable because of pain, reaching behind back, reaching overhead and unable to perform repetitive tasks  Assessment details: Tim is progressing within limits, PROM 90 degrees pain-free.  Today was his first treatment visit, able to tolerate light ROM per protocol limits.  Reviewed HEP.  Modalities used at end of session to help with decreasing pain levels.      Goals  Plan Goals: SHOULDER  PROBLEMS:     1. The patient has limited ROM of the right shoulder.    LTG 1: 12 weeks:  The patient will demonstrate 160 degrees of right shoulder flexion, 160 degrees of shoulder abduction, 60 degrees of shoulder external rotation, and 60 degrees of shoulder internal rotation to allow the patient to reach into upper kitchen cabinets and manipulate clothing behind the back with greater ease.    STATUS:  Progressing   STG 1a: 4 weeks:  The patient will demonstrate  120 degrees of right shoulder flexion, 120 degrees of shoulder abduction, 45 degrees of shoulder external rotation, and 45 degrees of shoulder internal rotation.    STATUS:  Progressing   TREATMENT: Manual therapy, therapeutic exercise, home exercise instruction, and modalities as needed to include: electrical stimulation, ultrasound, moist heat, and ice.    2. The patient has limited strength of the right shoulder.   LTG 2: 12 weeks:  The patient will demonstrate 4 /5 strength for right shoulder flexion, abduction, external rotation, and internal rotation in order to demonstrate improved shoulder stability.    STATUS:  Progressing   STG 2a: 4 weeks:  The patient will demonstrate 4- /5 strength for right shoulder flexion, abduction, external rotation, and internal rotation.    STATUS:  Progressing   STG2b:  4 weeks:  The patient will be independent with home exercises.     STATUS:  Progressing   TREATMENT: Manual therapy, therapeutic exercise, home exercise instruction, and modalities as needed to include: electrical stimulation, ultrasound, moist heat, and ice.     3. The patient complains of pain to the right shoulder.   LTG 3: 12 weeks:  The patient will report a pain rating of 1 /10 or better in order to improve sleep quality and tolerance to performance of activities of daily living.    STATUS:  Progressing   STG 3a: 4 weeks:  The patient will report a pain rating of 3 /10 or better.     STATUS:  Progressing   TREATMENT: Manual therapy, therapeutic exercise, home exercise instruction, and modalities as needed to include: electrical stimulation, ultrasound, moist heat, and ice.    4. Carrying, Moving, and Handling Objects Functional Limitation     LTG 4: 12 weeks:  The patient will demonstrate 20 % limitation by achieving a score of  on the QuickDASH.    STATUS:  Progressing   STG 4a: 4 weeks:  The patient will demonstrate 40 % limitation by achieving a score of on the QuickDASH.      STATUS:   Progressing   TREATMENT:  Manual therapy, therapeutic exercise, home exercise instruction, and modalities as needed to include: moist heat, electrical stimulation, and ultrasound.      Plan  Therapy options: will be seen for skilled therapy services  Planned modality interventions: cryotherapy, dry needling, electrical stimulation/Russian stimulation, TENS, thermotherapy (hydrocollator packs) and ultrasound  Planned therapy interventions: manual therapy, flexibility, functional ROM exercises, home exercise program, therapeutic activities, stretching, strengthening, soft tissue mobilization, postural training, neuromuscular re-education, body mechanics training, joint mobilization, ADL retraining and fine motor coordination training  Frequency: 2x week  Duration in weeks: 12  Treatment plan discussed with: patient  Plan details: Progress with R shoulder ROM, decrease pain levels, improve scapular and shoulder strength, improve scapular stabilization, postural awareness.    Follow TSA protocol - Dr. Moe            Progress per Plan of Care and Progress strengthening /stabilization /functional activity           Timed:  Manual Therapy:    18     mins  12548;  Therapeutic Exercise:    8     mins  42763;     Neuromuscular Chris:        mins  42265;    Therapeutic Activity:          mins  57399;     Gait Training:           mins  32315;     Ultrasound:          mins  99501;    Canalith Repos           ___  mins  91555      Untimed:  Electrical Stimulation:    15     mins  04518 ( );  Mechanical Traction:         mins  24960;   Dry Needling:                     mins self pay       Timed Treatment:   26   mins   Total Treatment:     41  mins      Janeth Irizarry PT, DPT  [place] License #: [id number]

## 2023-01-20 ENCOUNTER — TREATMENT (OUTPATIENT)
Dept: PHYSICAL THERAPY | Facility: CLINIC | Age: 63
End: 2023-01-20
Payer: COMMERCIAL

## 2023-01-20 DIAGNOSIS — M25.611 DECREASED RIGHT SHOULDER RANGE OF MOTION: ICD-10-CM

## 2023-01-20 DIAGNOSIS — Z96.611 H/O TOTAL SHOULDER REPLACEMENT, RIGHT: Primary | ICD-10-CM

## 2023-01-20 DIAGNOSIS — R29.898 WEAKNESS OF RIGHT ARM: ICD-10-CM

## 2023-01-20 PROCEDURE — 97140 MANUAL THERAPY 1/> REGIONS: CPT | Performed by: PHYSICAL THERAPIST

## 2023-01-20 PROCEDURE — 97110 THERAPEUTIC EXERCISES: CPT | Performed by: PHYSICAL THERAPIST

## 2023-01-20 NOTE — PROGRESS NOTES
Physical Therapy Daily Treatment Note      Patient: Collin Sandoval   : 1960  Referring practitioner: Zane Moe MD  Date of Initial Visit: Type: THERAPY  Noted: 2023  Today's Date: 2023  Patient seen for 3 sessions           Visit Diagnoses:    ICD-10-CM ICD-9-CM   1. H/O total shoulder replacement, right  Z96.611 V43.61   2. Decreased right shoulder range of motion  M25.611 719.51   3. Weakness of right arm  R29.898 729.89       Subjective Evaluation    History of Present Illness    Subjective comment: 0/10 pain in the shoulder, no pain medication needed today         Objective   See Exercise, Manual, and Modality Logs for complete treatment.       Assessment & Plan     Assessment  Impairments: abnormal muscle firing, abnormal or restricted ROM, activity intolerance, impaired physical strength, lacks appropriate home exercise program, pain with function and safety issue  Functional Limitations: carrying objects, lifting, sleeping, pulling, pushing, uncomfortable because of pain, reaching behind back, reaching overhead and unable to perform repetitive tasks  Assessment details: Tim is progressing within limits for ROM and able to tolerate the light exercise, continue with his HEP.  He is going to the doctor on Wednesday for a follow up.  No pain today, no modalities were used.       Goals  Plan Goals: SHOULDER  PROBLEMS:     1. The patient has limited ROM of the right shoulder.    LTG 1: 12 weeks:  The patient will demonstrate 160 degrees of right shoulder flexion, 160 degrees of shoulder abduction, 60 degrees of shoulder external rotation, and 60 degrees of shoulder internal rotation to allow the patient to reach into upper kitchen cabinets and manipulate clothing behind the back with greater ease.    STATUS:  Progressing   STG 1a: 4 weeks:  The patient will demonstrate 120 degrees of right shoulder flexion, 120 degrees of shoulder abduction, 45 degrees of shoulder external rotation, and 45  degrees of shoulder internal rotation.    STATUS:  Progressing   TREATMENT: Manual therapy, therapeutic exercise, home exercise instruction, and modalities as needed to include: electrical stimulation, ultrasound, moist heat, and ice.    2. The patient has limited strength of the right shoulder.   LTG 2: 12 weeks:  The patient will demonstrate 4 /5 strength for right shoulder flexion, abduction, external rotation, and internal rotation in order to demonstrate improved shoulder stability.    STATUS:  Progressing   STG 2a: 4 weeks:  The patient will demonstrate 4- /5 strength for right shoulder flexion, abduction, external rotation, and internal rotation.    STATUS:  Progressing   STG2b:  4 weeks:  The patient will be independent with home exercises.     STATUS:  Progressing   TREATMENT: Manual therapy, therapeutic exercise, home exercise instruction, and modalities as needed to include: electrical stimulation, ultrasound, moist heat, and ice.     3. The patient complains of pain to the right shoulder.   LTG 3: 12 weeks:  The patient will report a pain rating of 1 /10 or better in order to improve sleep quality and tolerance to performance of activities of daily living.    STATUS:  Progressing   STG 3a: 4 weeks:  The patient will report a pain rating of 3 /10 or better.     STATUS:  Progressing   TREATMENT: Manual therapy, therapeutic exercise, home exercise instruction, and modalities as needed to include: electrical stimulation, ultrasound, moist heat, and ice.    4. Carrying, Moving, and Handling Objects Functional Limitation     LTG 4: 12 weeks:  The patient will demonstrate 20 % limitation by achieving a score of  on the QuickDASH.    STATUS:  Progressing   STG 4a: 4 weeks:  The patient will demonstrate 40 % limitation by achieving a score of on the QuickDASH.      STATUS:  Progressing   TREATMENT:  Manual therapy, therapeutic exercise, home exercise instruction, and modalities as needed to include: moist heat,  electrical stimulation, and ultrasound.      Plan  Therapy options: will be seen for skilled therapy services  Planned modality interventions: cryotherapy, dry needling, electrical stimulation/Russian stimulation, TENS, thermotherapy (hydrocollator packs) and ultrasound  Planned therapy interventions: manual therapy, flexibility, functional ROM exercises, home exercise program, therapeutic activities, stretching, strengthening, soft tissue mobilization, postural training, neuromuscular re-education, body mechanics training, joint mobilization, ADL retraining and fine motor coordination training  Frequency: 2x week  Duration in weeks: 12  Treatment plan discussed with: patient  Plan details: Continue along protocol for R shoulder ROM, improve scapular and shoulder strength, improve scapular stabilization, postural awareness.    Follow TSA protocol - Dr. Moe            Progress per Plan of Care and Progress strengthening /stabilization /functional activity           Timed:  Manual Therapy:    14     mins  25350;  Therapeutic Exercise:    8    mins  88192;     Neuromuscular Chris:        mins  25896;    Therapeutic Activity:          mins  56615;     Gait Training:           mins  43705;     Ultrasound:          mins  51834;    Canalith Repos           ___  mins  30251      Untimed:  Electrical Stimulation:         mins  81828 ( );  Mechanical Traction:         mins  13796;   Dry Needling:                     mins self pay       Timed Treatment:   22   mins   Total Treatment:     26   mins      Janeth Irizarry PT, DPT  [place] License #: [id number]

## 2023-01-22 DIAGNOSIS — E11.65 TYPE 2 DIABETES MELLITUS WITH HYPERGLYCEMIA, WITHOUT LONG-TERM CURRENT USE OF INSULIN: ICD-10-CM

## 2023-01-23 ENCOUNTER — TELEPHONE (OUTPATIENT)
Dept: ORTHOPEDIC SURGERY | Facility: HOSPITAL | Age: 63
End: 2023-01-23
Payer: COMMERCIAL

## 2023-01-23 RX ORDER — LISINOPRIL 10 MG/1
10 TABLET ORAL DAILY
Qty: 90 TABLET | Refills: 1 | Status: SHIPPED | OUTPATIENT
Start: 2023-01-23

## 2023-01-23 RX ORDER — GLIPIZIDE 10 MG/1
TABLET, FILM COATED, EXTENDED RELEASE ORAL
Qty: 180 TABLET | Refills: 0 | Status: SHIPPED | OUTPATIENT
Start: 2023-01-23

## 2023-01-23 RX ORDER — ATORVASTATIN CALCIUM 20 MG/1
20 TABLET, FILM COATED ORAL DAILY
Qty: 90 TABLET | Refills: 0 | Status: SHIPPED | OUTPATIENT
Start: 2023-01-23

## 2023-01-23 RX ORDER — CITALOPRAM 20 MG/1
20 TABLET ORAL DAILY
Qty: 90 TABLET | Refills: 0 | Status: SHIPPED | OUTPATIENT
Start: 2023-01-23 | End: 2023-02-03

## 2023-01-23 NOTE — TELEPHONE ENCOUNTER
Called and spoke with Mr. Sandoval at this time to see how he is doing as he is 2 weeks SP RTS. He said things are going great. He is working with OP PT and doing well. He has a F/U appt with MD office Wednesday. Dressing looks good. BM's are normal. Pain is controlled. Mr. Sandoval doesn't have any questions/concerns for me at this time. He has my contact information should he need anything. He voiced understanding and appreciated the call.

## 2023-01-25 ENCOUNTER — OFFICE VISIT (OUTPATIENT)
Dept: ORTHOPEDIC SURGERY | Facility: CLINIC | Age: 63
End: 2023-01-25
Payer: COMMERCIAL

## 2023-01-25 VITALS — WEIGHT: 280 LBS | BODY MASS INDEX: 34.82 KG/M2 | HEIGHT: 75 IN | TEMPERATURE: 97.3 F

## 2023-01-25 DIAGNOSIS — M19.019 ARTHRITIS OF SHOULDER: Primary | ICD-10-CM

## 2023-01-25 DIAGNOSIS — Z09 SURGERY FOLLOW-UP: ICD-10-CM

## 2023-01-25 PROCEDURE — 99024 POSTOP FOLLOW-UP VISIT: CPT | Performed by: ORTHOPAEDIC SURGERY

## 2023-01-25 PROCEDURE — 73030 X-RAY EXAM OF SHOULDER: CPT | Performed by: ORTHOPAEDIC SURGERY

## 2023-01-25 RX ORDER — HYDROCODONE BITARTRATE AND ACETAMINOPHEN 7.5; 325 MG/1; MG/1
1 TABLET ORAL EVERY 4 HOURS PRN
Qty: 42 TABLET | Refills: 0 | Status: SHIPPED | OUTPATIENT
Start: 2023-01-25 | End: 2023-02-22 | Stop reason: SDUPTHER

## 2023-01-25 NOTE — PROGRESS NOTES
"Collin Sandoval : 1960 MRN: [id number] DATE: 2023    DIAGNOSIS:  2 week follow up right shoulder arthroplasty      SUBJECTIVE:  Patient returns today for 2 week follow up of right shoulder replacement. Patient reports doing well with no unusual complaints.      OBJECTIVE:    Temp 97.3 °F (36.3 °C)   Ht 190.5 cm (75\")   Wt 127 kg (280 lb)   BMI 35.00 kg/m²     Exam:  The incision is well approximated.  No erythema or drainage. Shoulder moves fluidly with pendulums.  The arm is soft and nontender.  Intact motor and sensory function in the lower arm and hand.  Palpable radial pulse.    DIAGNOSTIC STUDIES    Xrays: AP and scapular Y views of the right shoulder are ordered and reviewed for evaluation of the recent shoulder replacement.  The x-rays demonstrate a well positioned, well aligned replacement without complicating factors noted.  In comparison with previous films, there has been no change.    ASSESSMENT: 2 week follow up right shoulder replacement.    PLAN:   1.  Begin PT per protocol--prescription given along with 2 copies of my protocol.  2.  Continue sling until next visit.  3.  Counseled patient about appropriate activity modifications and restrictions, including no driving at this point.    Zane Moe MD    "

## 2023-01-26 ENCOUNTER — TELEPHONE (OUTPATIENT)
Dept: ORTHOPEDIC SURGERY | Facility: CLINIC | Age: 63
End: 2023-01-26
Payer: COMMERCIAL

## 2023-01-26 ENCOUNTER — TREATMENT (OUTPATIENT)
Dept: PHYSICAL THERAPY | Facility: CLINIC | Age: 63
End: 2023-01-26
Payer: COMMERCIAL

## 2023-01-26 DIAGNOSIS — R29.898 WEAKNESS OF RIGHT ARM: ICD-10-CM

## 2023-01-26 DIAGNOSIS — M25.611 DECREASED RIGHT SHOULDER RANGE OF MOTION: ICD-10-CM

## 2023-01-26 DIAGNOSIS — Z96.611 H/O TOTAL SHOULDER REPLACEMENT, RIGHT: Primary | ICD-10-CM

## 2023-01-26 PROCEDURE — 97140 MANUAL THERAPY 1/> REGIONS: CPT | Performed by: PHYSICAL THERAPIST

## 2023-01-26 PROCEDURE — 97110 THERAPEUTIC EXERCISES: CPT | Performed by: PHYSICAL THERAPIST

## 2023-01-26 NOTE — PROGRESS NOTES
Physical Therapy Daily Treatment Note      Patient: Collin Sandoval   : 1960  Referring practitioner: Zane Moe MD  Date of Initial Visit: Type: THERAPY  Noted: 2023  Today's Date: 2023  Patient seen for 3 sessions           Subjective Evaluation    History of Present Illness    Subjective comment: 0/10       Objective   See Exercise, Manual, and Modality Logs for complete treatment.       Assessment & Plan     Assessment  Impairments: abnormal muscle firing, abnormal or restricted ROM, activity intolerance, impaired physical strength, lacks appropriate home exercise program, pain with function and safety issue  Functional Limitations: carrying objects, lifting, sleeping, pulling, pushing, uncomfortable because of pain, reaching behind back, reaching overhead and unable to perform repetitive tasks  Assessment details: Pt is reporting pain and has not taking meds in 4 or 5 days. There is excellent ROM in the motion of flexion. IR/and ER con to be quite restricted. Cuing was required for correct performance of exercises due to over compensation.      Goals  Plan Goals: SHOULDER  PROBLEMS:     1. The patient has limited ROM of the right shoulder.    LTG 1: 12 weeks:  The patient will demonstrate 160 degrees of right shoulder flexion, 160 degrees of shoulder abduction, 60 degrees of shoulder external rotation, and 60 degrees of shoulder internal rotation to allow the patient to reach into upper kitchen cabinets and manipulate clothing behind the back with greater ease.    STATUS:  Progressing   STG 1a: 4 weeks:  The patient will demonstrate 120 degrees of right shoulder flexion, 120 degrees of shoulder abduction, 45 degrees of shoulder external rotation, and 45 degrees of shoulder internal rotation.    STATUS:  Progressing   TREATMENT: Manual therapy, therapeutic exercise, home exercise instruction, and modalities as needed to include: electrical stimulation, ultrasound, moist heat, and ice.    2.  The patient has limited strength of the right shoulder.   LTG 2: 12 weeks:  The patient will demonstrate 4 /5 strength for right shoulder flexion, abduction, external rotation, and internal rotation in order to demonstrate improved shoulder stability.    STATUS:  Progressing   STG 2a: 4 weeks:  The patient will demonstrate 4- /5 strength for right shoulder flexion, abduction, external rotation, and internal rotation.    STATUS:  Progressing   STG2b:  4 weeks:  The patient will be independent with home exercises.     STATUS:  Progressing   TREATMENT: Manual therapy, therapeutic exercise, home exercise instruction, and modalities as needed to include: electrical stimulation, ultrasound, moist heat, and ice.     3. The patient complains of pain to the right shoulder.   LTG 3: 12 weeks:  The patient will report a pain rating of 1 /10 or better in order to improve sleep quality and tolerance to performance of activities of daily living.    STATUS:  Progressing   STG 3a: 4 weeks:  The patient will report a pain rating of 3 /10 or better.     STATUS:  Progressing   TREATMENT: Manual therapy, therapeutic exercise, home exercise instruction, and modalities as needed to include: electrical stimulation, ultrasound, moist heat, and ice.    4. Carrying, Moving, and Handling Objects Functional Limitation     LTG 4: 12 weeks:  The patient will demonstrate 20 % limitation by achieving a score of  on the QuickDASH.    STATUS:  Progressing   STG 4a: 4 weeks:  The patient will demonstrate 40 % limitation by achieving a score of on the QuickDASH.      STATUS:  Progressing   TREATMENT:  Manual therapy, therapeutic exercise, home exercise instruction, and modalities as needed to include: moist heat, electrical stimulation, and ultrasound.      Plan  Therapy options: will be seen for skilled therapy services  Planned modality interventions: cryotherapy, dry needling, electrical stimulation/Russian stimulation, TENS, thermotherapy  (hydrocollator packs) and ultrasound  Planned therapy interventions: manual therapy, flexibility, functional ROM exercises, home exercise program, therapeutic activities, stretching, strengthening, soft tissue mobilization, postural training, neuromuscular re-education, body mechanics training, joint mobilization, ADL retraining and fine motor coordination training  Frequency: 2x week  Duration in weeks: 12  Treatment plan discussed with: patient  Plan details: Cont with protocol restrictions. Work toward greater ROM and muscle activation as allowed by protocol.            Visit Diagnoses:    ICD-10-CM ICD-9-CM   1. H/O total shoulder replacement, right  Z96.611 V43.61   2. Decreased right shoulder range of motion  M25.611 719.51   3. Weakness of right arm  R29.898 729.89       Progress per Plan of Care    Timed:    Therapeutic Exercise:    11     mins  46615;  Manual Therapy:    13     mins  96918;     Neuromuscular Chris:       mins  18620;    Therapeutic Activity:          mins  67175;     Gait Training:           mins  58982;     Ultrasound:          mins  77922;      Untimed:  Electrical Stimulation:         mins  30643 ( );  Mechanical Traction:         mins  66036;     Timed Treatment:   24   mins   Total Treatment:     26   mins      Lulu Boyle PTA  Physical Therapist Assistant

## 2023-01-27 ENCOUNTER — TREATMENT (OUTPATIENT)
Dept: PHYSICAL THERAPY | Facility: CLINIC | Age: 63
End: 2023-01-27
Payer: COMMERCIAL

## 2023-01-27 DIAGNOSIS — R29.898 WEAKNESS OF RIGHT ARM: ICD-10-CM

## 2023-01-27 DIAGNOSIS — M25.611 DECREASED RIGHT SHOULDER RANGE OF MOTION: ICD-10-CM

## 2023-01-27 DIAGNOSIS — Z96.611 H/O TOTAL SHOULDER REPLACEMENT, RIGHT: Primary | ICD-10-CM

## 2023-01-27 PROCEDURE — 97110 THERAPEUTIC EXERCISES: CPT | Performed by: PHYSICAL THERAPIST

## 2023-01-27 PROCEDURE — 97140 MANUAL THERAPY 1/> REGIONS: CPT | Performed by: PHYSICAL THERAPIST

## 2023-01-27 NOTE — PROGRESS NOTES
Physical Therapy Daily Treatment Note      Patient: Collin Sandoval   : 1960  Referring practitioner: Zane Moe MD  Date of Initial Visit: Type: THERAPY  Noted: 2023  Today's Date: 2023  Patient seen for 4 sessions           Subjective Evaluation    History of Present Illness    Subjective comment: 0/10       Objective   See Exercise, Manual, and Modality Logs for complete treatment.       Assessment & Plan     Assessment  Impairments: abnormal muscle firing, abnormal or restricted ROM, activity intolerance, impaired physical strength, lacks appropriate home exercise program, pain with function and safety issue  Functional Limitations: carrying objects, lifting, sleeping, pulling, pushing, uncomfortable because of pain, reaching behind back, reaching overhead and unable to perform repetitive tasks  Assessment details: Pt reports not doing his HEP today but otherwise is doing it. Rotational movements cont to be very limited but the flexion and ABD are looking very good. Pt was advised on wall and table slides at this visit. Overall pt tolerated all tx without issue.    Goals  Plan Goals: SHOULDER  PROBLEMS:     1. The patient has limited ROM of the right shoulder.    LTG 1: 12 weeks:  The patient will demonstrate 160 degrees of right shoulder flexion, 160 degrees of shoulder abduction, 60 degrees of shoulder external rotation, and 60 degrees of shoulder internal rotation to allow the patient to reach into upper kitchen cabinets and manipulate clothing behind the back with greater ease.    STATUS:  Progressing   STG 1a: 4 weeks:  The patient will demonstrate 120 degrees of right shoulder flexion, 120 degrees of shoulder abduction, 45 degrees of shoulder external rotation, and 45 degrees of shoulder internal rotation.    STATUS:  Progressing   TREATMENT: Manual therapy, therapeutic exercise, home exercise instruction, and modalities as needed to include: electrical stimulation, ultrasound, moist  heat, and ice.    2. The patient has limited strength of the right shoulder.   LTG 2: 12 weeks:  The patient will demonstrate 4 /5 strength for right shoulder flexion, abduction, external rotation, and internal rotation in order to demonstrate improved shoulder stability.    STATUS:  Progressing   STG 2a: 4 weeks:  The patient will demonstrate 4- /5 strength for right shoulder flexion, abduction, external rotation, and internal rotation.    STATUS:  Progressing   STG2b:  4 weeks:  The patient will be independent with home exercises.     STATUS:  Progressing   TREATMENT: Manual therapy, therapeutic exercise, home exercise instruction, and modalities as needed to include: electrical stimulation, ultrasound, moist heat, and ice.     3. The patient complains of pain to the right shoulder.   LTG 3: 12 weeks:  The patient will report a pain rating of 1 /10 or better in order to improve sleep quality and tolerance to performance of activities of daily living.    STATUS:  Progressing   STG 3a: 4 weeks:  The patient will report a pain rating of 3 /10 or better.     STATUS:  Progressing   TREATMENT: Manual therapy, therapeutic exercise, home exercise instruction, and modalities as needed to include: electrical stimulation, ultrasound, moist heat, and ice.    4. Carrying, Moving, and Handling Objects Functional Limitation     LTG 4: 12 weeks:  The patient will demonstrate 20 % limitation by achieving a score of  on the QuickDASH.    STATUS:  Progressing   STG 4a: 4 weeks:  The patient will demonstrate 40 % limitation by achieving a score of on the QuickDASH.      STATUS:  Progressing   TREATMENT:  Manual therapy, therapeutic exercise, home exercise instruction, and modalities as needed to include: moist heat, electrical stimulation, and ultrasound.      Plan  Therapy options: will be seen for skilled therapy services  Planned modality interventions: cryotherapy, dry needling, electrical stimulation/Russian stimulation, TENS,  thermotherapy (hydrocollator packs) and ultrasound  Planned therapy interventions: manual therapy, flexibility, functional ROM exercises, home exercise program, therapeutic activities, stretching, strengthening, soft tissue mobilization, postural training, neuromuscular re-education, body mechanics training, joint mobilization, ADL retraining and fine motor coordination training  Frequency: 2x week  Duration in weeks: 12  Treatment plan discussed with: patient  Plan details: Cont with protocol restrictions. Work toward greater ROM and muscle activation as allowed by protocol.            Visit Diagnoses:    ICD-10-CM ICD-9-CM   1. H/O total shoulder replacement, right  Z96.611 V43.61   2. Decreased right shoulder range of motion  M25.611 719.51   3. Weakness of right arm  R29.898 729.89       Progress per Plan of Care    Timed:    Therapeutic Exercise:    12     mins  03737;  Manual Therapy:    12     mins  53206;     Neuromuscular Chris:       mins  23317;    Therapeutic Activity:          mins  15821;     Gait Training:           mins  97383;     Ultrasound:          mins  91017;      Untimed:  Electrical Stimulation:         mins  32952 ( );  Mechanical Traction:         mins  99909;     Timed Treatment:      mins   Total Treatment:        mins      Lulu Boyle PTA  Physical Therapist Assistant

## 2023-01-30 ENCOUNTER — TELEPHONE (OUTPATIENT)
Dept: ORTHOPEDIC SURGERY | Facility: CLINIC | Age: 63
End: 2023-01-30

## 2023-01-30 NOTE — TELEPHONE ENCOUNTER
Spoke with patient and faxed medical update to [facility].  Patient provided claim number and fax number.

## 2023-01-30 NOTE — TELEPHONE ENCOUNTER
Caller: KAREN    Relationship to patient:  [facility]    Best call back number: [phone]    Patient is needing: KAREN AT [facility] WANTS TO KNOW IF THE PATIENT HAD SURGERY FOR RIGHT SHOULDER ARTHROPLASTY. PLEASE CALL .

## 2023-02-01 ENCOUNTER — TREATMENT (OUTPATIENT)
Dept: PHYSICAL THERAPY | Facility: CLINIC | Age: 63
End: 2023-02-01
Payer: COMMERCIAL

## 2023-02-01 DIAGNOSIS — M25.611 DECREASED RIGHT SHOULDER RANGE OF MOTION: ICD-10-CM

## 2023-02-01 DIAGNOSIS — R29.898 WEAKNESS OF RIGHT ARM: ICD-10-CM

## 2023-02-01 DIAGNOSIS — Z96.611 H/O TOTAL SHOULDER REPLACEMENT, RIGHT: Primary | ICD-10-CM

## 2023-02-01 PROCEDURE — 97110 THERAPEUTIC EXERCISES: CPT | Performed by: PHYSICAL THERAPIST

## 2023-02-01 PROCEDURE — 97140 MANUAL THERAPY 1/> REGIONS: CPT | Performed by: PHYSICAL THERAPIST

## 2023-02-02 ENCOUNTER — OFFICE VISIT (OUTPATIENT)
Dept: FAMILY MEDICINE CLINIC | Age: 63
End: 2023-02-02
Payer: COMMERCIAL

## 2023-02-02 VITALS
HEIGHT: 75 IN | SYSTOLIC BLOOD PRESSURE: 140 MMHG | DIASTOLIC BLOOD PRESSURE: 80 MMHG | BODY MASS INDEX: 35 KG/M2 | HEART RATE: 88 BPM

## 2023-02-02 DIAGNOSIS — E78.5 HYPERLIPIDEMIA, UNSPECIFIED HYPERLIPIDEMIA TYPE: ICD-10-CM

## 2023-02-02 DIAGNOSIS — Z79.899 HIGH RISK MEDICATION USE: ICD-10-CM

## 2023-02-02 DIAGNOSIS — E11.65 TYPE 2 DIABETES MELLITUS WITH HYPERGLYCEMIA, WITHOUT LONG-TERM CURRENT USE OF INSULIN: ICD-10-CM

## 2023-02-02 DIAGNOSIS — F41.9 ANXIETY: Primary | ICD-10-CM

## 2023-02-02 DIAGNOSIS — F91.9 BEHAVIOR DISTURBANCE: ICD-10-CM

## 2023-02-02 DIAGNOSIS — G62.9 PERIPHERAL POLYNEUROPATHY: ICD-10-CM

## 2023-02-02 DIAGNOSIS — I10 PRIMARY HYPERTENSION: ICD-10-CM

## 2023-02-02 LAB
AMPHET+METHAMPHET UR QL: NEGATIVE
AMPHETAMINES UR QL: NEGATIVE
BARBITURATES UR QL SCN: NEGATIVE
BENZODIAZ UR QL SCN: NEGATIVE
BUPRENORPHINE SERPL-MCNC: NEGATIVE NG/ML
CANNABINOIDS SERPL QL: NEGATIVE
COCAINE UR QL: NEGATIVE
EXPIRATION DATE: NORMAL
Lab: NORMAL
MDMA UR QL SCN: NEGATIVE
METHADONE UR QL SCN: NEGATIVE
OPIATES UR QL: NEGATIVE
OXYCODONE UR QL SCN: NEGATIVE
PCP UR QL SCN: NEGATIVE
THC INTERNAL CONTROL: NORMAL

## 2023-02-02 PROCEDURE — 80305 DRUG TEST PRSMV DIR OPT OBS: CPT | Performed by: NURSE PRACTITIONER

## 2023-02-02 PROCEDURE — 99214 OFFICE O/P EST MOD 30 MIN: CPT | Performed by: NURSE PRACTITIONER

## 2023-02-02 RX ORDER — PREGABALIN 25 MG/1
25 CAPSULE ORAL 2 TIMES DAILY
Qty: 180 CAPSULE | Refills: 0 | Status: SHIPPED | OUTPATIENT
Start: 2023-02-02

## 2023-02-02 NOTE — ASSESSMENT & PLAN NOTE
-he was advised to continue lipitor as he is diabetic, and would fear his cholesterol levels would increase if he were to stop med  -due for lipid panel in March, order in chart

## 2023-02-02 NOTE — ASSESSMENT & PLAN NOTE
-last A1c 7.2, improved  -out of metformin, refilled today  -cont jardiance 25 mg qday, glipizide 10 mg qday   -check A1C in March, order in chart    Call primary care provider for follow up after discharge/Activities as tolerated/Low salt diet/Monitor weight daily/Report signs and symptoms to primary care provider

## 2023-02-02 NOTE — ASSESSMENT & PLAN NOTE
-slightly elevated in office today but BP runs around 130/80 at home  -he is emotional today regarding concern for his mental health so this could explain elevation today in office   -cont lisinopril 10 mg qday

## 2023-02-02 NOTE — PROGRESS NOTES
"Collin Sandoval presents to [facility] with complaint of  Anxiety (Discuss celexa /ANN - 7 )    SUBJECTIVE  History of Present Illness     Patient is here for anxiety. He is present with his wife. His wide is overall concerned about his mental health. His GAD7 is 7, PHQ 9 is 9. Wife is concerned he may have Asperger's, Autism or ADHD. Patient has been on celexa 20 mg for the past 10+ years. He has never mentioned concern with his mentla health to me in the past. Wife says they are having a hard time getting along. Says Tim \"checks out.\" Tim does not feel he has issues to the extent his wife is describing, but does have some self doubt about himself. The patient denies thoughts of suicide, self-harm, or homicide. Tim is starting counseling in the next few weeks and he and his wife are asking for a referral to see Dr. Latonia Flowers-neuropsychologist. He feels the celexa is not working.    He is also concerned as his gabapentin has not helped is numbness in his feet. This medication has been increased 2 times in the past year without relief. He denies any back pain, leg pain, leg color changes. He is also asking about stopping his lipitor.      OBJECTIVE  Vital Signs:   /80   Pulse 88   Ht 190.5 cm (75\")   BMI 35.00 kg/m²       Physical Exam  Constitutional:       General: He is not in acute distress.     Appearance: Normal appearance. He is not ill-appearing.   HENT:      Head: Normocephalic and atraumatic.      Nose: Nose normal.      Mouth/Throat:      Pharynx: Oropharynx is clear.   Cardiovascular:      Rate and Rhythm: Normal rate and regular rhythm.      Pulses: Normal pulses.      Heart sounds: Normal heart sounds.   Pulmonary:      Effort: Pulmonary effort is normal. No respiratory distress.      Breath sounds: Normal breath sounds.   Chest:      Chest wall: No tenderness.   Musculoskeletal:         General: Normal range of motion.      Cervical back: Normal range of " motion and neck supple.   Skin:     General: Skin is warm and dry.   Neurological:      General: No focal deficit present.      Mental Status: He is alert and oriented to person, place, and time. Mental status is at baseline.   Psychiatric:         Mood and Affect: Mood normal.         Behavior: Behavior normal.        Results Review:    CBC (No Diff) (12/21/2022 12:02)  Basic Metabolic Panel (12/21/2022 12:02)  Hemoglobin A1c (09/23/2022 10:58)  Comprehensive Metabolic Panel (09/23/2022 10:58)  POC Urine Drug Screen [facility] (02/02/2023 14:17)      ASSESSMENT AND PLAN:  Diagnoses and all orders for this visit:    1. Anxiety (Primary)  Assessment & Plan:  -continue celexa 20 mg qday  -hold off on med adjustment until he is evaluated by neuropsych     Orders:  -     Ambulatory Referral to Neuropsychology    2. High risk medication use  -     POC Urine Drug Screen [facility]    3. Hyperlipidemia, unspecified hyperlipidemia type  Assessment & Plan:  -he was advised to continue lipitor as he is diabetic, and would fear his cholesterol levels would increase if he were to stop med  -due for lipid panel in March, order in chart       4. Type 2 diabetes mellitus with hyperglycemia, without long-term current use of insulin ([facility])  Assessment & Plan:  -last A1c 7.2, improved  -out of metformin, refilled today  -cont jardiance 25 mg qday, glipizide 10 mg qday   -check A1C in March, order in chart     Orders:  -     metFORMIN (GLUCOPHAGE) 1000 MG tablet; Take 1 tablet by mouth 2 (Two) Times a Day.  Dispense: 180 tablet; Refill: 1    5. Primary hypertension  Assessment & Plan:  -slightly elevated in office today but BP runs around 130/80 at home  -he is emotional today regarding concern for his mental health so this could explain elevation today in office   -cont lisinopril 10 mg qday       6. Peripheral polyneuropathy  Assessment & Plan:  -wean off gabapentin, instructions given  -start lyrica, titrate accordingly,  educated on med SE    Orders:  -     pregabalin (Lyrica) 25 MG capsule; Take 1 capsule by mouth 2 (Two) Times a Day.  Dispense: 180 capsule; Refill: 0    7. Behavior disturbance  -     Ambulatory Referral to Neuropsychology      Follow Up   No follow-ups on file. Patient to notify office with any acute concerns or issues.  Patient verbalizes understanding, agrees with plan of care and has no further questions upon discharge.     Patient was given instructions and counseling regarding his condition or for health maintenance advice. Please see specific information pulled into the AVS if appropriate.     Discussed the importance of following up with any needed screening tests/labs/specialist appointments and any requested follow-up recommended by me today. Importance of maintaining follow-up discussed and patient accepts that missed appointments can delay diagnosis and potentially lead to worsening of conditions.    Part of this note may be an electronic transcription/translation of spoken language to printed text using the Dragon Dictation System.

## 2023-02-03 ENCOUNTER — TELEPHONE (OUTPATIENT)
Dept: FAMILY MEDICINE CLINIC | Age: 63
End: 2023-02-03
Payer: COMMERCIAL

## 2023-02-03 RX ORDER — CITALOPRAM 20 MG/1
40 TABLET ORAL DAILY
Qty: 90 TABLET | Refills: 0 | Status: SHIPPED | OUTPATIENT
Start: 2023-02-03

## 2023-02-03 RX ORDER — CITALOPRAM 20 MG/1
20 TABLET ORAL DAILY
Qty: 90 TABLET | Refills: 0 | Status: CANCELLED | OUTPATIENT
Start: 2023-02-03

## 2023-02-03 NOTE — TELEPHONE ENCOUNTER
Caller: Collin Sandoval    Relationship: Self    Best call back number: [phone]    What medication are you requesting: CITALOPRAM 50 MG    What are your current symptoms: DEPRESSION    How long have you been experiencing symptoms: 10 YEARS    Have you had these symptoms before:    [x] Yes  [] No    Have you been treated for these symptoms before:   [x] Yes  [] No    If a prescription is needed, what is your preferred pharmacy and phone number: [id number] - [place], [place] - [address]  AT [facility] [address]  - [phone]  - [phone] FX     Additional notes: PATIENT WAS IN THE OFFICE YESTERDAY AND PATIENT STATED THAT  BREANNE MARTINEZ TOLD THE PATIENT  HE COULD INCREASE THIS MEDICATION TO 50MG AND HE WOULD LIKE TO GO AHEAD AND DO THAT. PLEASE CALL THE PATIENT WHEN THIS IS SENT TO THE PHARMACY.

## 2023-02-03 NOTE — TELEPHONE ENCOUNTER
Spoke with pcp who ok'd 40mg increase on Citalopram.   Pt inf, he will take 2 of current rx and when gets close to running out, he will call back for refill.

## 2023-02-07 RX ORDER — EMPAGLIFLOZIN 25 MG/1
TABLET, FILM COATED ORAL
Qty: 90 TABLET | Refills: 1 | Status: SHIPPED | OUTPATIENT
Start: 2023-02-07

## 2023-02-07 NOTE — TELEPHONE ENCOUNTER
Rx Refill Note  Requested Prescriptions     Pending Prescriptions Disp Refills   • Jardiance 25 MG tablet tablet [Pharmacy Med Name: JARDIANCE 25MG TABLETS] 90 tablet 1     Sig: TAKE 1 TABLET BY MOUTH DAILY      Last office visit with prescribing clinician: 2/2/2023      Next office visit with prescribing clinician: Visit date not found         Leidy Kim LPN  02/07/23, 09:39 EST

## 2023-02-10 ENCOUNTER — TREATMENT (OUTPATIENT)
Dept: PHYSICAL THERAPY | Facility: CLINIC | Age: 63
End: 2023-02-10
Payer: COMMERCIAL

## 2023-02-10 DIAGNOSIS — M25.611 DECREASED RIGHT SHOULDER RANGE OF MOTION: ICD-10-CM

## 2023-02-10 DIAGNOSIS — Z96.611 H/O TOTAL SHOULDER REPLACEMENT, RIGHT: Primary | ICD-10-CM

## 2023-02-10 DIAGNOSIS — R29.898 WEAKNESS OF RIGHT ARM: ICD-10-CM

## 2023-02-10 PROCEDURE — 97110 THERAPEUTIC EXERCISES: CPT | Performed by: PHYSICAL THERAPIST

## 2023-02-10 PROCEDURE — 97530 THERAPEUTIC ACTIVITIES: CPT | Performed by: PHYSICAL THERAPIST

## 2023-02-10 PROCEDURE — 97140 MANUAL THERAPY 1/> REGIONS: CPT | Performed by: PHYSICAL THERAPIST

## 2023-02-10 NOTE — PROGRESS NOTES
Re-Assessment / Re-Certification        Patient: Collin Sandoval   : 1960  Diagnosis/ICD-10 Code:  H/O total shoulder replacement, right [Z96.611]  Referring practitioner: Zane Moe MD  Date of Initial Visit: Type: THERAPY  Noted: 2023  Today's Date: 2/10/2023  Patient seen for 6 sessions      Subjective:     Visit Diagnoses:    ICD-10-CM ICD-9-CM   1. H/O total shoulder replacement, right  Z96.611 V43.61   2. Decreased right shoulder range of motion  M25.611 719.51   3. Weakness of right arm  R29.898 729.89     Clinical Progress:  IMPROVED  Home Program Compliance: YES  Treatment has included: therapeutic exercise, manual therapy and electrical stimulation      Subjective Evaluation    History of Present Illness  Mechanism of injury: 0/10 pain levels.    HEP feels good, he is doing them intermittently.    Return 23 to ortho office.           Objective          Tenderness     Additional Tenderness Details  Around the incision site, anterior shoulder      No radicular symptoms noted    Forwards flexed posture - rounded shoulders, R arm in sling    Passive Range of Motion     Right Shoulder   Flexion: 155 degrees   External rotation 0°: 30 degrees     Strength/Myotome Testing     Right Shoulder     Planes of Motion   Flexion: 3   External rotation at 0°: 3     Additional Strength Details  Elbow, wrist, fingers R AROM WFL all directions        Assessment & Plan     Assessment  Impairments: abnormal muscle firing, abnormal or restricted ROM, activity intolerance, impaired physical strength, lacks appropriate home exercise program, pain with function and safety issue  Functional Limitations: carrying objects, lifting, sleeping, pulling, pushing, uncomfortable because of pain, reaching behind back, reaching overhead and unable to perform repetitive tasks  Assessment details: Tim is progressing well within protocol limits.  PROM is improving with flexion each session, external rotation continues to  be very tight, but able to get to around 30 degrees.   Progressed today within protocol limits for AAROM, pt had good tolerance to all.  Education on behind the back IR, very gentle, no cranking stretching.  He goes back to the ortho in two weeks.  Patient will continue to benefit from further PT services to address their remaining deficits noted and progress to achieve their goals.       Goals  Plan Goals: SHOULDER  PROBLEMS:     1. The patient has limited ROM of the right shoulder.    LTG 1: 12 weeks:  The patient will demonstrate 160 degrees of right shoulder flexion, 160 degrees of shoulder abduction, 60 degrees of shoulder external rotation, and 60 degrees of shoulder internal rotation to allow the patient to reach into upper kitchen cabinets and manipulate clothing behind the back with greater ease.    STATUS:  Progressing   STG 1a: 4 weeks:  The patient will demonstrate 120 degrees of right shoulder flexion, 120 degrees of shoulder abduction, 45 degrees of shoulder external rotation, and 45 degrees of shoulder internal rotation.    STATUS:  Progressing   TREATMENT: Manual therapy, therapeutic exercise, home exercise instruction, and modalities as needed to include: electrical stimulation, ultrasound, moist heat, and ice.    2. The patient has limited strength of the right shoulder.   LTG 2: 12 weeks:  The patient will demonstrate 4 /5 strength for right shoulder flexion, abduction, external rotation, and internal rotation in order to demonstrate improved shoulder stability.    STATUS:  Progressing   STG 2a: 4 weeks:  The patient will demonstrate 4- /5 strength for right shoulder flexion, abduction, external rotation, and internal rotation.    STATUS:  Progressing   STG2b:  4 weeks:  The patient will be independent with home exercises.     STATUS:  Progressing   TREATMENT: Manual therapy, therapeutic exercise, home exercise instruction, and modalities as needed to include: electrical stimulation, ultrasound,  moist heat, and ice.     3. The patient complains of pain to the right shoulder.   LTG 3: 12 weeks:  The patient will report a pain rating of 1 /10 or better in order to improve sleep quality and tolerance to performance of activities of daily living.    STATUS:  Met   STG 3a: 4 weeks:  The patient will report a pain rating of 3 /10 or better.     STATUS:  Met   TREATMENT: Manual therapy, therapeutic exercise, home exercise instruction, and modalities as needed to include: electrical stimulation, ultrasound, moist heat, and ice.    4. Carrying, Moving, and Handling Objects Functional Limitation     LTG 4: 12 weeks:  The patient will demonstrate 20 % limitation by achieving a score of  on the QuickDASH.    STATUS:  Progressing   STG 4a: 4 weeks:  The patient will demonstrate 40 % limitation by achieving a score of on the QuickDASH.      STATUS:  Progressing   TREATMENT:  Manual therapy, therapeutic exercise, home exercise instruction, and modalities as needed to include: moist heat, electrical stimulation, and ultrasound.      Plan  Therapy options: will be seen for skilled therapy services  Planned modality interventions: cryotherapy, dry needling, electrical stimulation/Russian stimulation, TENS, thermotherapy (hydrocollator packs) and ultrasound  Planned therapy interventions: manual therapy, flexibility, functional ROM exercises, home exercise program, therapeutic activities, stretching, strengthening, soft tissue mobilization, postural training, neuromuscular re-education, body mechanics training, joint mobilization, ADL retraining and fine motor coordination training  Frequency: 2x week  Duration in weeks: 8  Treatment plan discussed with: patient  Plan details: We are going to do 1x week for the next two weeks, high copays, as protocol progresses we will move back to 2x week.    Continue with protocol restrictions.           Progress toward previous goals: Partially Met    Recommendations: Continue as  planned  Timeframe: 2 months  Prognosis to achieve goals: good    Timed:  Manual Therapy:    16     mins  37374;  Therapeutic Exercise:    9     mins  42309;     Neuromuscular Chris:        mins  82253;    Therapeutic Activity:     8     mins  67623;     Gait Training:           mins  31281;     Ultrasound:          mins  14681;    Canalith Repos           ___  mins  05837      Untimed:  Electrical Stimulation:         mins  02540 ( );  Mechanical Traction:         mins  45336;   Dry Needling:                     mins self pay  Re-Eval:                          mins  57668         Timed Treatment:   33   mins   Total Treatment:     37   mins      PT SIGNATURE: Janeth Irizarry PT, DPT  [place] License: [id number]       Certification Period: 2/10/2023 thru 5/10/2023  I certify that the therapy services are furnished while this patient is under my care.  The services outlined above are required by this patient, and will be reviewed every 90 days.     PHYSICIAN: Zane Moe MD  NPI: [id number]      PHYSICIAN PRINT NAME: ______________________________________________      PHYSICIAN SIGNATURE: ______________________________________________         DATE:________________________________        Please sign and return via fax to [phone].  Thank you, [facility].

## 2023-02-17 ENCOUNTER — TREATMENT (OUTPATIENT)
Dept: PHYSICAL THERAPY | Facility: CLINIC | Age: 63
End: 2023-02-17
Payer: COMMERCIAL

## 2023-02-17 DIAGNOSIS — R29.898 WEAKNESS OF RIGHT ARM: ICD-10-CM

## 2023-02-17 DIAGNOSIS — M25.611 DECREASED RIGHT SHOULDER RANGE OF MOTION: ICD-10-CM

## 2023-02-17 DIAGNOSIS — Z96.611 H/O TOTAL SHOULDER REPLACEMENT, RIGHT: Primary | ICD-10-CM

## 2023-02-17 PROCEDURE — 97110 THERAPEUTIC EXERCISES: CPT | Performed by: PHYSICAL THERAPIST

## 2023-02-17 PROCEDURE — 97140 MANUAL THERAPY 1/> REGIONS: CPT | Performed by: PHYSICAL THERAPIST

## 2023-02-17 NOTE — PROGRESS NOTES
Physical Therapy Daily Treatment Note      Patient: Collin Sandoval   : 1960  Referring practitioner: Zane Moe MD  Date of Initial Visit: Type: THERAPY  Noted: 2023  Today's Date: 2023  Patient seen for 7 sessions           Subjective Evaluation    History of Present Illness    Subjective comment: 0/10       Objective   See Exercise, Manual, and Modality Logs for complete treatment.       Assessment & Plan     Assessment  Impairments: abnormal muscle firing, abnormal or restricted ROM, activity intolerance, impaired physical strength, lacks appropriate home exercise program, pain with function and safety issue  Functional Limitations: carrying objects, lifting, sleeping, pulling, pushing, uncomfortable because of pain, reaching behind back, reaching overhead and unable to perform repetitive tasks  Assessment details: Pt cont to report no pain at tx time. There is excellent passive ROM in the SHLD with movement. Pt reports doing his HEP at home everyday. Postural control was reiterated to the pt at this tx visit.    Goals  Plan Goals: SHOULDER  PROBLEMS:     1. The patient has limited ROM of the right shoulder.    LTG 1: 12 weeks:  The patient will demonstrate 160 degrees of right shoulder flexion, 160 degrees of shoulder abduction, 60 degrees of shoulder external rotation, and 60 degrees of shoulder internal rotation to allow the patient to reach into upper kitchen cabinets and manipulate clothing behind the back with greater ease.    STATUS:  Progressing   STG 1a: 4 weeks:  The patient will demonstrate 120 degrees of right shoulder flexion, 120 degrees of shoulder abduction, 45 degrees of shoulder external rotation, and 45 degrees of shoulder internal rotation.    STATUS:  Progressing   TREATMENT: Manual therapy, therapeutic exercise, home exercise instruction, and modalities as needed to include: electrical stimulation, ultrasound, moist heat, and ice.    2. The patient has limited strength  of the right shoulder.   LTG 2: 12 weeks:  The patient will demonstrate 4 /5 strength for right shoulder flexion, abduction, external rotation, and internal rotation in order to demonstrate improved shoulder stability.    STATUS:  Progressing   STG 2a: 4 weeks:  The patient will demonstrate 4- /5 strength for right shoulder flexion, abduction, external rotation, and internal rotation.    STATUS:  Progressing   STG2b:  4 weeks:  The patient will be independent with home exercises.     STATUS:  Progressing   TREATMENT: Manual therapy, therapeutic exercise, home exercise instruction, and modalities as needed to include: electrical stimulation, ultrasound, moist heat, and ice.     3. The patient complains of pain to the right shoulder.   LTG 3: 12 weeks:  The patient will report a pain rating of 1 /10 or better in order to improve sleep quality and tolerance to performance of activities of daily living.    STATUS:  Met   STG 3a: 4 weeks:  The patient will report a pain rating of 3 /10 or better.     STATUS:  Met   TREATMENT: Manual therapy, therapeutic exercise, home exercise instruction, and modalities as needed to include: electrical stimulation, ultrasound, moist heat, and ice.    4. Carrying, Moving, and Handling Objects Functional Limitation     LTG 4: 12 weeks:  The patient will demonstrate 20 % limitation by achieving a score of  on the QuickDASH.    STATUS:  Progressing   STG 4a: 4 weeks:  The patient will demonstrate 40 % limitation by achieving a score of on the QuickDASH.      STATUS:  Progressing   TREATMENT:  Manual therapy, therapeutic exercise, home exercise instruction, and modalities as needed to include: moist heat, electrical stimulation, and ultrasound.      Plan  Therapy options: will be seen for skilled therapy services  Planned modality interventions: cryotherapy, dry needling, electrical stimulation/Russian stimulation, TENS, thermotherapy (hydrocollator packs) and ultrasound  Planned therapy  interventions: manual therapy, flexibility, functional ROM exercises, home exercise program, therapeutic activities, stretching, strengthening, soft tissue mobilization, postural training, neuromuscular re-education, body mechanics training, joint mobilization, ADL retraining and fine motor coordination training  Frequency: 2x week  Duration in weeks: 8  Treatment plan discussed with: patient  Plan details: Cont within protocol and as pt allows with ROM.            Visit Diagnoses:    ICD-10-CM ICD-9-CM   1. H/O total shoulder replacement, right  Z96.611 V43.61   2. Decreased right shoulder range of motion  M25.611 719.51   3. Weakness of right arm  R29.898 729.89       Progress per Plan of Care    Timed:    Therapeutic Exercise:    14     mins  52098;  Manual Therapy:    10     mins  78429;     Neuromuscular Chris:       mins  58695;    Therapeutic Activity:          mins  85220;     Gait Training:           mins  00274;     Ultrasound:          mins  63321;      Untimed:  Electrical Stimulation:         mins  87753 ( );  Mechanical Traction:         mins  28487;     Timed Treatment:   24   mins   Total Treatment:     26   mins      Lulu Boyle PTA  Physical Therapist Assistant

## 2023-02-22 ENCOUNTER — OFFICE VISIT (OUTPATIENT)
Dept: ORTHOPEDIC SURGERY | Facility: CLINIC | Age: 63
End: 2023-02-22
Payer: COMMERCIAL

## 2023-02-22 VITALS — HEIGHT: 75 IN | BODY MASS INDEX: 33.11 KG/M2 | TEMPERATURE: 97.5 F | WEIGHT: 266.3 LBS

## 2023-02-22 DIAGNOSIS — Z09 SURGERY FOLLOW-UP: Primary | ICD-10-CM

## 2023-02-22 PROCEDURE — 73030 X-RAY EXAM OF SHOULDER: CPT | Performed by: NURSE PRACTITIONER

## 2023-02-22 PROCEDURE — 99024 POSTOP FOLLOW-UP VISIT: CPT | Performed by: NURSE PRACTITIONER

## 2023-02-22 RX ORDER — HYDROCODONE BITARTRATE AND ACETAMINOPHEN 7.5; 325 MG/1; MG/1
1 TABLET ORAL EVERY 8 HOURS PRN
Qty: 42 TABLET | Refills: 0 | Status: SHIPPED | OUTPATIENT
Start: 2023-02-22

## 2023-02-24 ENCOUNTER — TREATMENT (OUTPATIENT)
Dept: PHYSICAL THERAPY | Facility: CLINIC | Age: 63
End: 2023-02-24
Payer: COMMERCIAL

## 2023-02-24 DIAGNOSIS — R29.898 WEAKNESS OF RIGHT ARM: ICD-10-CM

## 2023-02-24 DIAGNOSIS — Z96.611 H/O TOTAL SHOULDER REPLACEMENT, RIGHT: Primary | ICD-10-CM

## 2023-02-24 DIAGNOSIS — M25.611 DECREASED RIGHT SHOULDER RANGE OF MOTION: ICD-10-CM

## 2023-02-24 PROCEDURE — 97110 THERAPEUTIC EXERCISES: CPT | Performed by: PHYSICAL THERAPIST

## 2023-02-24 PROCEDURE — 97140 MANUAL THERAPY 1/> REGIONS: CPT | Performed by: PHYSICAL THERAPIST

## 2023-02-24 NOTE — PROGRESS NOTES
Physical Therapy Daily Treatment Note      Patient: Collin Sandoval   : 1960  Referring practitioner: Zane Moe MD  Date of Initial Visit: Type: THERAPY  Noted: 2023  Today's Date: 2023  Patient seen for 8 sessions           Subjective Evaluation    History of Present Illness    Subjective comment: 2/10       Objective   See Exercise, Manual, and Modality Logs for complete treatment.       Assessment & Plan     Assessment  Impairments: abnormal muscle firing, abnormal or restricted ROM, activity intolerance, impaired physical strength, lacks appropriate home exercise program, pain with function and safety issue  Functional Limitations: carrying objects, lifting, sleeping, pulling, pushing, uncomfortable because of pain, reaching behind back, reaching overhead and unable to perform repetitive tasks  Assessment details: Pt reports low pain level at tx time. Pt is showing excellent ROM for his timeframe. There is still some restrictions in the rotational  Movements. Overall pts progress is excellent at this time.    Goals  Plan Goals: SHOULDER  PROBLEMS:     1. The patient has limited ROM of the right shoulder.    LTG 1: 12 weeks:  The patient will demonstrate 160 degrees of right shoulder flexion, 160 degrees of shoulder abduction, 60 degrees of shoulder external rotation, and 60 degrees of shoulder internal rotation to allow the patient to reach into upper kitchen cabinets and manipulate clothing behind the back with greater ease.    STATUS:  Progressing   STG 1a: 4 weeks:  The patient will demonstrate 120 degrees of right shoulder flexion, 120 degrees of shoulder abduction, 45 degrees of shoulder external rotation, and 45 degrees of shoulder internal rotation.    STATUS:  Progressing   TREATMENT: Manual therapy, therapeutic exercise, home exercise instruction, and modalities as needed to include: electrical stimulation, ultrasound, moist heat, and ice.    2. The patient has limited strength of  the right shoulder.   LTG 2: 12 weeks:  The patient will demonstrate 4 /5 strength for right shoulder flexion, abduction, external rotation, and internal rotation in order to demonstrate improved shoulder stability.    STATUS:  Progressing   STG 2a: 4 weeks:  The patient will demonstrate 4- /5 strength for right shoulder flexion, abduction, external rotation, and internal rotation.    STATUS:  Progressing   STG2b:  4 weeks:  The patient will be independent with home exercises.     STATUS:  Progressing   TREATMENT: Manual therapy, therapeutic exercise, home exercise instruction, and modalities as needed to include: electrical stimulation, ultrasound, moist heat, and ice.     3. The patient complains of pain to the right shoulder.   LTG 3: 12 weeks:  The patient will report a pain rating of 1 /10 or better in order to improve sleep quality and tolerance to performance of activities of daily living.    STATUS:  Met   STG 3a: 4 weeks:  The patient will report a pain rating of 3 /10 or better.     STATUS:  Met   TREATMENT: Manual therapy, therapeutic exercise, home exercise instruction, and modalities as needed to include: electrical stimulation, ultrasound, moist heat, and ice.    4. Carrying, Moving, and Handling Objects Functional Limitation     LTG 4: 12 weeks:  The patient will demonstrate 20 % limitation by achieving a score of  on the QuickDASH.    STATUS:  Progressing   STG 4a: 4 weeks:  The patient will demonstrate 40 % limitation by achieving a score of on the QuickDASH.      STATUS:  Progressing   TREATMENT:  Manual therapy, therapeutic exercise, home exercise instruction, and modalities as needed to include: moist heat, electrical stimulation, and ultrasound.      Plan  Therapy options: will be seen for skilled therapy services  Planned modality interventions: cryotherapy, dry needling, electrical stimulation/Russian stimulation, TENS, thermotherapy (hydrocollator packs) and ultrasound  Planned therapy  interventions: manual therapy, flexibility, functional ROM exercises, home exercise program, therapeutic activities, stretching, strengthening, soft tissue mobilization, postural training, neuromuscular re-education, body mechanics training, joint mobilization, ADL retraining and fine motor coordination training  Frequency: 2x week  Duration in weeks: 8  Treatment plan discussed with: patient  Plan details: Cont within protocol and as pt allows with ROM.            Visit Diagnoses:    ICD-10-CM ICD-9-CM   1. H/O total shoulder replacement, right  Z96.611 V43.61   2. Decreased right shoulder range of motion  M25.611 719.51   3. Weakness of right arm  R29.898 729.89       Progress per Plan of Care    Timed:    Therapeutic Exercise:    12     mins  57426;  Manual Therapy:    13     mins  29433;     Neuromuscular Chris:       mins  97130;    Therapeutic Activity:          mins  36912;     Gait Training:           mins  73376;     Ultrasound:          mins  12251;      Untimed:  Electrical Stimulation:         mins  12180 ( );  Mechanical Traction:         mins  82566;     Timed Treatment:   25   mins   Total Treatment:     28   mins      Lulu Boyle PTA  Physical Therapist Assistant

## 2023-03-01 ENCOUNTER — TREATMENT (OUTPATIENT)
Dept: PHYSICAL THERAPY | Facility: CLINIC | Age: 63
End: 2023-03-01
Payer: COMMERCIAL

## 2023-03-01 DIAGNOSIS — M25.611 DECREASED RIGHT SHOULDER RANGE OF MOTION: ICD-10-CM

## 2023-03-01 DIAGNOSIS — Z96.611 H/O TOTAL SHOULDER REPLACEMENT, RIGHT: Primary | ICD-10-CM

## 2023-03-01 DIAGNOSIS — R29.898 WEAKNESS OF RIGHT ARM: ICD-10-CM

## 2023-03-01 PROCEDURE — 97110 THERAPEUTIC EXERCISES: CPT | Performed by: PHYSICAL THERAPIST

## 2023-03-01 PROCEDURE — 97112 NEUROMUSCULAR REEDUCATION: CPT | Performed by: PHYSICAL THERAPIST

## 2023-03-01 PROCEDURE — 97140 MANUAL THERAPY 1/> REGIONS: CPT | Performed by: PHYSICAL THERAPIST

## 2023-03-01 NOTE — PROGRESS NOTES
Physical Therapy Daily Treatment Note      Patient: Collin Sandoval   : 1960  Referring practitioner: Zane Moe MD  Date of Initial Visit: Type: THERAPY  Noted: 2023  Today's Date: 3/1/2023  Patient seen for 9 sessions           Visit Diagnoses:    ICD-10-CM ICD-9-CM   1. H/O total shoulder replacement, right  Z96.611 V43.61   2. Decreased right shoulder range of motion  M25.611 719.51   3. Weakness of right arm  R29.898 729.89       Subjective Evaluation    History of Present Illness    Subjective comment: He saw the doctor last week - he is no longer having to wear the sling, overall good progress.  0/10 pain.  He still cannot ride his motorcycle.  He is starting to play his guitar a little more.          Objective   See Exercise, Manual, and Modality Logs for complete treatment.       Assessment & Plan     Assessment  Impairments: abnormal muscle firing, abnormal or restricted ROM, activity intolerance, impaired physical strength, lacks appropriate home exercise program, pain with function and safety issue  Functional Limitations: carrying objects, lifting, sleeping, pulling, pushing, uncomfortable because of pain, reaching behind back, reaching overhead and unable to perform repetitive tasks  Assessment details: ROM continues to improve with the R shoulder, progressing at week 7 today.  Added in isometrics for further strengthening, continuing with postural control.      Goals  Plan Goals: SHOULDER  PROBLEMS:     1. The patient has limited ROM of the right shoulder.    LTG 1: 12 weeks:  The patient will demonstrate 160 degrees of right shoulder flexion, 160 degrees of shoulder abduction, 60 degrees of shoulder external rotation, and 60 degrees of shoulder internal rotation to allow the patient to reach into upper kitchen cabinets and manipulate clothing behind the back with greater ease.    STATUS:  Progressing   STG 1a: 4 weeks:  The patient will demonstrate 120 degrees of right shoulder  flexion, 120 degrees of shoulder abduction, 45 degrees of shoulder external rotation, and 45 degrees of shoulder internal rotation.    STATUS:  Progressing   TREATMENT: Manual therapy, therapeutic exercise, home exercise instruction, and modalities as needed to include: electrical stimulation, ultrasound, moist heat, and ice.    2. The patient has limited strength of the right shoulder.   LTG 2: 12 weeks:  The patient will demonstrate 4 /5 strength for right shoulder flexion, abduction, external rotation, and internal rotation in order to demonstrate improved shoulder stability.    STATUS:  Progressing   STG 2a: 4 weeks:  The patient will demonstrate 4- /5 strength for right shoulder flexion, abduction, external rotation, and internal rotation.    STATUS:  Progressing   STG2b:  4 weeks:  The patient will be independent with home exercises.     STATUS:  Progressing   TREATMENT: Manual therapy, therapeutic exercise, home exercise instruction, and modalities as needed to include: electrical stimulation, ultrasound, moist heat, and ice.     3. The patient complains of pain to the right shoulder.   LTG 3: 12 weeks:  The patient will report a pain rating of 1 /10 or better in order to improve sleep quality and tolerance to performance of activities of daily living.    STATUS:  Met   STG 3a: 4 weeks:  The patient will report a pain rating of 3 /10 or better.     STATUS:  Met   TREATMENT: Manual therapy, therapeutic exercise, home exercise instruction, and modalities as needed to include: electrical stimulation, ultrasound, moist heat, and ice.    4. Carrying, Moving, and Handling Objects Functional Limitation     LTG 4: 12 weeks:  The patient will demonstrate 20 % limitation by achieving a score of  on the QuickDASH.    STATUS:  Progressing   STG 4a: 4 weeks:  The patient will demonstrate 40 % limitation by achieving a score of on the QuickDASH.      STATUS:  Progressing   TREATMENT:  Manual therapy, therapeutic exercise,  home exercise instruction, and modalities as needed to include: moist heat, electrical stimulation, and ultrasound.      Plan  Therapy options: will be seen for skilled therapy services  Planned modality interventions: cryotherapy, dry needling, electrical stimulation/Russian stimulation, TENS, thermotherapy (hydrocollator packs) and ultrasound  Planned therapy interventions: manual therapy, flexibility, functional ROM exercises, home exercise program, therapeutic activities, stretching, strengthening, soft tissue mobilization, postural training, neuromuscular re-education, body mechanics training, joint mobilization, ADL retraining and fine motor coordination training  Frequency: 2x week  Duration in weeks: 8  Treatment plan discussed with: patient  Plan details: Review isometrics and AAROM.  Continue to progress within protocol limits for R shoulder ROM and strength.           Progress per Plan of Care and Progress strengthening /stabilization /functional activity           Timed:  Manual Therapy:    14     mins  96830;  Therapeutic Exercise:    8     mins  47307;     Neuromuscular Chris:    8    mins  86442;    Therapeutic Activity:          mins  41269;     Gait Training:           mins  48957;     Ultrasound:          mins  04867;    Canalith Repos           ___  mins  82171      Untimed:  Electrical Stimulation:         mins  58778 ( );  Mechanical Traction:         mins  40845;   Dry Needling:                     mins self pay       Timed Treatment:   30   mins   Total Treatment:     30   mins      Janeth Irizarry PT, ALEKS  [place] License #: [id number]

## 2023-03-10 ENCOUNTER — TREATMENT (OUTPATIENT)
Dept: PHYSICAL THERAPY | Facility: CLINIC | Age: 63
End: 2023-03-10
Payer: COMMERCIAL

## 2023-03-10 DIAGNOSIS — R29.898 WEAKNESS OF RIGHT ARM: ICD-10-CM

## 2023-03-10 DIAGNOSIS — Z96.611 H/O TOTAL SHOULDER REPLACEMENT, RIGHT: Primary | ICD-10-CM

## 2023-03-10 DIAGNOSIS — M25.611 DECREASED RIGHT SHOULDER RANGE OF MOTION: ICD-10-CM

## 2023-03-10 PROCEDURE — 97140 MANUAL THERAPY 1/> REGIONS: CPT | Performed by: PHYSICAL THERAPIST

## 2023-03-10 PROCEDURE — 97112 NEUROMUSCULAR REEDUCATION: CPT | Performed by: PHYSICAL THERAPIST

## 2023-03-10 PROCEDURE — 97110 THERAPEUTIC EXERCISES: CPT | Performed by: PHYSICAL THERAPIST

## 2023-03-10 NOTE — PROGRESS NOTES
Re-Assessment / Re-Certification        Patient: Collin Sandoval   : 1960  Diagnosis/ICD-10 Code:  H/O total shoulder replacement, right [Z96.611]  Referring practitioner: Zane Moe MD  Date of Initial Visit: Type: THERAPY  Noted: 2023  Today's Date: 3/10/2023  Patient seen for 10 sessions      Subjective:     Visit Diagnoses:    ICD-10-CM ICD-9-CM   1. H/O total shoulder replacement, right  Z96.611 V43.61   2. Decreased right shoulder range of motion  M25.611 719.51   3. Weakness of right arm  R29.898 729.89       Clinical Progress:  IMPROVED  Home Program Compliance: YES  Treatment has included: therapeutic exercise, neuromuscular re-education, manual therapy and therapeutic activity      Subjective Evaluation    History of Present Illness  Mechanism of injury: 4/10 pain in the shoulder, he was sore on Wed, no triggers that he notes.     Ice continues to help.     50% better since starting therapy           Objective          Tenderness     Additional Tenderness Details  Anterior shoulder, deltoid    No radicular symptoms noted        Active Range of Motion     Right Shoulder   Flexion: 165 degrees   Extension: 50 degrees   Abduction: 145 degrees   External rotation 0°: 20 degrees   Internal rotation BTB: L4     Strength/Myotome Testing     Right Shoulder     Planes of Motion   Flexion: 3+   Extension: 4-   Abduction: 3+   External rotation at 0°: 3+   Internal rotation at 0°: 3+     Additional Strength Details  Elbow, wrist, fingers R AROM WFL all directions        Assessment & Plan     Assessment  Impairments: abnormal muscle firing, abnormal or restricted ROM, activity intolerance, impaired physical strength, lacks appropriate home exercise program, pain with function and safety issue  Functional Limitations: carrying objects, lifting, sleeping, pulling, pushing, uncomfortable because of pain, reaching behind back, reaching overhead and unable to perform repetitive tasks  Assessment details:  Tim has made progress overall with the R shoulder.  AROM progressions measured today, external rotation is the biggest limitation.  Will progress with A/AAROM stretching at home, ROM progressions in therapy as tolerated.  Added in light band work today, 3 x week for HEP, for further gains in strengthening.  Update HEP handout.  He goes back to the doctor for a follow up 4/10/23.  Patient will continue to benefit from further PT services to address their remaining deficits noted and progress to achieve their goals.       Goals  Plan Goals: SHOULDER  PROBLEMS:     1. The patient has limited ROM of the right shoulder.    LTG 1: 12 weeks:  The patient will demonstrate 160 degrees of right shoulder flexion, 160 degrees of shoulder abduction, 60 degrees of shoulder external rotation, and 60 degrees of shoulder internal rotation to allow the patient to reach into upper kitchen cabinets and manipulate clothing behind the back with greater ease.    STATUS:  Progressing   STG 1a: 4 weeks:  The patient will demonstrate 120 degrees of right shoulder flexion, 120 degrees of shoulder abduction, 45 degrees of shoulder external rotation, and 45 degrees of shoulder internal rotation.    STATUS:  Met all but ER   TREATMENT: Manual therapy, therapeutic exercise, home exercise instruction, and modalities as needed to include: electrical stimulation, ultrasound, moist heat, and ice.    2. The patient has limited strength of the right shoulder.   LTG 2: 12 weeks:  The patient will demonstrate 4 /5 strength for right shoulder flexion, abduction, external rotation, and internal rotation in order to demonstrate improved shoulder stability.    STATUS:  Progressing   STG 2a: 4 weeks:  The patient will demonstrate 4- /5 strength for right shoulder flexion, abduction, external rotation, and internal rotation.    STATUS:  Progressing   STG2b:  4 weeks:  The patient will be independent with home exercises.     STATUS:  Progressing   TREATMENT:  Manual therapy, therapeutic exercise, home exercise instruction, and modalities as needed to include: electrical stimulation, ultrasound, moist heat, and ice.     3. The patient complains of pain to the right shoulder.   LTG 3: 12 weeks:  The patient will report a pain rating of 1 /10 or better in order to improve sleep quality and tolerance to performance of activities of daily living.    STATUS:  Met   STG 3a: 4 weeks:  The patient will report a pain rating of 3 /10 or better.     STATUS:  Met   TREATMENT: Manual therapy, therapeutic exercise, home exercise instruction, and modalities as needed to include: electrical stimulation, ultrasound, moist heat, and ice.    4. Carrying, Moving, and Handling Objects Functional Limitation     LTG 4: 12 weeks:  The patient will demonstrate 20 % limitation by achieving a score of  on the QuickDASH.    STATUS:  Met   STG 4a: 4 weeks:  The patient will demonstrate 40 % limitation by achieving a score of on the QuickDASH.      STATUS:  Met   TREATMENT:  Manual therapy, therapeutic exercise, home exercise instruction, and modalities as needed to include: moist heat, electrical stimulation, and ultrasound.      Plan  Therapy options: will be seen for skilled therapy services  Planned modality interventions: cryotherapy, dry needling, electrical stimulation/Russian stimulation, TENS, thermotherapy (hydrocollator packs) and ultrasound  Planned therapy interventions: manual therapy, flexibility, functional ROM exercises, home exercise program, therapeutic activities, stretching, strengthening, soft tissue mobilization, postural training, neuromuscular re-education, body mechanics training, joint mobilization, ADL retraining and fine motor coordination training  Frequency: 1x week  Duration in weeks: 4  Treatment plan discussed with: patient  Plan details: Review band work and ROM, HEP  Continue to progress within protocol limits for R shoulder ROM and strength.      Pt would like to  move to 1x week at this point.         Progress toward previous goals: Partially Met       Recommendations: Continue as planned  Timeframe: 2 months  Prognosis to achieve goals: good    Timed:  Manual Therapy:    8     mins  62414;  Therapeutic Exercise:    14     mins  46860;     Neuromuscular Chirs:    10    mins  24859;    Therapeutic Activity:          mins  89622;     Gait Training:           mins  64831;     Ultrasound:          mins  58069;    Canalith Repos           __  mins  54412      Untimed:  Electrical Stimulation:         mins  80176 ( );  Mechanical Traction:         mins  74269;   Dry Needling:                     mins self pay  Re-Eval:                           mins  83516         Timed Treatment:   32   mins   Total Treatment:     32   mins        PT SIGNATURE: Janeth Irizarry PT, DPT  [place] License: [id number]       Certification Period: 3/10/2023 thru 6/7/2023  I certify that the therapy services are furnished while this patient is under my care.  The services outlined above are required by this patient, and will be reviewed every 90 days.     PHYSICIAN: Zane Moe MD  NPI: [id number]      PHYSICIAN PRINT NAME: ______________________________________________      PHYSICIAN SIGNATURE: ______________________________________________         DATE:________________________________        Please sign and return via fax to [phone].  Thank you, [facility].

## 2023-03-17 ENCOUNTER — TREATMENT (OUTPATIENT)
Dept: PHYSICAL THERAPY | Facility: CLINIC | Age: 63
End: 2023-03-17
Payer: COMMERCIAL

## 2023-03-17 DIAGNOSIS — R29.898 WEAKNESS OF RIGHT ARM: ICD-10-CM

## 2023-03-17 DIAGNOSIS — Z96.611 H/O TOTAL SHOULDER REPLACEMENT, RIGHT: Primary | ICD-10-CM

## 2023-03-17 DIAGNOSIS — M25.611 DECREASED RIGHT SHOULDER RANGE OF MOTION: ICD-10-CM

## 2023-03-17 PROCEDURE — 97110 THERAPEUTIC EXERCISES: CPT | Performed by: PHYSICAL THERAPIST

## 2023-03-17 PROCEDURE — 97140 MANUAL THERAPY 1/> REGIONS: CPT | Performed by: PHYSICAL THERAPIST

## 2023-03-17 PROCEDURE — 97530 THERAPEUTIC ACTIVITIES: CPT | Performed by: PHYSICAL THERAPIST

## 2023-03-17 NOTE — PROGRESS NOTES
Physical Therapy Daily Treatment Note      Patient: Collin Sandoval   : 1960  Referring practitioner: Zane Moe MD  Date of Initial Visit: Type: THERAPY  Noted: 2023  Today's Date: 3/17/2023  Patient seen for 11 sessions           Visit Diagnoses:    ICD-10-CM ICD-9-CM   1. H/O total shoulder replacement, right  Z96.611 V43.61   2. Decreased right shoulder range of motion  M25.611 719.51   3. Weakness of right arm  R29.898 729.89       Subjective Evaluation    History of Present Illness    Subjective comment: 0/10 pain.  He continues to go to the gym for cardio.  He is using ice at home/night.          Objective   See Exercise, Manual, and Modality Logs for complete treatment.       Assessment & Plan     Assessment  Impairments: abnormal muscle firing, abnormal or restricted ROM, activity intolerance, impaired physical strength, lacks appropriate home exercise program, pain with function and safety issue  Functional Limitations: carrying objects, lifting, sleeping, pulling, pushing, uncomfortable because of pain, reaching behind back, reaching overhead and unable to perform repetitive tasks  Assessment details: External rotation continues to be the biggest limitation, but all ROM directions are improving.  Continued with further strengthening work today, increasing ROM and endurance tolerance.  Continue with 3 x week for HEP.  He goes back to the doctor for a follow up 4/10/23.        Goals  Plan Goals: SHOULDER  PROBLEMS:     1. The patient has limited ROM of the right shoulder.    LTG 1: 12 weeks:  The patient will demonstrate 160 degrees of right shoulder flexion, 160 degrees of shoulder abduction, 60 degrees of shoulder external rotation, and 60 degrees of shoulder internal rotation to allow the patient to reach into upper kitchen cabinets and manipulate clothing behind the back with greater ease.    STATUS:  Progressing   STG 1a: 4 weeks:  The patient will demonstrate 120 degrees of right  shoulder flexion, 120 degrees of shoulder abduction, 45 degrees of shoulder external rotation, and 45 degrees of shoulder internal rotation.    STATUS:  Met all but ER   TREATMENT: Manual therapy, therapeutic exercise, home exercise instruction, and modalities as needed to include: electrical stimulation, ultrasound, moist heat, and ice.    2. The patient has limited strength of the right shoulder.   LTG 2: 12 weeks:  The patient will demonstrate 4 /5 strength for right shoulder flexion, abduction, external rotation, and internal rotation in order to demonstrate improved shoulder stability.    STATUS:  Progressing   STG 2a: 4 weeks:  The patient will demonstrate 4- /5 strength for right shoulder flexion, abduction, external rotation, and internal rotation.    STATUS:  Progressing   STG2b:  4 weeks:  The patient will be independent with home exercises.     STATUS:  Progressing   TREATMENT: Manual therapy, therapeutic exercise, home exercise instruction, and modalities as needed to include: electrical stimulation, ultrasound, moist heat, and ice.     3. The patient complains of pain to the right shoulder.   LTG 3: 12 weeks:  The patient will report a pain rating of 1 /10 or better in order to improve sleep quality and tolerance to performance of activities of daily living.    STATUS:  Met   STG 3a: 4 weeks:  The patient will report a pain rating of 3 /10 or better.     STATUS:  Met   TREATMENT: Manual therapy, therapeutic exercise, home exercise instruction, and modalities as needed to include: electrical stimulation, ultrasound, moist heat, and ice.    4. Carrying, Moving, and Handling Objects Functional Limitation     LTG 4: 12 weeks:  The patient will demonstrate 20 % limitation by achieving a score of  on the QuickDASH.    STATUS:  Met   STG 4a: 4 weeks:  The patient will demonstrate 40 % limitation by achieving a score of on the QuickDASH.      STATUS:  Met   TREATMENT:  Manual therapy, therapeutic exercise, home  exercise instruction, and modalities as needed to include: moist heat, electrical stimulation, and ultrasound.      Plan  Therapy options: will be seen for skilled therapy services  Planned modality interventions: cryotherapy, dry needling, electrical stimulation/Russian stimulation, TENS, thermotherapy (hydrocollator packs) and ultrasound  Planned therapy interventions: manual therapy, flexibility, functional ROM exercises, home exercise program, therapeutic activities, stretching, strengthening, soft tissue mobilization, postural training, neuromuscular re-education, body mechanics training, joint mobilization, ADL retraining and fine motor coordination training  Frequency: 1x week  Duration in weeks: 4  Treatment plan discussed with: patient  Plan details: Continue to progress within protocol limits for R shoulder ROM and strength.  Progress with endurance and stamina increase.          Progress per Plan of Care and Progress strengthening /stabilization /functional activity           Timed:  Manual Therapy:    10     mins  84807;  Therapeutic Exercise:    13     mins  86717;     Neuromuscular Chris:        mins  90683;    Therapeutic Activity:     8     mins  48145;     Gait Training:           mins  21252;     Ultrasound:          mins  85047;    Canalith Repos           __  mins  87514      Untimed:  Electrical Stimulation:         mins  72358 ( );  Mechanical Traction:         mins  62798;   Dry Needling:                     mins self pay       Timed Treatment:   31   mins   Total Treatment:     33   mins      Janeth Irizarry PT, DPT  [place] License #: [id number]

## 2023-03-24 ENCOUNTER — TREATMENT (OUTPATIENT)
Dept: PHYSICAL THERAPY | Facility: CLINIC | Age: 63
End: 2023-03-24
Payer: COMMERCIAL

## 2023-03-24 DIAGNOSIS — M25.611 DECREASED RIGHT SHOULDER RANGE OF MOTION: ICD-10-CM

## 2023-03-24 DIAGNOSIS — R29.898 WEAKNESS OF RIGHT ARM: ICD-10-CM

## 2023-03-24 DIAGNOSIS — Z96.611 H/O TOTAL SHOULDER REPLACEMENT, RIGHT: Primary | ICD-10-CM

## 2023-03-24 PROCEDURE — 97530 THERAPEUTIC ACTIVITIES: CPT | Performed by: PHYSICAL THERAPIST

## 2023-03-24 PROCEDURE — 97110 THERAPEUTIC EXERCISES: CPT | Performed by: PHYSICAL THERAPIST

## 2023-03-24 PROCEDURE — 97112 NEUROMUSCULAR REEDUCATION: CPT | Performed by: PHYSICAL THERAPIST

## 2023-03-24 PROCEDURE — 97140 MANUAL THERAPY 1/> REGIONS: CPT | Performed by: PHYSICAL THERAPIST

## 2023-03-24 NOTE — PROGRESS NOTES
Physical Therapy Daily Treatment Note      Patient: Collin Sandoval   : 1960  Referring practitioner: Zane Moe MD  Date of Initial Visit: Type: THERAPY  Noted: 2023  Today's Date: 3/24/2023  Patient seen for 12 sessions           Visit Diagnoses:    ICD-10-CM ICD-9-CM   1. H/O total shoulder replacement, right  Z96.611 V43.61   2. Decreased right shoulder range of motion  M25.611 719.51   3. Weakness of right arm  R29.898 729.89       Subjective Evaluation    History of Present Illness    Subjective comment: He went to the gym on Monday, lifted 3 lb weights.  His arm was sore, slacked off HEP, but overall the shoulder is fine.  He is now able to sleep some on the R side.  0/10 pain today.          Objective   See Exercise, Manual, and Modality Logs for complete treatment.       Assessment & Plan     Assessment  Impairments: abnormal muscle firing, abnormal or restricted ROM, activity intolerance, impaired physical strength, lacks appropriate home exercise program, pain with function and safety issue  Functional Limitations: carrying objects, lifting, sleeping, pulling, pushing, uncomfortable because of pain, reaching behind back, reaching overhead and unable to perform repetitive tasks  Assessment details: PROM and joint asessment continues to improve in all directions, pain-free motions.  External rotation has some pain at end range.  Continued to progress with strengthening, 2 lbs weights, in different positions.  Good progress today with both ROM and strength.  Continue with HEP.       Goals  Plan Goals: SHOULDER  PROBLEMS:     1. The patient has limited ROM of the right shoulder.    LTG 1: 12 weeks:  The patient will demonstrate 160 degrees of right shoulder flexion, 160 degrees of shoulder abduction, 60 degrees of shoulder external rotation, and 60 degrees of shoulder internal rotation to allow the patient to reach into upper kitchen cabinets and manipulate clothing behind the back with  greater ease.    STATUS:  Progressing   STG 1a: 4 weeks:  The patient will demonstrate 120 degrees of right shoulder flexion, 120 degrees of shoulder abduction, 45 degrees of shoulder external rotation, and 45 degrees of shoulder internal rotation.    STATUS:  Met all but ER   TREATMENT: Manual therapy, therapeutic exercise, home exercise instruction, and modalities as needed to include: electrical stimulation, ultrasound, moist heat, and ice.    2. The patient has limited strength of the right shoulder.   LTG 2: 12 weeks:  The patient will demonstrate 4 /5 strength for right shoulder flexion, abduction, external rotation, and internal rotation in order to demonstrate improved shoulder stability.    STATUS:  Progressing   STG 2a: 4 weeks:  The patient will demonstrate 4- /5 strength for right shoulder flexion, abduction, external rotation, and internal rotation.    STATUS:  Progressing   STG2b:  4 weeks:  The patient will be independent with home exercises.     STATUS:  Progressing   TREATMENT: Manual therapy, therapeutic exercise, home exercise instruction, and modalities as needed to include: electrical stimulation, ultrasound, moist heat, and ice.     3. The patient complains of pain to the right shoulder.   LTG 3: 12 weeks:  The patient will report a pain rating of 1 /10 or better in order to improve sleep quality and tolerance to performance of activities of daily living.    STATUS:  Met   STG 3a: 4 weeks:  The patient will report a pain rating of 3 /10 or better.     STATUS:  Met   TREATMENT: Manual therapy, therapeutic exercise, home exercise instruction, and modalities as needed to include: electrical stimulation, ultrasound, moist heat, and ice.    4. Carrying, Moving, and Handling Objects Functional Limitation     LTG 4: 12 weeks:  The patient will demonstrate 20 % limitation by achieving a score of  on the QuickDASH.    STATUS:  Met   STG 4a: 4 weeks:  The patient will demonstrate 40 % limitation by  achieving a score of on the QuickDASH.      STATUS:  Met   TREATMENT:  Manual therapy, therapeutic exercise, home exercise instruction, and modalities as needed to include: moist heat, electrical stimulation, and ultrasound.      Plan  Therapy options: will be seen for skilled therapy services  Planned modality interventions: cryotherapy, dry needling, electrical stimulation/Russian stimulation, TENS, thermotherapy (hydrocollator packs) and ultrasound  Planned therapy interventions: manual therapy, flexibility, functional ROM exercises, home exercise program, therapeutic activities, stretching, strengthening, soft tissue mobilization, postural training, neuromuscular re-education, body mechanics training, joint mobilization, ADL retraining and fine motor coordination training  Frequency: 1x week  Duration in weeks: 4  Treatment plan discussed with: patient  Plan details: Continue to progress within protocol limits for R shoulder ROM and strength.  Progress with endurance and stamina increase.          Progress per Plan of Care and Progress strengthening /stabilization /functional activity           Timed:  Manual Therapy:    14     mins  07777;  Therapeutic Exercise:    12     mins  04430;     Neuromuscular Chris:    10    mins  27033;    Therapeutic Activity:     8     mins  29200;     Gait Training:           mins  20918;     Ultrasound:          mins  43203;    Canalith Repos           ___  mins  42517      Untimed:  Electrical Stimulation:         mins  85394 ( );  Mechanical Traction:         mins  33203;   Dry Needling:                     mins self pay       Timed Treatment:   44   mins   Total Treatment:     44   mins      Janeth Irizarry PT, ALEKS MONTEZ License #: [id number]

## 2023-03-31 ENCOUNTER — TREATMENT (OUTPATIENT)
Dept: PHYSICAL THERAPY | Facility: CLINIC | Age: 63
End: 2023-03-31
Payer: COMMERCIAL

## 2023-03-31 DIAGNOSIS — R29.898 WEAKNESS OF RIGHT ARM: ICD-10-CM

## 2023-03-31 DIAGNOSIS — M25.611 DECREASED RIGHT SHOULDER RANGE OF MOTION: ICD-10-CM

## 2023-03-31 DIAGNOSIS — Z96.611 H/O TOTAL SHOULDER REPLACEMENT, RIGHT: Primary | ICD-10-CM

## 2023-03-31 PROCEDURE — 97530 THERAPEUTIC ACTIVITIES: CPT | Performed by: PHYSICAL THERAPIST

## 2023-03-31 PROCEDURE — 97110 THERAPEUTIC EXERCISES: CPT | Performed by: PHYSICAL THERAPIST

## 2023-03-31 NOTE — PROGRESS NOTES
Physical Therapy Daily Treatment Note      Patient: Collin Sandoval   : 1960  Referring practitioner: Zane Moe MD  Date of Initial Visit: Type: THERAPY  Noted: 2023  Today's Date: 3/31/2023  Patient seen for 13 sessions           Subjective Evaluation    History of Present Illness    Subjective comment: 1/10       Objective   See Exercise, Manual, and Modality Logs for complete treatment.       Assessment & Plan     Assessment  Impairments: abnormal muscle firing, abnormal or restricted ROM, activity intolerance, impaired physical strength, lacks appropriate home exercise program, pain with function and safety issue  Functional Limitations: carrying objects, lifting, sleeping, pulling, pushing, uncomfortable because of pain, reaching behind back, reaching overhead and unable to perform repetitive tasks  Assessment details: Pt has low pain level at this tx visit and is tolerating most tx without issue. Pt had difficulty with wall push ups. Wall push ups were too easy and window push ups were too hard and the in between on an elevated mat did not work either so this activity was not performed. Increase in resistance and weight with a few exercises were tolerated well.    Goals  Plan Goals: SHOULDER  PROBLEMS:     1. The patient has limited ROM of the right shoulder.    LTG 1: 12 weeks:  The patient will demonstrate 160 degrees of right shoulder flexion, 160 degrees of shoulder abduction, 60 degrees of shoulder external rotation, and 60 degrees of shoulder internal rotation to allow the patient to reach into upper kitchen cabinets and manipulate clothing behind the back with greater ease.    STATUS:  Progressing   STG 1a: 4 weeks:  The patient will demonstrate 120 degrees of right shoulder flexion, 120 degrees of shoulder abduction, 45 degrees of shoulder external rotation, and 45 degrees of shoulder internal rotation.    STATUS:  Met all but ER   TREATMENT: Manual therapy, therapeutic exercise, home  exercise instruction, and modalities as needed to include: electrical stimulation, ultrasound, moist heat, and ice.    2. The patient has limited strength of the right shoulder.   LTG 2: 12 weeks:  The patient will demonstrate 4 /5 strength for right shoulder flexion, abduction, external rotation, and internal rotation in order to demonstrate improved shoulder stability.    STATUS:  Progressing   STG 2a: 4 weeks:  The patient will demonstrate 4- /5 strength for right shoulder flexion, abduction, external rotation, and internal rotation.    STATUS:  Progressing   STG2b:  4 weeks:  The patient will be independent with home exercises.     STATUS:  Progressing   TREATMENT: Manual therapy, therapeutic exercise, home exercise instruction, and modalities as needed to include: electrical stimulation, ultrasound, moist heat, and ice.     3. The patient complains of pain to the right shoulder.   LTG 3: 12 weeks:  The patient will report a pain rating of 1 /10 or better in order to improve sleep quality and tolerance to performance of activities of daily living.    STATUS:  Met   STG 3a: 4 weeks:  The patient will report a pain rating of 3 /10 or better.     STATUS:  Met   TREATMENT: Manual therapy, therapeutic exercise, home exercise instruction, and modalities as needed to include: electrical stimulation, ultrasound, moist heat, and ice.    4. Carrying, Moving, and Handling Objects Functional Limitation     LTG 4: 12 weeks:  The patient will demonstrate 20 % limitation by achieving a score of  on the QuickDASH.    STATUS:  Met   STG 4a: 4 weeks:  The patient will demonstrate 40 % limitation by achieving a score of on the QuickDASH.      STATUS:  Met   TREATMENT:  Manual therapy, therapeutic exercise, home exercise instruction, and modalities as needed to include: moist heat, electrical stimulation, and ultrasound.      Plan  Therapy options: will be seen for skilled therapy services  Planned modality interventions:  cryotherapy, dry needling, electrical stimulation/Russian stimulation, TENS, thermotherapy (hydrocollator packs) and ultrasound  Planned therapy interventions: manual therapy, flexibility, functional ROM exercises, home exercise program, therapeutic activities, stretching, strengthening, soft tissue mobilization, postural training, neuromuscular re-education, body mechanics training, joint mobilization, ADL retraining and fine motor coordination training  Frequency: 1x week  Duration in weeks: 4  Treatment plan discussed with: patient  Plan details: Continue to progress within protocol limits for R shoulder ROM and strength.  Progress with endurance and stamina increase.          Visit Diagnoses:    ICD-10-CM ICD-9-CM   1. H/O total shoulder replacement, right  Z96.611 V43.61   2. Decreased right shoulder range of motion  M25.611 719.51   3. Weakness of right arm  R29.898 729.89       Progress per Plan of Care    Timed:    Therapeutic Exercise:    14     mins  30305;  Manual Therapy:         mins  49685;     Neuromuscular Chris:       mins  71930;    Therapeutic Activity:     12     mins  32110;     Gait Training:           mins  62940;     Ultrasound:          mins  98111;      Untimed:  Electrical Stimulation:         mins  58337 ( );  Mechanical Traction:         mins  16258;     Timed Treatment:   26   mins   Total Treatment:     30   mins      Lulu Boyle PTA  Physical Therapist Assistant

## 2023-04-10 ENCOUNTER — OFFICE VISIT (OUTPATIENT)
Dept: ORTHOPEDIC SURGERY | Facility: CLINIC | Age: 63
End: 2023-04-10
Payer: COMMERCIAL

## 2023-04-10 VITALS — HEIGHT: 75 IN | WEIGHT: 261.8 LBS | TEMPERATURE: 96.6 F | BODY MASS INDEX: 32.55 KG/M2

## 2023-04-10 DIAGNOSIS — Z09 SURGERY FOLLOW-UP: Primary | ICD-10-CM

## 2023-04-10 PROCEDURE — 73030 X-RAY EXAM OF SHOULDER: CPT | Performed by: ORTHOPAEDIC SURGERY

## 2023-04-10 PROCEDURE — 99024 POSTOP FOLLOW-UP VISIT: CPT | Performed by: ORTHOPAEDIC SURGERY

## 2023-04-10 RX ORDER — TRAMADOL HYDROCHLORIDE 50 MG/1
50 TABLET ORAL EVERY 6 HOURS PRN
Qty: 30 TABLET | Refills: 0 | Status: SHIPPED | OUTPATIENT
Start: 2023-04-10

## 2023-04-10 NOTE — PROGRESS NOTES
"Collin Sandoval : 1960 MRN: [id number] DATE: 4/10/2023    DIAGNOSIS: 3 month follow up right shoulder arthroplasty      SUBJECTIVE:  Patient returns today for 3 month follow up of right shoulder replacement.  He reports doing well with no unusual complaints. Still in PT and reports making good progress.  He still has some soreness at night.  His job involves a lot of climbing and lifting.  He does not feel like he could go back to work yet.    OBJECTIVE:    Temp 96.6 °F (35.9 °C) (Temporal)   Ht 190.5 cm (75\")   Wt 119 kg (261 lb 12.8 oz)   BMI 32.72 kg/m²     Review of Systems negative except as above    Exam:  The incision is well healed. No effusion.  Range of motion:  .  ER 60.  IR T10. The arm is soft and nontender.  Good strength with elevation and abduction.  Sensation intact distally.  Brisk cap refill.    DIAGNOSTIC STUDIES    Xrays: AP, scapular Y views of the right shoulder are ordered and reviewed for evaluation of shoulder replacement. They demonstrate a well positioned, well aligned replacement without complicating factors noted.  In comparison with previous films there has been no change.    ASSESSMENT: 3 month follow up right shoulder replacement.    PLAN:   1.  Continue appropriate activity modifications and restrictions as discussed.  I have agreed to keep him off work for now.  I will release him when he feels ready.  2.  Continue PT per protocol.  3.  I explained that one can expect continued improvement in motion, function, and any residual discomfort for up to 1 year after surgery.  4.  Follow up at 1 year unless experiencing any new or concerning symptoms.  5.  Antibiotic prophylaxis discussed    Zane Moe MD    04/10/2023     "

## 2023-04-10 NOTE — LETTER
April 10, 2023     Patient: Collin Sandoval   YOB: 1960   Date of Visit: 4/10/2023       To Whom It May Concern:    It is my medical opinion that Collin Sandoval should remain out of work until July 15, 2023 at which time he can return to regular duty work .           Sincerely,        Zane Moe MD    CC:   No Recipients

## 2023-04-11 ENCOUNTER — TELEPHONE (OUTPATIENT)
Dept: FAMILY MEDICINE CLINIC | Age: 63
End: 2023-04-11
Payer: COMMERCIAL

## 2023-04-11 NOTE — TELEPHONE ENCOUNTER
----- Message from Katrin Dahl sent at 4/4/2023  7:51 AM EDT -----      ----- Message -----  From: SYSTEM  Sent: 4/4/2023   1:26 AM EDT  To: [facility] [place] [place]

## 2023-04-13 ENCOUNTER — LAB (OUTPATIENT)
Dept: LAB | Facility: HOSPITAL | Age: 63
End: 2023-04-13
Payer: COMMERCIAL

## 2023-04-13 DIAGNOSIS — E11.65 TYPE 2 DIABETES MELLITUS WITH HYPERGLYCEMIA, WITHOUT LONG-TERM CURRENT USE OF INSULIN: ICD-10-CM

## 2023-04-13 DIAGNOSIS — E78.5 HYPERLIPIDEMIA, UNSPECIFIED HYPERLIPIDEMIA TYPE: ICD-10-CM

## 2023-04-13 DIAGNOSIS — Z12.5 SCREENING FOR MALIGNANT NEOPLASM OF PROSTATE: ICD-10-CM

## 2023-04-13 DIAGNOSIS — I10 PRIMARY HYPERTENSION: ICD-10-CM

## 2023-04-13 LAB
ALBUMIN SERPL-MCNC: 4.3 G/DL (ref 3.5–5.2)
ALBUMIN UR-MCNC: 9.2 MG/DL
ALBUMIN/GLOB SERPL: 1.6 G/DL
ALP SERPL-CCNC: 89 U/L (ref 39–117)
ALT SERPL W P-5'-P-CCNC: 13 U/L (ref 1–41)
ANION GAP SERPL CALCULATED.3IONS-SCNC: 9 MMOL/L (ref 5–15)
AST SERPL-CCNC: 21 U/L (ref 1–40)
BILIRUB SERPL-MCNC: 0.5 MG/DL (ref 0–1.2)
BUN SERPL-MCNC: 12 MG/DL (ref 8–23)
BUN/CREAT SERPL: 15.4 (ref 7–25)
CALCIUM SPEC-SCNC: 9 MG/DL (ref 8.6–10.5)
CHLORIDE SERPL-SCNC: 105 MMOL/L (ref 98–107)
CHOLEST SERPL-MCNC: 97 MG/DL (ref 0–200)
CO2 SERPL-SCNC: 24 MMOL/L (ref 22–29)
CREAT SERPL-MCNC: 0.78 MG/DL (ref 0.76–1.27)
EGFRCR SERPLBLD CKD-EPI 2021: 101.5 ML/MIN/1.73
GLOBULIN UR ELPH-MCNC: 2.7 GM/DL
GLUCOSE SERPL-MCNC: 132 MG/DL (ref 65–99)
HBA1C MFR BLD: 7.1 % (ref 4.8–5.6)
HDLC SERPL-MCNC: 27 MG/DL (ref 40–60)
LDLC SERPL CALC-MCNC: 51 MG/DL (ref 0–100)
LDLC/HDLC SERPL: 1.88 {RATIO}
POTASSIUM SERPL-SCNC: 4.7 MMOL/L (ref 3.5–5.2)
PROT SERPL-MCNC: 7 G/DL (ref 6–8.5)
PSA SERPL-MCNC: 0.56 NG/ML (ref 0–4)
SODIUM SERPL-SCNC: 138 MMOL/L (ref 136–145)
TRIGL SERPL-MCNC: 96 MG/DL (ref 0–150)
VLDLC SERPL-MCNC: 19 MG/DL (ref 5–40)

## 2023-04-13 PROCEDURE — G0103 PSA SCREENING: HCPCS

## 2023-04-13 PROCEDURE — 80061 LIPID PANEL: CPT

## 2023-04-13 PROCEDURE — 83036 HEMOGLOBIN GLYCOSYLATED A1C: CPT

## 2023-04-13 PROCEDURE — 82043 UR ALBUMIN QUANTITATIVE: CPT

## 2023-04-13 PROCEDURE — 80053 COMPREHEN METABOLIC PANEL: CPT

## 2023-04-13 PROCEDURE — 36415 COLL VENOUS BLD VENIPUNCTURE: CPT

## 2023-04-17 ENCOUNTER — DOCUMENTATION (OUTPATIENT)
Dept: PHYSICAL THERAPY | Facility: CLINIC | Age: 63
End: 2023-04-17
Payer: COMMERCIAL

## 2023-04-17 DIAGNOSIS — R29.898 WEAKNESS OF RIGHT ARM: ICD-10-CM

## 2023-04-17 DIAGNOSIS — Z96.611 H/O TOTAL SHOULDER REPLACEMENT, RIGHT: Primary | ICD-10-CM

## 2023-04-17 DIAGNOSIS — M25.611 DECREASED RIGHT SHOULDER RANGE OF MOTION: ICD-10-CM

## 2023-04-17 NOTE — PROGRESS NOTES
Discharge Summary  Discharge Summary from Physical Therapy Report    Patient Information  Collin Sandoval  1960  Diagnosis/ICD - 10 Code:  H/O total shoulder replacement, right [Z96.611]  Referring practitoner: No ref. provider found  Date of initial visit: 4/17/2023  Today's date: 4/17/2023  Patient was seen for Visit count could not be calculated. Make sure you are using a visit which is associated with an episode. sessions      Visit Diagnoses:    ICD-10-CM ICD-9-CM   1. H/O total shoulder replacement, right  Z96.611 V43.61   2. Decreased right shoulder range of motion  M25.611 719.51   3. Weakness of right arm  R29.898 729.89       Comments:  Patient cancelled all remaining PT appts.  PT called and spoke to pt, he said he was doing everything at the gym that we were doing in PT.  He had a follow up with ortho on 4/10/23.  He will be discharged from PT services at this time.           Janeth Irizarry, PT, DPT  [place] License #: [id number]

## 2023-04-21 RX ORDER — CITALOPRAM 20 MG/1
TABLET ORAL
Qty: 90 TABLET | Refills: 0 | Status: SHIPPED | OUTPATIENT
Start: 2023-04-21

## 2023-04-21 NOTE — TELEPHONE ENCOUNTER
Rx Refill Note  Requested Prescriptions     Pending Prescriptions Disp Refills   • citalopram (CeleXA) 20 MG tablet [Pharmacy Med Name: CITALOPRAM 20MG TABLETS] 90 tablet 0     Sig: TAKE 1 TABLET BY MOUTH DAILY      Last office visit with prescribing clinician: 2/2/2023     Next office visit with prescribing clinician: Visit date not found    Leidy Kim LPN  04/21/23, 09:44 EDT

## 2023-04-22 DIAGNOSIS — E11.65 TYPE 2 DIABETES MELLITUS WITH HYPERGLYCEMIA, WITHOUT LONG-TERM CURRENT USE OF INSULIN: ICD-10-CM

## 2023-04-24 RX ORDER — GLIPIZIDE 10 MG/1
TABLET, FILM COATED, EXTENDED RELEASE ORAL
Qty: 180 TABLET | Refills: 0 | Status: SHIPPED | OUTPATIENT
Start: 2023-04-24

## 2023-04-24 RX ORDER — ATORVASTATIN CALCIUM 20 MG/1
20 TABLET, FILM COATED ORAL DAILY
Qty: 90 TABLET | Refills: 0 | Status: SHIPPED | OUTPATIENT
Start: 2023-04-24

## 2023-04-25 ENCOUNTER — OFFICE VISIT (OUTPATIENT)
Dept: FAMILY MEDICINE CLINIC | Age: 63
End: 2023-04-25
Payer: COMMERCIAL

## 2023-04-25 VITALS
HEIGHT: 75 IN | SYSTOLIC BLOOD PRESSURE: 145 MMHG | TEMPERATURE: 97.7 F | HEART RATE: 86 BPM | OXYGEN SATURATION: 97 % | DIASTOLIC BLOOD PRESSURE: 85 MMHG | BODY MASS INDEX: 32.72 KG/M2

## 2023-04-25 DIAGNOSIS — F41.9 ANXIETY: ICD-10-CM

## 2023-04-25 DIAGNOSIS — I10 PRIMARY HYPERTENSION: ICD-10-CM

## 2023-04-25 DIAGNOSIS — J40 BRONCHITIS: Primary | ICD-10-CM

## 2023-04-25 RX ORDER — LISINOPRIL 20 MG/1
20 TABLET ORAL DAILY
Qty: 90 TABLET | Refills: 1 | Status: SHIPPED | OUTPATIENT
Start: 2023-04-25

## 2023-04-25 RX ORDER — DEXTROMETHORPHAN HYDROBROMIDE AND PROMETHAZINE HYDROCHLORIDE 15; 6.25 MG/5ML; MG/5ML
5 SOLUTION ORAL 4 TIMES DAILY PRN
Qty: 118 ML | Refills: 0 | Status: SHIPPED | OUTPATIENT
Start: 2023-04-25 | End: 2023-05-05

## 2023-04-25 RX ORDER — METHYLPREDNISOLONE 4 MG/1
1 TABLET ORAL DAILY
Qty: 21 TABLET | Refills: 0 | Status: SHIPPED | OUTPATIENT
Start: 2023-04-25

## 2023-04-25 RX ORDER — CITALOPRAM 40 MG/1
40 TABLET ORAL DAILY
Qty: 90 TABLET | Refills: 1 | Status: SHIPPED | OUTPATIENT
Start: 2023-04-25

## 2023-04-25 RX ORDER — ALBUTEROL SULFATE 90 UG/1
2 AEROSOL, METERED RESPIRATORY (INHALATION) EVERY 4 HOURS PRN
Qty: 18 G | Refills: 2 | Status: SHIPPED | OUTPATIENT
Start: 2023-04-25

## 2023-04-25 NOTE — ASSESSMENT & PLAN NOTE
Symptomatic treatment as below. The patient was encouraged to increase rest and fluids. May take Tylenol for pain or fever. If symptoms persist 7 days or longer, notify office.

## 2023-04-25 NOTE — PROGRESS NOTES
"Collin Sandoval presents to [facility] with complaint of  URI (Cough, congestion, wheezing onset 3 days )    SUBJECTIVE  History of Present Illness     URI: This is a new problem. Episode onset: 3 days ago. The problem has been unchanged. There has been no fever. Associated symptoms include congestion, coughing (nonproductive), sneezing, a sore throat and wheezing. Pertinent negatives include no chest pain, diarrhea, ear pain, nausea, sinus pain or vomiting. He has tried nothing for the symptoms.     Patient's blood pressure is also elevated today in office.  Patient reports that his blood pressure normally runs 135-140 systolic at home.  He does not check his blood pressure at home every day.  He denies any symptoms of hypertension.  He is currently on lisinopril 10 mg once per day.    He is also interested in having his Celexa increased for his anxiety.  He is currently on 20 mg once per day but feels the medication could be stronger.  He went to a counselor 1 time and was referred to neuropsychiatrist per his wife's request.  Patient reports today that he is not going to see that specialist as he feels it is not needed.      OBJECTIVE  Vital Signs:   /85 (BP Location: Right arm, Patient Position: Sitting)   Pulse 86   Temp 97.7 °F (36.5 °C) (Oral)   Ht 190.5 cm (75\")   SpO2 97% Comment: room air  BMI 32.72 kg/m²       Physical Exam  Constitutional:       General: He is not in acute distress.     Appearance: Normal appearance. He is not ill-appearing.   HENT:      Head: Normocephalic and atraumatic.      Right Ear: Tympanic membrane and ear canal normal.      Left Ear: Tympanic membrane and ear canal normal.      Nose: Nose normal.      Mouth/Throat:      Pharynx: Oropharynx is clear.   Cardiovascular:      Rate and Rhythm: Normal rate and regular rhythm.      Pulses: Normal pulses.      Heart sounds: Normal heart sounds.   Pulmonary:      Effort: Pulmonary effort is " normal. No respiratory distress.      Breath sounds: Examination of the right-upper field reveals wheezing. Wheezing (faint expiratory ) present.   Chest:      Chest wall: No tenderness.   Musculoskeletal:         General: Normal range of motion.      Cervical back: Normal range of motion and neck supple.   Skin:     General: Skin is warm and dry.   Neurological:      General: No focal deficit present.      Mental Status: He is alert and oriented to person, place, and time. Mental status is at baseline.   Psychiatric:         Mood and Affect: Mood normal.         Behavior: Behavior normal.          Results Review:  The following data was reviewed by Christi Farias, BILL [unfilled] 09:45 EDT.  PSA Screen (04/13/2023 09:01)  Lipid Panel (04/13/2023 09:01)  Hemoglobin A1c (04/13/2023 09:01)  Comprehensive Metabolic Panel (04/13/2023 09:01)      ASSESSMENT AND PLAN:  Diagnoses and all orders for this visit:    1. Bronchitis (Primary)  Assessment & Plan:  Symptomatic treatment as below. The patient was encouraged to increase rest and fluids. May take Tylenol for pain or fever. If symptoms persist 7 days or longer, notify office.       Orders:  -     POCT SARS-CoV-2 Antigen GAIL + Flu  -     albuterol sulfate  (90 Base) MCG/ACT inhaler; Inhale 2 puffs Every 4 (Four) Hours As Needed for Wheezing or Shortness of Air.  Dispense: 18 g; Refill: 2  -     methylPREDNISolone (MEDROL) 4 MG dose pack; Take 1 tablet by mouth Daily. Take as directed on package instructions.  Dispense: 21 tablet; Refill: 0  -     promethazine-dextromethorphan (PROMETHAZINE-DM) 6.25-15 MG/5ML solution; Take 5 mL by mouth 4 (Four) Times a Day As Needed for Cough for up to 10 days.  Dispense: 118 mL; Refill: 0    2. Primary hypertension  Assessment & Plan:  Hypertension is worsening/not at goal.  Medication changes per orders.  Ambulatory blood pressure monitoring.  increase lisinopril to 20 mg qday   Blood pressure will be reassessed at the next regular  appointment.     Orders:  -     lisinopril (PRINIVIL,ZESTRIL) 20 MG tablet; Take 1 tablet by mouth Daily.  Dispense: 90 tablet; Refill: 1    3. Anxiety  Assessment & Plan:  Increasing celexa to 40 mg qday    Orders:  -     citalopram (CeleXA) 40 MG tablet; Take 1 tablet by mouth Daily.  Dispense: 90 tablet; Refill: 1      Follow Up   Return in about 5 months (around 10/4/2023). Patient to notify office with any acute concerns or issues.  Patient verbalizes understanding, agrees with plan of care and has no further questions upon discharge.     Patient was given instructions and counseling regarding his condition or for health maintenance advice. Please see specific information pulled into the AVS if appropriate.     Discussed the importance of following up with any needed screening tests/labs/specialist appointments and any requested follow-up recommended by me today. Importance of maintaining follow-up discussed and patient accepts that missed appointments can delay diagnosis and potentially lead to worsening of conditions.    Part of this note may be an electronic transcription/translation of spoken language to printed text using the Dragon Dictation System.

## 2023-04-25 NOTE — ASSESSMENT & PLAN NOTE
Hypertension is worsening/not at goal.  Medication changes per orders.  Ambulatory blood pressure monitoring.  increase lisinopril to 20 mg qday   Blood pressure will be reassessed at the next regular appointment.

## 2023-05-25 ENCOUNTER — TELEPHONE (OUTPATIENT)
Dept: ORTHOPEDIC SURGERY | Facility: CLINIC | Age: 63
End: 2023-05-25

## 2023-05-25 NOTE — TELEPHONE ENCOUNTER
Caller: PATIENT    Relationship: SELF     Best call back number: [phone]    What form or medical record are you requesting: WORK NOTE STATING HE IS RELEASED TO GO BACK TO WORK ON 06.05.23.    Who is requesting this form or medical record from you: PATIENT    How would you like to receive the form or medical records (pick-up, mail, fax):   If mail, what is the address: [address] [place] [address]    Timeframe paperwork needed: ASAP    Additional notes: PATIENT WAS CALLING TO REQUEST A WORK NOTE STATING HE IS RELEASED TO GO BACK TO WORK ON 06.05.23. PATIENT WOULD LIKE THIS WORK NOTE TO BE MAILED TO HIS HOME ADDRESS. THANK YOU!

## 2023-05-25 NOTE — TELEPHONE ENCOUNTER
PT. CALLED BACK.   STATES THAT IF THIS NOTE CAN BE DONE AT THE [facility] LOCATION- HIS DAUGHTER WORKS IN THE BUILDING AND CAN PICK THIS UP FOR HIM.   PLEASE CALL TO ADVISE.   [phone]   no

## 2023-07-23 DIAGNOSIS — E11.65 TYPE 2 DIABETES MELLITUS WITH HYPERGLYCEMIA, WITHOUT LONG-TERM CURRENT USE OF INSULIN: ICD-10-CM

## 2023-07-24 RX ORDER — LISINOPRIL 10 MG/1
10 TABLET ORAL DAILY
Qty: 90 TABLET | Refills: 1 | OUTPATIENT
Start: 2023-07-24

## 2023-07-24 RX ORDER — ATORVASTATIN CALCIUM 20 MG/1
20 TABLET, FILM COATED ORAL DAILY
Qty: 90 TABLET | Refills: 0 | Status: SHIPPED | OUTPATIENT
Start: 2023-07-24

## 2023-08-17 RX ORDER — EMPAGLIFLOZIN 25 MG/1
TABLET, FILM COATED ORAL
Qty: 90 TABLET | Refills: 1 | Status: SHIPPED | OUTPATIENT
Start: 2023-08-17

## 2023-09-22 DIAGNOSIS — E11.65 TYPE 2 DIABETES MELLITUS WITH HYPERGLYCEMIA, WITHOUT LONG-TERM CURRENT USE OF INSULIN: ICD-10-CM

## 2023-09-22 DIAGNOSIS — G62.9 PERIPHERAL POLYNEUROPATHY: ICD-10-CM

## 2023-09-22 RX ORDER — GLIPIZIDE 10 MG/1
20 TABLET, FILM COATED, EXTENDED RELEASE ORAL DAILY
Qty: 60 TABLET | Refills: 0 | Status: SHIPPED | OUTPATIENT
Start: 2023-09-22

## 2023-09-22 RX ORDER — PREGABALIN 25 MG/1
25 CAPSULE ORAL 2 TIMES DAILY
Qty: 60 CAPSULE | Refills: 0 | Status: SHIPPED | OUTPATIENT
Start: 2023-09-22

## 2023-09-22 NOTE — TELEPHONE ENCOUNTER
Rx Refill Note  Requested Prescriptions     Pending Prescriptions Disp Refills    pregabalin (LYRICA) 25 MG capsule 60 capsule 0     Sig: Take 1 capsule by mouth 2 (Two) Times a Day.     Signed Prescriptions Disp Refills    metFORMIN (GLUCOPHAGE) 1000 MG tablet 60 tablet 0     Sig: Take 1 tablet by mouth 2 (Two) Times a Day With Meals.     Authorizing Provider: BREANNE MARTINEZ     Ordering User: SABINE SOTO    glipizide (GLUCOTROL XL) 10 MG 24 hr tablet 60 tablet 0     Sig: Take 2 tablets by mouth Daily.     Authorizing Provider: BREANNE MARTINEZ     Ordering User: SABINE SOTO      Last office visit with prescribing clinician: 4/25/2023   Last telemedicine visit with prescribing clinician: Visit date not found   Next office visit with prescribing clinician: 10/20/2023     Sabine Soto LPN  09/22/23, 08:37 EDT    LYRICA LF-6/22/23 #180    POC Urine Drug Screen [facility] (02/02/2023 14:17)

## 2023-10-19 ENCOUNTER — OFFICE VISIT (OUTPATIENT)
Dept: ORTHOPEDIC SURGERY | Facility: CLINIC | Age: 63
End: 2023-10-19
Payer: COMMERCIAL

## 2023-10-19 VITALS — WEIGHT: 240 LBS | HEIGHT: 75 IN | TEMPERATURE: 97.1 F | BODY MASS INDEX: 29.84 KG/M2

## 2023-10-19 DIAGNOSIS — Z96.611 HISTORY OF TOTAL SHOULDER REPLACEMENT, RIGHT: Primary | ICD-10-CM

## 2023-10-19 NOTE — PROGRESS NOTES
"Collin Sandoval : 1960 MRN: [id number] DATE: 10/19/2023    DIAGNOSIS: 9 month follow up right shoulder arthroplasty      SUBJECTIVE:  Patient returns today for 9 month follow up of right shoulder replacement.  Patient reports doing well with no unusual complaints.  He is pleased with his range of motion and strength.  He says he continues to have some mild pain with lying on the right side.      OBJECTIVE:    Temp 97.1 °F (36.2 °C)   Ht 190.5 cm (75\")   Wt 109 kg (240 lb)   BMI 30.00 kg/m²     Review of Systems negative except as above    Exam:  The incision is well healed. No effusion.  Range of motion is full.  The arm is soft and nontender.  Good strength with elevation and abduction.  Sensation intact distally.  Brisk cap refill.    DIAGNOSTIC STUDIES    Xrays: AP, scapular Y and axillary views of the right shoulder are ordered and reviewed for evaluation of shoulder replacement. They demonstrate a well positioned, well aligned replacement without complicating factors noted.  In comparison with previous films there has been no change.    ASSESSMENT: 9 month follow up right shoulder replacement.    PLAN:   1.  Continue appropriate activity modifications and restrictions as discussed  2.  Continue PT per protocol.  3.  I explained that one can expect continued improvement in motion, function, and any residual discomfort for up to 1 year after surgery.  4.  Follow up in 1 year with Dr. Moe.    5.  Antibiotic prophylaxis discussed.    Caitlyn Villafuerte, APRN    10/19/2023   "

## 2023-10-20 ENCOUNTER — OFFICE VISIT (OUTPATIENT)
Dept: FAMILY MEDICINE CLINIC | Age: 63
End: 2023-10-20
Payer: COMMERCIAL

## 2023-10-20 VITALS
BODY MASS INDEX: 30 KG/M2 | SYSTOLIC BLOOD PRESSURE: 148 MMHG | HEART RATE: 75 BPM | HEIGHT: 75 IN | OXYGEN SATURATION: 92 % | DIASTOLIC BLOOD PRESSURE: 98 MMHG

## 2023-10-20 DIAGNOSIS — I10 PRIMARY HYPERTENSION: ICD-10-CM

## 2023-10-20 DIAGNOSIS — G62.9 PERIPHERAL POLYNEUROPATHY: ICD-10-CM

## 2023-10-20 DIAGNOSIS — J45.20 MILD INTERMITTENT ASTHMA WITHOUT COMPLICATION: ICD-10-CM

## 2023-10-20 DIAGNOSIS — E11.65 TYPE 2 DIABETES MELLITUS WITH HYPERGLYCEMIA, WITHOUT LONG-TERM CURRENT USE OF INSULIN: Primary | ICD-10-CM

## 2023-10-20 DIAGNOSIS — F41.9 ANXIETY: ICD-10-CM

## 2023-10-20 DIAGNOSIS — E78.5 HYPERLIPIDEMIA, UNSPECIFIED HYPERLIPIDEMIA TYPE: ICD-10-CM

## 2023-10-20 PROCEDURE — 99214 OFFICE O/P EST MOD 30 MIN: CPT | Performed by: NURSE PRACTITIONER

## 2023-10-20 RX ORDER — ATORVASTATIN CALCIUM 20 MG/1
20 TABLET, FILM COATED ORAL DAILY
Qty: 90 TABLET | Refills: 1 | Status: SHIPPED | OUTPATIENT
Start: 2023-10-20

## 2023-10-20 RX ORDER — LISINOPRIL 20 MG/1
20 TABLET ORAL DAILY
Qty: 90 TABLET | Refills: 1 | Status: SHIPPED | OUTPATIENT
Start: 2023-10-20 | End: 2023-10-23

## 2023-10-20 RX ORDER — CITALOPRAM 40 MG/1
40 TABLET ORAL DAILY
Qty: 90 TABLET | Refills: 1 | Status: SHIPPED | OUTPATIENT
Start: 2023-10-20

## 2023-10-20 RX ORDER — ALBUTEROL SULFATE 90 UG/1
2 AEROSOL, METERED RESPIRATORY (INHALATION) EVERY 4 HOURS PRN
Qty: 18 G | Refills: 4 | Status: SHIPPED | OUTPATIENT
Start: 2023-10-20

## 2023-10-20 RX ORDER — GLIPIZIDE 10 MG/1
20 TABLET, FILM COATED, EXTENDED RELEASE ORAL DAILY
Qty: 180 TABLET | Refills: 1 | Status: SHIPPED | OUTPATIENT
Start: 2023-10-20

## 2023-10-20 RX ORDER — ALPRAZOLAM 1 MG/1
1 TABLET ORAL DAILY PRN
Qty: 14 TABLET | Refills: 0 | Status: SHIPPED | OUTPATIENT
Start: 2023-10-20

## 2023-10-20 RX ORDER — PREGABALIN 25 MG/1
25 CAPSULE ORAL 2 TIMES DAILY
Qty: 180 CAPSULE | Refills: 1 | Status: SHIPPED | OUTPATIENT
Start: 2023-10-20

## 2023-10-20 NOTE — PROGRESS NOTES
"Collin Sandoval presents to [facility] with complaint of  Hypertension (6 month f/u), DM, anxiety    SUBJECTIVE  History of Present Illness    Patient is here for follow-up of chronic medical conditions of hypertension, type 2 diabetes, anxiety, hyperlipidemia, and neuropathy.  At his last visit, his lisinopril was increased from 10 mg to 20 mg.  Celexa was also increased from 20 mg to 40 mg.  This has seemed to help somewhat.  Patient is also asking for Xanax refill.  He says he does not take this every day and usually only has to take 1 time per month. He was also started on Lyrica, in place of gabapentin.  His medication also seems to be working more so than the gabapentin.  Patient does mention that certain positions he sits does exacerbate his neuropathy.  His blood pressure is elevated in office today.  Reports that his blood pressure runs 130/70s average at home.  He is asymptomatic of hypertension.  BG at home runs 104.       OBJECTIVE  Vital Signs:   /98   Pulse 75   Ht 190.5 cm (75\")   SpO2 92%   BMI 30.00 kg/m²       Physical Exam  Constitutional:       General: He is not in acute distress.     Appearance: Normal appearance. He is obese. He is not ill-appearing.   HENT:      Head: Normocephalic and atraumatic.      Nose: Nose normal.      Mouth/Throat:      Pharynx: Oropharynx is clear.   Cardiovascular:      Rate and Rhythm: Normal rate and regular rhythm.      Pulses: Normal pulses.      Heart sounds: Normal heart sounds.   Pulmonary:      Effort: Pulmonary effort is normal. No respiratory distress.      Breath sounds: Normal breath sounds.   Chest:      Chest wall: No tenderness.   Musculoskeletal:         General: Normal range of motion.      Cervical back: Normal range of motion and neck supple.   Skin:     General: Skin is warm and dry.   Neurological:      General: No focal deficit present.      Mental Status: He is alert and oriented to person, place, and " time. Mental status is at baseline.   Psychiatric:         Mood and Affect: Mood normal.         Behavior: Behavior normal.          Results Review:  The following data was reviewed by Christi Farias, APRN [unfilled] 12:52 EDT.  MicroAlbumin, Urine, Random - Urine, Clean Catch (04/13/2023 09:01)  PSA Screen (04/13/2023 09:01)  Lipid Panel (04/13/2023 09:01)  Hemoglobin A1c (04/13/2023 09:01)  Comprehensive Metabolic Panel (04/13/2023 09:01)    ASSESSMENT AND PLAN:  Diagnoses and all orders for this visit:    1. Type 2 diabetes mellitus with hyperglycemia, without long-term current use of insulin (Primary)  Assessment & Plan:  Checking A1c.  Medication refills provided as below.  He was reminded to get diabetic eye exam.  He is on statin and ACE.    Orders:  -     glipizide (GLUCOTROL XL) 10 MG 24 hr tablet; Take 2 tablets by mouth Daily.  Dispense: 180 tablet; Refill: 1  -     empagliflozin (Jardiance) 25 MG tablet tablet; Take 1 tablet by mouth Daily.  Dispense: 90 tablet; Refill: 1  -     metFORMIN (GLUCOPHAGE) 1000 MG tablet; Take 1 tablet by mouth 2 (Two) Times a Day With Meals.  Dispense: 180 tablet; Refill: 1  -     Hemoglobin A1c; Future    2. Peripheral polyneuropathy  Assessment & Plan:  Improved with Lyrica, refilled    Orders:  -     pregabalin (LYRICA) 25 MG capsule; Take 1 capsule by mouth 2 (Two) Times a Day.  Dispense: 180 capsule; Refill: 1    3. Primary hypertension  Assessment & Plan:  Blood pressure is elevated in office today, manual recheck improved.  Continue lisinopril 20 mg daily.  Patient was encouraged to monitor his blood pressure at home over the next 2 weeks.  If he finds that his blood pressure is consistently greater than 135/85, we may need to increase his lisinopril to 40 mg.  We will reach out to him in 2 weeks for reassessment.    Orders:  -     lisinopril (PRINIVIL,ZESTRIL) 20 MG tablet; Take 1 tablet by mouth Daily.  Dispense: 90 tablet; Refill: 1  -     Comprehensive Metabolic Panel;  Future    4. Anxiety  Assessment & Plan:  Continue Celexa, refilled Xanax.  Patient understands that regular continued use of benzos is not recommended.  Patient has not had medication refilled for 2 years, so this says that he rarely takes his medication.  Patient understands risk of addiction and dependency with this medication.  Alexander reviewed and is appropriate.  Urine drug screen up-to-date.    Orders:  -     citalopram (CeleXA) 40 MG tablet; Take 1 tablet by mouth Daily.  Dispense: 90 tablet; Refill: 1  -     ALPRAZolam (XANAX) 1 MG tablet; Take 1 tablet by mouth Daily As Needed for Anxiety.  Dispense: 14 tablet; Refill: 0    5. Mild intermittent asthma without complication  -     albuterol sulfate  (90 Base) MCG/ACT inhaler; Inhale 2 puffs Every 4 (Four) Hours As Needed for Wheezing or Shortness of Air.  Dispense: 18 g; Refill: 4    6. Hyperlipidemia, unspecified hyperlipidemia type  Assessment & Plan:  Last lipid panel at goal, continue atorvastatin 20 mg daily    Orders:  -     atorvastatin (LIPITOR) 20 MG tablet; Take 1 tablet by mouth Daily.  Dispense: 90 tablet; Refill: 1          Follow Up   Return in about 6 months (around 4/20/2024) for Annual physical. Patient to notify office with any acute concerns or issues.  Patient verbalizes understanding, agrees with plan of care and has no further questions upon discharge.     Patient was given instructions and counseling regarding his condition or for health maintenance advice. Please see specific information pulled into the AVS if appropriate.     Discussed the importance of following up with any needed screening tests/labs/specialist appointments and any requested follow-up recommended by me today. Importance of maintaining follow-up discussed and patient accepts that missed appointments can delay diagnosis and potentially lead to worsening of conditions.    Part of this note may be an electronic transcription/translation of spoken language to  printed text using the Dragon Dictation System.

## 2023-10-20 NOTE — ASSESSMENT & PLAN NOTE
Continue Celexa, refilled Xanax.  Patient understands that regular continued use of benzos is not recommended.  Patient has not had medication refilled for 2 years, so this says that he rarely takes his medication.  Patient understands risk of addiction and dependency with this medication.  Alexander reviewed and is appropriate.  Urine drug screen up-to-date.

## 2023-10-20 NOTE — ASSESSMENT & PLAN NOTE
Blood pressure is elevated in office today, manual recheck improved.  Continue lisinopril 20 mg daily.  Patient was encouraged to monitor his blood pressure at home over the next 2 weeks.  If he finds that his blood pressure is consistently greater than 135/85, we may need to increase his lisinopril to 40 mg.  We will reach out to him in 2 weeks for reassessment.

## 2023-10-20 NOTE — ASSESSMENT & PLAN NOTE
Checking A1c.  Medication refills provided as below.  He was reminded to get diabetic eye exam.  He is on statin and ACE.

## 2023-10-23 DIAGNOSIS — E78.5 HYPERLIPIDEMIA, UNSPECIFIED HYPERLIPIDEMIA TYPE: ICD-10-CM

## 2023-10-23 RX ORDER — LISINOPRIL 40 MG/1
40 TABLET ORAL DAILY
Qty: 90 TABLET | Refills: 1 | Status: SHIPPED | OUTPATIENT
Start: 2023-10-23

## 2023-10-24 DIAGNOSIS — I10 PRIMARY HYPERTENSION: ICD-10-CM

## 2023-10-24 RX ORDER — LISINOPRIL 20 MG/1
20 TABLET ORAL DAILY
Qty: 90 TABLET | Refills: 1 | OUTPATIENT
Start: 2023-10-24

## 2023-11-03 ENCOUNTER — OFFICE VISIT (OUTPATIENT)
Dept: FAMILY MEDICINE CLINIC | Age: 63
End: 2023-11-03
Payer: COMMERCIAL

## 2023-11-03 VITALS
HEIGHT: 75 IN | SYSTOLIC BLOOD PRESSURE: 156 MMHG | BODY MASS INDEX: 30 KG/M2 | HEART RATE: 66 BPM | DIASTOLIC BLOOD PRESSURE: 94 MMHG

## 2023-11-03 DIAGNOSIS — E66.9 OBESITY (BMI 30-39.9): ICD-10-CM

## 2023-11-03 DIAGNOSIS — I10 PRIMARY HYPERTENSION: Primary | ICD-10-CM

## 2023-11-03 PROCEDURE — 99213 OFFICE O/P EST LOW 20 MIN: CPT | Performed by: NURSE PRACTITIONER

## 2023-11-03 RX ORDER — HYDROCHLOROTHIAZIDE 12.5 MG/1
12.5 TABLET ORAL DAILY
Qty: 30 TABLET | Refills: 0 | Status: SHIPPED | OUTPATIENT
Start: 2023-11-03

## 2023-11-03 RX ORDER — HYDROCHLOROTHIAZIDE 12.5 MG/1
12.5 TABLET ORAL DAILY
Qty: 30 TABLET | Refills: 0 | Status: SHIPPED | OUTPATIENT
Start: 2023-11-03 | End: 2023-11-03 | Stop reason: SDUPTHER

## 2023-11-03 NOTE — ASSESSMENT & PLAN NOTE
Hypertension is  not at goal .  Continue current treatment regimen.  Medication changes per orders.  Ambulatory blood pressure monitoring.  Continue lisinopril 40 mg daily, starting HCTZ 12.5 mg daily  Blood pressure will be reassessed in 2 weeks.Declined scheduling office visit follow up.Will check CMP in 2 weeks. Order in chart

## 2023-11-03 NOTE — PROGRESS NOTES
"Collin Sandoval presents to [facility] with complaint of  Hypertension (BP running high)    SUBJECTIVE  History of Present Illness    Patient is here for hypertension.  2 weeks ago, his lisinopril was increased to 40 mg daily his blood pressure has not been effective by this medication increase.  His average blood pressure is running in the 150s over 90s at home.  Highest systolic blood pressure is 179 at home.     OBJECTIVE  Vital Signs:   /94 (BP Location: Left arm, Patient Position: Sitting)   Pulse 66   Ht 190.5 cm (75\")   BMI 30.00 kg/m²       Physical Exam  Constitutional:       General: He is not in acute distress.     Appearance: Normal appearance. He is obese. He is not ill-appearing.   HENT:      Head: Normocephalic and atraumatic.      Nose: Nose normal.      Mouth/Throat:      Pharynx: Oropharynx is clear.   Cardiovascular:      Rate and Rhythm: Normal rate and regular rhythm.      Pulses: Normal pulses.      Heart sounds: Normal heart sounds.   Pulmonary:      Effort: Pulmonary effort is normal. No respiratory distress.      Breath sounds: Normal breath sounds.   Chest:      Chest wall: No tenderness.   Musculoskeletal:         General: Normal range of motion.      Cervical back: Normal range of motion and neck supple.   Skin:     General: Skin is warm and dry.   Neurological:      General: No focal deficit present.      Mental Status: He is alert and oriented to person, place, and time. Mental status is at baseline.   Psychiatric:         Mood and Affect: Mood normal.         Behavior: Behavior normal.              ASSESSMENT AND PLAN:  Diagnoses and all orders for this visit:    1. Primary hypertension (Primary)  Assessment & Plan:  Hypertension is  not at goal .  Continue current treatment regimen.  Medication changes per orders.  Ambulatory blood pressure monitoring.  Continue lisinopril 40 mg daily, starting HCTZ 12.5 mg daily  Blood pressure will be " reassessed in 2 weeks.Declined scheduling office visit follow up.Will check CMP in 2 weeks. Order in chart    Orders:  -     hydroCHLOROthiazide (HYDRODIURIL) 12.5 MG tablet; Take 1 tablet by mouth Daily.  Dispense: 30 tablet; Refill: 0    2. Obesity (BMI 30-39.9)    Other orders  -     Discontinue: hydroCHLOROthiazide (HYDRODIURIL) 12.5 MG tablet; Take 1 tablet by mouth Daily.  Dispense: 30 tablet; Refill: 0          Follow Up   Return in about 2 weeks (around 11/17/2023). Patient to notify office with any acute concerns or issues.  Patient verbalizes understanding, agrees with plan of care and has no further questions upon discharge.     Patient was given instructions and counseling regarding his condition or for health maintenance advice. Please see specific information pulled into the AVS if appropriate.     Discussed the importance of following up with any needed screening tests/labs/specialist appointments and any requested follow-up recommended by me today. Importance of maintaining follow-up discussed and patient accepts that missed appointments can delay diagnosis and potentially lead to worsening of conditions.    Part of this note may be an electronic transcription/translation of spoken language to printed text using the Dragon Dictation System.

## 2023-11-10 RX ORDER — AMLODIPINE BESYLATE 5 MG/1
5 TABLET ORAL DAILY
Qty: 30 TABLET | Refills: 0 | Status: SHIPPED | OUTPATIENT
Start: 2023-11-10

## 2023-11-18 DIAGNOSIS — E11.65 TYPE 2 DIABETES MELLITUS WITH HYPERGLYCEMIA, WITHOUT LONG-TERM CURRENT USE OF INSULIN: ICD-10-CM

## 2023-12-01 ENCOUNTER — LAB (OUTPATIENT)
Dept: LAB | Facility: HOSPITAL | Age: 63
End: 2023-12-01
Payer: COMMERCIAL

## 2023-12-01 DIAGNOSIS — E11.65 TYPE 2 DIABETES MELLITUS WITH HYPERGLYCEMIA, WITHOUT LONG-TERM CURRENT USE OF INSULIN: ICD-10-CM

## 2023-12-01 DIAGNOSIS — I10 PRIMARY HYPERTENSION: ICD-10-CM

## 2023-12-01 LAB
ALBUMIN SERPL-MCNC: 4.6 G/DL (ref 3.5–5.2)
ALBUMIN/GLOB SERPL: 1.8 G/DL
ALP SERPL-CCNC: 91 U/L (ref 39–117)
ALT SERPL W P-5'-P-CCNC: 14 U/L (ref 1–41)
ANION GAP SERPL CALCULATED.3IONS-SCNC: 10 MMOL/L (ref 5–15)
AST SERPL-CCNC: 17 U/L (ref 1–40)
BILIRUB SERPL-MCNC: 0.5 MG/DL (ref 0–1.2)
BUN SERPL-MCNC: 17 MG/DL (ref 8–23)
BUN/CREAT SERPL: 16 (ref 7–25)
CALCIUM SPEC-SCNC: 9.7 MG/DL (ref 8.6–10.5)
CHLORIDE SERPL-SCNC: 104 MMOL/L (ref 98–107)
CO2 SERPL-SCNC: 25 MMOL/L (ref 22–29)
CREAT SERPL-MCNC: 1.06 MG/DL (ref 0.76–1.27)
EGFRCR SERPLBLD CKD-EPI 2021: 79.4 ML/MIN/1.73
GLOBULIN UR ELPH-MCNC: 2.5 GM/DL
GLUCOSE SERPL-MCNC: 191 MG/DL (ref 65–99)
HBA1C MFR BLD: 8.5 % (ref 4.8–5.6)
POTASSIUM SERPL-SCNC: 4.4 MMOL/L (ref 3.5–5.2)
PROT SERPL-MCNC: 7.1 G/DL (ref 6–8.5)
SODIUM SERPL-SCNC: 139 MMOL/L (ref 136–145)

## 2023-12-01 PROCEDURE — 83036 HEMOGLOBIN GLYCOSYLATED A1C: CPT

## 2023-12-01 PROCEDURE — 80053 COMPREHEN METABOLIC PANEL: CPT

## 2023-12-01 PROCEDURE — 36415 COLL VENOUS BLD VENIPUNCTURE: CPT

## 2023-12-04 RX ORDER — AMLODIPINE BESYLATE 5 MG/1
5 TABLET ORAL DAILY
Qty: 90 TABLET | Refills: 1 | Status: SHIPPED | OUTPATIENT
Start: 2023-12-04

## 2023-12-16 DIAGNOSIS — I10 PRIMARY HYPERTENSION: ICD-10-CM

## 2023-12-18 RX ORDER — HYDROCHLOROTHIAZIDE 12.5 MG/1
12.5 TABLET ORAL DAILY
Qty: 90 TABLET | Refills: 0 | Status: SHIPPED | OUTPATIENT
Start: 2023-12-18

## 2024-01-04 ENCOUNTER — PATIENT MESSAGE (OUTPATIENT)
Dept: FAMILY MEDICINE CLINIC | Age: 64
End: 2024-01-04
Payer: COMMERCIAL

## 2024-01-05 NOTE — TELEPHONE ENCOUNTER
From: Collin Sandoval  To: Christi Farias  Sent: 1/4/2024 2:19 PM EST  Subject: Fernandez Barraza,    It seems that the Jardiance is not working as it should based on my Sugar levels. I would like to try monjurno to see if that helps me lower the sugar level. I would also like to maybe try a testerone booster as well. Let me know your thoughts?

## 2024-01-25 DIAGNOSIS — E11.65 TYPE 2 DIABETES MELLITUS WITH HYPERGLYCEMIA, WITHOUT LONG-TERM CURRENT USE OF INSULIN: ICD-10-CM

## 2024-02-29 DIAGNOSIS — E11.65 TYPE 2 DIABETES MELLITUS WITH HYPERGLYCEMIA, WITHOUT LONG-TERM CURRENT USE OF INSULIN: ICD-10-CM

## 2024-02-29 RX ORDER — EMPAGLIFLOZIN 25 MG/1
25 TABLET, FILM COATED ORAL DAILY
Qty: 90 TABLET | Refills: 0 | Status: SHIPPED | OUTPATIENT
Start: 2024-02-29

## 2024-03-19 ENCOUNTER — PATIENT MESSAGE (OUTPATIENT)
Dept: FAMILY MEDICINE CLINIC | Age: 64
End: 2024-03-19
Payer: COMMERCIAL

## 2024-03-19 DIAGNOSIS — R53.83 OTHER FATIGUE: Primary | ICD-10-CM

## 2024-03-19 DIAGNOSIS — E11.65 TYPE 2 DIABETES MELLITUS WITH HYPERGLYCEMIA, WITHOUT LONG-TERM CURRENT USE OF INSULIN: ICD-10-CM

## 2024-03-19 NOTE — TELEPHONE ENCOUNTER
From: Collin Sandoval  To: Christi Farias  Sent: 3/19/2024 10:23 AM EDT  Subject: A1C    Christi,   I need my A1C checked and testerone level as well. I'd like to come by Friday morning if this is OK?    Tim Sandoval

## 2024-03-22 ENCOUNTER — LAB (OUTPATIENT)
Dept: LAB | Facility: HOSPITAL | Age: 64
End: 2024-03-22
Payer: COMMERCIAL

## 2024-03-22 DIAGNOSIS — E11.65 TYPE 2 DIABETES MELLITUS WITH HYPERGLYCEMIA, WITHOUT LONG-TERM CURRENT USE OF INSULIN: ICD-10-CM

## 2024-03-22 DIAGNOSIS — I10 PRIMARY HYPERTENSION: ICD-10-CM

## 2024-03-22 DIAGNOSIS — R53.83 OTHER FATIGUE: ICD-10-CM

## 2024-03-22 LAB
ALBUMIN SERPL-MCNC: 4.4 G/DL (ref 3.5–5.2)
ALBUMIN/GLOB SERPL: 1.8 G/DL
ALP SERPL-CCNC: 79 U/L (ref 39–117)
ALT SERPL W P-5'-P-CCNC: 12 U/L (ref 1–41)
ANION GAP SERPL CALCULATED.3IONS-SCNC: 10 MMOL/L (ref 5–15)
AST SERPL-CCNC: 14 U/L (ref 1–40)
BILIRUB SERPL-MCNC: 0.5 MG/DL (ref 0–1.2)
BUN SERPL-MCNC: 11 MG/DL (ref 8–23)
BUN/CREAT SERPL: 10.3 (ref 7–25)
CALCIUM SPEC-SCNC: 9.4 MG/DL (ref 8.6–10.5)
CHLORIDE SERPL-SCNC: 105 MMOL/L (ref 98–107)
CO2 SERPL-SCNC: 26 MMOL/L (ref 22–29)
CREAT SERPL-MCNC: 1.07 MG/DL (ref 0.76–1.27)
EGFRCR SERPLBLD CKD-EPI 2021: 78.5 ML/MIN/1.73
GLOBULIN UR ELPH-MCNC: 2.5 GM/DL
GLUCOSE SERPL-MCNC: 175 MG/DL (ref 65–99)
HBA1C MFR BLD: 7.6 % (ref 4.8–5.6)
POTASSIUM SERPL-SCNC: 5.1 MMOL/L (ref 3.5–5.2)
PROT SERPL-MCNC: 6.9 G/DL (ref 6–8.5)
SODIUM SERPL-SCNC: 141 MMOL/L (ref 136–145)

## 2024-03-22 PROCEDURE — 83036 HEMOGLOBIN GLYCOSYLATED A1C: CPT

## 2024-03-22 PROCEDURE — 80053 COMPREHEN METABOLIC PANEL: CPT

## 2024-03-22 PROCEDURE — 84402 ASSAY OF FREE TESTOSTERONE: CPT

## 2024-03-22 PROCEDURE — 36415 COLL VENOUS BLD VENIPUNCTURE: CPT

## 2024-03-22 PROCEDURE — 84403 ASSAY OF TOTAL TESTOSTERONE: CPT

## 2024-03-22 RX ORDER — HYDROCHLOROTHIAZIDE 12.5 MG/1
12.5 TABLET ORAL DAILY
Qty: 30 TABLET | Refills: 0 | Status: SHIPPED | OUTPATIENT
Start: 2024-03-22

## 2024-03-26 LAB
TESTOST FREE SERPL-MCNC: 5.8 PG/ML (ref 6.6–18.1)
TESTOST SERPL-MCNC: 294 NG/DL (ref 264–916)

## 2024-03-29 ENCOUNTER — TELEPHONE (OUTPATIENT)
Dept: FAMILY MEDICINE CLINIC | Age: 64
End: 2024-03-29
Payer: COMMERCIAL

## 2024-03-29 NOTE — TELEPHONE ENCOUNTER
"Caller: Collin Sandoval \"SHAWANDA\"    Relationship: Self    Best call back number: [phone]     What medication are you requesting: MOUNJARO - 7.5 MG    If a prescription is needed, what is your preferred pharmacy and phone number: [facility] #[id number] - [place], [place] - [address] AT [facility] OF [address]  - [phone]  - [phone] FX     Additional notes:    PATIENT STATES HE IS DUE FOR A DOSAGE INCREASE.  "

## 2024-04-04 ENCOUNTER — OFFICE VISIT (OUTPATIENT)
Dept: FAMILY MEDICINE CLINIC | Age: 64
End: 2024-04-04
Payer: COMMERCIAL

## 2024-04-04 ENCOUNTER — PATIENT MESSAGE (OUTPATIENT)
Dept: FAMILY MEDICINE CLINIC | Age: 64
End: 2024-04-04

## 2024-04-04 VITALS
HEIGHT: 75 IN | DIASTOLIC BLOOD PRESSURE: 87 MMHG | BODY MASS INDEX: 33.52 KG/M2 | SYSTOLIC BLOOD PRESSURE: 148 MMHG | HEART RATE: 65 BPM | WEIGHT: 269.6 LBS

## 2024-04-04 DIAGNOSIS — R06.2 WHEEZING: ICD-10-CM

## 2024-04-04 DIAGNOSIS — J30.2 SEASONAL ALLERGIES: ICD-10-CM

## 2024-04-04 DIAGNOSIS — Z00.00 ANNUAL PHYSICAL EXAM: Primary | ICD-10-CM

## 2024-04-04 DIAGNOSIS — Z12.5 SCREENING FOR MALIGNANT NEOPLASM OF PROSTATE: ICD-10-CM

## 2024-04-04 DIAGNOSIS — E66.9 OBESITY (BMI 30-39.9): ICD-10-CM

## 2024-04-04 DIAGNOSIS — Z13.29 SCREENING FOR THYROID DISORDER: ICD-10-CM

## 2024-04-04 DIAGNOSIS — I10 PRIMARY HYPERTENSION: ICD-10-CM

## 2024-04-04 DIAGNOSIS — E11.65 TYPE 2 DIABETES MELLITUS WITH HYPERGLYCEMIA, WITHOUT LONG-TERM CURRENT USE OF INSULIN: ICD-10-CM

## 2024-04-04 DIAGNOSIS — F41.9 ANXIETY: ICD-10-CM

## 2024-04-04 DIAGNOSIS — G62.9 PERIPHERAL POLYNEUROPATHY: ICD-10-CM

## 2024-04-04 DIAGNOSIS — E78.5 HYPERLIPIDEMIA, UNSPECIFIED HYPERLIPIDEMIA TYPE: ICD-10-CM

## 2024-04-04 RX ORDER — GLIPIZIDE 10 MG/1
20 TABLET, FILM COATED, EXTENDED RELEASE ORAL DAILY
Qty: 180 TABLET | Refills: 1 | Status: SHIPPED | OUTPATIENT
Start: 2024-04-04

## 2024-04-04 RX ORDER — METHYLPREDNISOLONE 4 MG/1
1 TABLET ORAL DAILY
Qty: 21 TABLET | Refills: 0 | Status: SHIPPED | OUTPATIENT
Start: 2024-04-04

## 2024-04-04 RX ORDER — ATORVASTATIN CALCIUM 20 MG/1
20 TABLET, FILM COATED ORAL DAILY
Qty: 90 TABLET | Refills: 1 | OUTPATIENT
Start: 2024-04-04

## 2024-04-04 RX ORDER — PREGABALIN 25 MG/1
25 CAPSULE ORAL 2 TIMES DAILY
Qty: 180 CAPSULE | Refills: 1 | Status: SHIPPED | OUTPATIENT
Start: 2024-04-04

## 2024-04-04 RX ORDER — HYDROCHLOROTHIAZIDE 12.5 MG/1
12.5 TABLET ORAL DAILY
Qty: 90 TABLET | Refills: 1 | Status: SHIPPED | OUTPATIENT
Start: 2024-04-04

## 2024-04-04 RX ORDER — AMLODIPINE BESYLATE 5 MG/1
5 TABLET ORAL DAILY
Qty: 90 TABLET | Refills: 1 | Status: SHIPPED | OUTPATIENT
Start: 2024-04-04

## 2024-04-04 RX ORDER — LISINOPRIL 40 MG/1
40 TABLET ORAL DAILY
Qty: 90 TABLET | Refills: 1 | Status: SHIPPED | OUTPATIENT
Start: 2024-04-04

## 2024-04-04 RX ORDER — ALPRAZOLAM 1 MG/1
1 TABLET ORAL DAILY PRN
Qty: 14 TABLET | Refills: 0 | Status: SHIPPED | OUTPATIENT
Start: 2024-04-04

## 2024-04-04 RX ORDER — ATORVASTATIN CALCIUM 20 MG/1
20 TABLET, FILM COATED ORAL DAILY
Qty: 90 TABLET | Refills: 1 | Status: SHIPPED | OUTPATIENT
Start: 2024-04-04

## 2024-04-04 RX ORDER — CITALOPRAM 40 MG/1
40 TABLET ORAL DAILY
Qty: 90 TABLET | Refills: 1 | Status: SHIPPED | OUTPATIENT
Start: 2024-04-04

## 2024-04-04 RX ORDER — GLIPIZIDE 10 MG/1
20 TABLET, FILM COATED, EXTENDED RELEASE ORAL DAILY
Qty: 180 TABLET | Refills: 1 | OUTPATIENT
Start: 2024-04-04

## 2024-04-04 RX ORDER — HYDROCHLOROTHIAZIDE 12.5 MG/1
12.5 TABLET ORAL DAILY
Qty: 90 TABLET | OUTPATIENT
Start: 2024-04-04

## 2024-04-04 NOTE — ASSESSMENT & PLAN NOTE
Blood pressure elevated in office but runs normal at home per patient.  Continue lisinopril 40 mg daily, HCTZ 12.5 mg daily, and amlodipine 5 mg daily.

## 2024-04-04 NOTE — PROGRESS NOTES
"Collin Sandoval presents to [facility] with complaint of  Hypertension (Follow up )    SUBJECTIVE  History of Present Illness    Patient is here for follow-up of chronic medical conditions of hypertension, type 2 diabetes, anxiety, hyperlipidemia, and neuropathy.  He is on lisinopril 40 mg amlodipine 5 mg HCTZ 12.5 mg for HTN. His blood pressure is elevated in office today.  Says that he has been out of HCTZ. reports that his blood pressure runs 130s over 80s. Celexa 40 mg for ANN.  On lyrica for neuropathy, working better then gabapentin. BG at home runs 100s.  He is currently on Mounjaro 5 mg weekly, he would like to go up to 7.5 mg weekly.  His last A1c was 7.6.  He tolerates the Mounjaro well other than feeling nauseated every now and then.  Patient says that he feels rowland and does not feel like eating as much as he was.     Initially, patient refused to weigh today.  He has refused to weigh the past couple of visits.  Last recorded weight that we have back in October shows that he was 240 pounds.  Patient says that he self-reported this weight and he was not actually weighed at that time.  Today's weight is 269 pounds.  Patient says that he has lost 30 pounds since October.     Patient has also felt mildly wheezy.  No other upper respiratory infection symptoms.  He feels that this is allergy related.  He is using over-the-counter antihistamines.  Asking if steroid may be beneficial.  He is also going on a flight, requesting more Xanax.  He was prescribed 14 pills back in October, reports that the pharmacy only dispensed 4 pills.       OBJECTIVE  Vital Signs:   /87 (BP Location: Left arm, Patient Position: Sitting)   Pulse 65   Ht 190.5 cm (75\")   Wt 122 kg (269 lb 9.6 oz)   BMI 33.70 kg/m²       Physical Exam  Constitutional:       General: He is not in acute distress.     Appearance: Normal appearance. He is obese. He is not ill-appearing.   HENT:      Head: " Normocephalic and atraumatic.      Right Ear: Tympanic membrane and ear canal normal.      Left Ear: Tympanic membrane and ear canal normal.      Nose: Nose normal.      Mouth/Throat:      Pharynx: Oropharynx is clear.   Neck:      Thyroid: No thyroid mass, thyromegaly or thyroid tenderness.   Cardiovascular:      Rate and Rhythm: Normal rate and regular rhythm.      Pulses: Normal pulses.      Heart sounds: Normal heart sounds.   Pulmonary:      Effort: Pulmonary effort is normal. No respiratory distress.      Breath sounds: Examination of the right-upper field reveals wheezing. Examination of the left-upper field reveals wheezing. Wheezing (Faint expiratory) present.   Chest:      Chest wall: No tenderness.   Musculoskeletal:         General: Normal range of motion.      Cervical back: Normal range of motion and neck supple.   Lymphadenopathy:      Cervical: No cervical adenopathy.   Skin:     General: Skin is warm and dry.   Neurological:      General: No focal deficit present.      Mental Status: He is alert and oriented to person, place, and time. Mental status is at baseline.   Psychiatric:         Mood and Affect: Mood normal.         Behavior: Behavior normal.          Results Review:  The following data was reviewed by BILL Baker [unfilled] 08:53 EDT.    Hemoglobin A1c (03/22/2024 08:09)  Comprehensive Metabolic Panel (03/22/2024 08:09)  Testosterone, Free, Total (03/22/2024 08:09)    ASSESSMENT AND PLAN:  Diagnoses and all orders for this visit:    1. Annual physical exam (Primary)  Assessment & Plan:  Up-to-date on health screenings, discussed healthy diet, regular exercise    Orders:  -     CBC w AUTO Differential; Future    2. Hyperlipidemia, unspecified hyperlipidemia type  Assessment & Plan:  Due for lipid panel, continue atorvastatin 20 mg for now      Orders:  -     atorvastatin (LIPITOR) 20 MG tablet; Take 1 tablet by mouth Daily.  Dispense: 90 tablet; Refill: 1  -     Lipid Panel; Future    3.  Anxiety  Assessment & Plan:  Stable with Celexa, given more Xanax due to upcoming flight    Orders:  -     citalopram (CeleXA) 40 MG tablet; Take 1 tablet by mouth Daily.  Dispense: 90 tablet; Refill: 1  -     ALPRAZolam (XANAX) 1 MG tablet; Take 1 tablet by mouth Daily As Needed for Anxiety.  Dispense: 14 tablet; Refill: 0    4. Type 2 diabetes mellitus with hyperglycemia, without long-term current use of insulin  Assessment & Plan:  A1c has improved since starting Mounjaro.  Increasing Mounjaro to 7.5 mg weekly.  He is also on metformin 1000 mg twice per day, glipizide 20 mg daily    Orders:  -     glipizide (GLUCOTROL XL) 10 MG 24 hr tablet; Take 2 tablets by mouth Daily.  Dispense: 180 tablet; Refill: 1  -     metFORMIN (GLUCOPHAGE) 1000 MG tablet; Take 1 tablet by mouth 2 (Two) Times a Day.  Dispense: 180 tablet; Refill: 1  -     Tirzepatide (MOUNJARO) 7.5 MG/0.5ML solution pen-injector pen; Inject 0.5 mL under the skin into the appropriate area as directed 1 (One) Time Per Week.  Dispense: 3 mL; Refill: 3  -     Hemoglobin A1c; Future  -     MicroAlbumin, Urine, Random - Urine, Clean Catch; Future  -     Comprehensive metabolic panel; Future    5. Primary hypertension  Assessment & Plan:  Blood pressure elevated in office but runs normal at home per patient.  Continue lisinopril 40 mg daily, HCTZ 12.5 mg daily, and amlodipine 5 mg daily.    Orders:  -     amLODIPine (NORVASC) 5 MG tablet; Take 1 tablet by mouth Daily.  Dispense: 90 tablet; Refill: 1  -     hydroCHLOROthiazide 12.5 MG tablet; Take 1 tablet by mouth Daily.  Dispense: 90 tablet; Refill: 1  -     lisinopril (PRINIVIL,ZESTRIL) 40 MG tablet; Take 1 tablet by mouth Daily.  Dispense: 90 tablet; Refill: 1    6. Peripheral polyneuropathy  Assessment & Plan:  Improved, continue Lyrica    Orders:  -     pregabalin (LYRICA) 25 MG capsule; Take 1 capsule by mouth 2 (Two) Times a Day.  Dispense: 180 capsule; Refill: 1    7. Screening for malignant neoplasm  of prostate  -     PSA SCREENING; Future    8. Wheezing  Comments:  Patient understands steroid can cause increased blood sugar while on medication  Orders:  -     methylPREDNISolone (MEDROL) 4 MG dose pack; Take 1 tablet by mouth Daily. Take as directed on package instructions.  Dispense: 21 tablet; Refill: 0    9. Seasonal allergies  Comments:  Continue over-the-counter antihistamine    10. Obesity (BMI 30-39.9)  Assessment & Plan:  Patient's (Body mass index is 33.7 kg/m².) indicates that they are obese (BMI >30) with health conditions that include hypertension, diabetes mellitus, dyslipidemias, and osteoarthritis . Weight is  not accurate due to patient refusing to be weighed past couple of visits patient understands that if he like to continue Mounjaro, he will need to be weighed every time he comes to the office . BMI  is above average; BMI management plan is completed. We discussed portion control and increasing exercise.       11. Screening for thyroid disorder  -     TSH Rfx On Abnormal To Free T4; Future            Follow Up   Return in about 6 months (around 10/4/2024). Patient to notify office with any acute concerns or issues.  Patient verbalizes understanding, agrees with plan of care and has no further questions upon discharge.     Patient was given instructions and counseling regarding his condition or for health maintenance advice. Please see specific information pulled into the AVS if appropriate.     Discussed the importance of following up with any needed screening tests/labs/specialist appointments and any requested follow-up recommended by me today. Importance of maintaining follow-up discussed and patient accepts that missed appointments can delay diagnosis and potentially lead to worsening of conditions.    Part of this note may be an electronic transcription/translation of spoken language to printed text using the Dragon Dictation System.

## 2024-04-04 NOTE — ASSESSMENT & PLAN NOTE
Patient's (Body mass index is 33.7 kg/m².) indicates that they are obese (BMI >30) with health conditions that include hypertension, diabetes mellitus, dyslipidemias, and osteoarthritis . Weight is  not accurate due to patient refusing to be weighed past couple of visits patient understands that if he like to continue Mounjaro, he will need to be weighed every time he comes to the office . BMI  is above average; BMI management plan is completed. We discussed portion control and increasing exercise.

## 2024-04-04 NOTE — ASSESSMENT & PLAN NOTE
A1c has improved since starting Mounjaro.  Increasing Mounjaro to 7.5 mg weekly.  He is also on metformin 1000 mg twice per day, glipizide 20 mg daily

## 2024-04-05 RX ORDER — SILDENAFIL 25 MG/1
25 TABLET, FILM COATED ORAL DAILY PRN
Qty: 30 TABLET | Refills: 1 | Status: SHIPPED | OUTPATIENT
Start: 2024-04-05

## 2024-04-05 NOTE — TELEPHONE ENCOUNTER
From: Collin Sandoval  To: Christi Farias  Sent: 4/4/2024 2:12 PM EDT  Subject: Testerone/Erectile Dysfunction    Christi,    Forgot to ask you about testerone level and something for erectile dysfunction. Can you give me something for these?    Tim Sandoval

## 2024-04-17 ENCOUNTER — TELEPHONE (OUTPATIENT)
Dept: FAMILY MEDICINE CLINIC | Age: 64
End: 2024-04-17
Payer: COMMERCIAL

## 2024-04-17 DIAGNOSIS — J45.20 MILD INTERMITTENT ASTHMA WITHOUT COMPLICATION: ICD-10-CM

## 2024-04-17 RX ORDER — ALBUTEROL SULFATE 90 UG/1
2 AEROSOL, METERED RESPIRATORY (INHALATION) EVERY 4 HOURS PRN
Qty: 6.7 G | Refills: 2 | Status: SHIPPED | OUTPATIENT
Start: 2024-04-17

## 2024-04-17 NOTE — TELEPHONE ENCOUNTER
"    Caller: Collin Sandoval \"SHAWANDA\"    Relationship: Self    Best call back number: [phone]    Requested Prescriptions:   Requested Prescriptions      No prescriptions requested or ordered in this encounter    albuterol sulfate  (90 Base) MCG/ACT inhaler     Pharmacy where request should be sent: [id number] - [place], [place] - [address] AT [facility] OF [address] [facility] - [phone] [facility] [phone] FX     Last office visit with prescribing clinician: 4/4/2024   Last telemedicine visit with prescribing clinician: Visit date not found   Next office visit with prescribing clinician: Visit date not found     Additional details provided by patient: PATIENT HAS LESS THAN A THREE DAY SUPPLY OF HIS ALBUTEROL INHALER. PLEASE SEND NEW PRESCRIPTION TO PHARMACY ASAP.    Does the patient have less than a 3 day supply:  [x] Yes  [] No    Would you like a call back once the refill request has been completed: [] Yes [] No    If the office needs to give you a call back, can they leave a voicemail: [] Yes [] No    Jase Sandoval Rep   04/17/24 10:12 EDT           "

## 2024-04-25 DIAGNOSIS — I10 PRIMARY HYPERTENSION: ICD-10-CM

## 2024-04-25 RX ORDER — LISINOPRIL 20 MG/1
20 TABLET ORAL DAILY
Qty: 90 TABLET | Refills: 1 | OUTPATIENT
Start: 2024-04-25

## 2024-04-25 RX ORDER — HYDROCHLOROTHIAZIDE 12.5 MG/1
12.5 TABLET ORAL DAILY
Qty: 90 TABLET | Refills: 1 | OUTPATIENT
Start: 2024-04-25

## 2024-05-16 DIAGNOSIS — E11.65 TYPE 2 DIABETES MELLITUS WITH HYPERGLYCEMIA, WITHOUT LONG-TERM CURRENT USE OF INSULIN: Primary | ICD-10-CM

## 2024-05-16 RX ORDER — SEMAGLUTIDE 1.34 MG/ML
INJECTION, SOLUTION SUBCUTANEOUS
Qty: 6 ML | Refills: 0 | Status: SHIPPED | OUTPATIENT
Start: 2024-05-16 | End: 2024-08-13

## 2024-07-01 DIAGNOSIS — E11.65 TYPE 2 DIABETES MELLITUS WITH HYPERGLYCEMIA, WITHOUT LONG-TERM CURRENT USE OF INSULIN: ICD-10-CM

## 2024-07-01 NOTE — TELEPHONE ENCOUNTER
Rx Refill Note  Requested Prescriptions     Pending Prescriptions Disp Refills    Ozempic, 0.25 or 0.5 MG/DOSE, 2 MG/3ML solution pen-injector [Pharmacy Med Name: OZEMPIC 0.25 OR 0.5MG/[id number] 3ML] 6 mL      Sig: INJECT 0.25 MG UNDER THE SKIN ONCE WEEKLY FOR 30 DAYS THEN INJECT 0.5 MG ONCE PER WEEK FOR 60 DAYS      Last office visit with prescribing clinician: 4/4/2024   Last telemedicine visit with prescribing clinician: Visit date not found   Next office visit with prescribing clinician: Visit date not found       Sabine Soto LPN      07/01/24, 16:03 EDT    -5/16/24 #6    Hemoglobin A1c (03/22/2024 08:09)

## 2024-07-01 NOTE — TELEPHONE ENCOUNTER
Due for labs as ordered in his chart, if he is completely out we can send in 30 day supply to hold him over

## 2024-07-02 RX ORDER — SEMAGLUTIDE 0.68 MG/ML
INJECTION, SOLUTION SUBCUTANEOUS
Qty: 6 ML | Refills: 0 | Status: SHIPPED | OUTPATIENT
Start: 2024-07-02

## 2024-07-09 ENCOUNTER — LAB (OUTPATIENT)
Dept: LAB | Facility: HOSPITAL | Age: 64
End: 2024-07-09
Payer: COMMERCIAL

## 2024-07-09 DIAGNOSIS — E11.65 TYPE 2 DIABETES MELLITUS WITH HYPERGLYCEMIA, WITHOUT LONG-TERM CURRENT USE OF INSULIN: ICD-10-CM

## 2024-07-09 DIAGNOSIS — Z00.00 ANNUAL PHYSICAL EXAM: ICD-10-CM

## 2024-07-09 DIAGNOSIS — Z13.29 SCREENING FOR THYROID DISORDER: ICD-10-CM

## 2024-07-09 DIAGNOSIS — E78.5 HYPERLIPIDEMIA, UNSPECIFIED HYPERLIPIDEMIA TYPE: ICD-10-CM

## 2024-07-09 DIAGNOSIS — Z12.5 SCREENING FOR MALIGNANT NEOPLASM OF PROSTATE: ICD-10-CM

## 2024-07-09 LAB
ALBUMIN SERPL-MCNC: 4.1 G/DL (ref 3.5–5.2)
ALBUMIN UR-MCNC: 2.9 MG/DL
ALBUMIN/GLOB SERPL: 1.5 G/DL
ALP SERPL-CCNC: 71 U/L (ref 39–117)
ALT SERPL W P-5'-P-CCNC: 11 U/L (ref 1–41)
ANION GAP SERPL CALCULATED.3IONS-SCNC: 14.4 MMOL/L (ref 5–15)
AST SERPL-CCNC: 16 U/L (ref 1–40)
BASOPHILS # BLD AUTO: 0.03 10*3/MM3 (ref 0–0.2)
BASOPHILS NFR BLD AUTO: 0.4 % (ref 0–1.5)
BILIRUB SERPL-MCNC: 0.6 MG/DL (ref 0–1.2)
BUN SERPL-MCNC: 12 MG/DL (ref 8–23)
BUN/CREAT SERPL: 12.8 (ref 7–25)
CALCIUM SPEC-SCNC: 9.2 MG/DL (ref 8.6–10.5)
CHLORIDE SERPL-SCNC: 100 MMOL/L (ref 98–107)
CHOLEST SERPL-MCNC: 111 MG/DL (ref 0–200)
CO2 SERPL-SCNC: 23.6 MMOL/L (ref 22–29)
CREAT SERPL-MCNC: 0.94 MG/DL (ref 0.76–1.27)
DEPRECATED RDW RBC AUTO: 40.6 FL (ref 37–54)
EGFRCR SERPLBLD CKD-EPI 2021: 91.1 ML/MIN/1.73
EOSINOPHIL # BLD AUTO: 0.59 10*3/MM3 (ref 0–0.4)
EOSINOPHIL NFR BLD AUTO: 7.7 % (ref 0.3–6.2)
ERYTHROCYTE [DISTWIDTH] IN BLOOD BY AUTOMATED COUNT: 12.6 % (ref 12.3–15.4)
GLOBULIN UR ELPH-MCNC: 2.8 GM/DL
GLUCOSE SERPL-MCNC: 172 MG/DL (ref 65–99)
HBA1C MFR BLD: 7.7 % (ref 4.8–5.6)
HCT VFR BLD AUTO: 42.9 % (ref 37.5–51)
HDLC SERPL-MCNC: 30 MG/DL (ref 40–60)
HGB BLD-MCNC: 14.6 G/DL (ref 13–17.7)
IMM GRANULOCYTES # BLD AUTO: 0.01 10*3/MM3 (ref 0–0.05)
IMM GRANULOCYTES NFR BLD AUTO: 0.1 % (ref 0–0.5)
LDLC SERPL CALC-MCNC: 56 MG/DL (ref 0–100)
LDLC/HDLC SERPL: 1.76 {RATIO}
LYMPHOCYTES # BLD AUTO: 2.1 10*3/MM3 (ref 0.7–3.1)
LYMPHOCYTES NFR BLD AUTO: 27.6 % (ref 19.6–45.3)
MCH RBC QN AUTO: 29.7 PG (ref 26.6–33)
MCHC RBC AUTO-ENTMCNC: 34 G/DL (ref 31.5–35.7)
MCV RBC AUTO: 87.2 FL (ref 79–97)
MONOCYTES # BLD AUTO: 0.36 10*3/MM3 (ref 0.1–0.9)
MONOCYTES NFR BLD AUTO: 4.7 % (ref 5–12)
NEUTROPHILS NFR BLD AUTO: 4.53 10*3/MM3 (ref 1.7–7)
NEUTROPHILS NFR BLD AUTO: 59.5 % (ref 42.7–76)
PLATELET # BLD AUTO: 243 10*3/MM3 (ref 140–450)
PMV BLD AUTO: 10 FL (ref 6–12)
POTASSIUM SERPL-SCNC: 4.7 MMOL/L (ref 3.5–5.2)
PROT SERPL-MCNC: 6.9 G/DL (ref 6–8.5)
PSA SERPL-MCNC: 0.72 NG/ML (ref 0–4)
RBC # BLD AUTO: 4.92 10*6/MM3 (ref 4.14–5.8)
SODIUM SERPL-SCNC: 138 MMOL/L (ref 136–145)
TRIGL SERPL-MCNC: 141 MG/DL (ref 0–150)
TSH SERPL DL<=0.05 MIU/L-ACNC: 1.82 UIU/ML (ref 0.27–4.2)
VLDLC SERPL-MCNC: 25 MG/DL (ref 5–40)
WBC NRBC COR # BLD AUTO: 7.62 10*3/MM3 (ref 3.4–10.8)

## 2024-07-09 PROCEDURE — 83036 HEMOGLOBIN GLYCOSYLATED A1C: CPT

## 2024-07-09 PROCEDURE — 36415 COLL VENOUS BLD VENIPUNCTURE: CPT

## 2024-07-09 PROCEDURE — 80050 GENERAL HEALTH PANEL: CPT

## 2024-07-09 PROCEDURE — G0103 PSA SCREENING: HCPCS

## 2024-07-09 PROCEDURE — 80061 LIPID PANEL: CPT

## 2024-07-09 PROCEDURE — 82043 UR ALBUMIN QUANTITATIVE: CPT

## 2024-07-11 DIAGNOSIS — E11.65 TYPE 2 DIABETES MELLITUS WITH HYPERGLYCEMIA, WITHOUT LONG-TERM CURRENT USE OF INSULIN: ICD-10-CM

## 2024-07-11 RX ORDER — SEMAGLUTIDE 0.68 MG/ML
1 INJECTION, SOLUTION SUBCUTANEOUS WEEKLY
Qty: 6 ML | Refills: 2 | Status: SHIPPED | OUTPATIENT
Start: 2024-07-11 | End: 2024-07-12 | Stop reason: DRUGHIGH

## 2024-08-13 ENCOUNTER — PATIENT MESSAGE (OUTPATIENT)
Dept: FAMILY MEDICINE CLINIC | Age: 64
End: 2024-08-13
Payer: COMMERCIAL

## 2024-08-16 RX ORDER — SILDENAFIL 25 MG/1
25 TABLET, FILM COATED ORAL DAILY PRN
Qty: 30 TABLET | Refills: 1 | Status: SHIPPED | OUTPATIENT
Start: 2024-08-16

## 2024-08-16 NOTE — TELEPHONE ENCOUNTER
From: Collin Sandoval  To: Christi Farias  Sent: 8/13/2024 2:49 PM EDT  Subject: Neurotophy    Can you guys prescribe QUTENZA for my neurotrophy?    Tim Sandoval

## 2024-08-23 ENCOUNTER — PATIENT MESSAGE (OUTPATIENT)
Dept: FAMILY MEDICINE CLINIC | Age: 64
End: 2024-08-23
Payer: COMMERCIAL

## 2024-09-24 DIAGNOSIS — J45.20 MILD INTERMITTENT ASTHMA WITHOUT COMPLICATION: ICD-10-CM

## 2024-09-24 DIAGNOSIS — E78.5 HYPERLIPIDEMIA, UNSPECIFIED HYPERLIPIDEMIA TYPE: ICD-10-CM

## 2024-09-24 DIAGNOSIS — I10 PRIMARY HYPERTENSION: ICD-10-CM

## 2024-09-24 RX ORDER — LISINOPRIL 40 MG/1
40 TABLET ORAL DAILY
Qty: 90 TABLET | Refills: 0 | Status: SHIPPED | OUTPATIENT
Start: 2024-09-24

## 2024-09-24 RX ORDER — ATORVASTATIN CALCIUM 20 MG/1
20 TABLET, FILM COATED ORAL DAILY
Qty: 90 TABLET | Refills: 0 | Status: SHIPPED | OUTPATIENT
Start: 2024-09-24

## 2024-09-25 RX ORDER — ALBUTEROL SULFATE 90 UG/1
AEROSOL, METERED RESPIRATORY (INHALATION)
Qty: 18 G | Refills: 0 | Status: SHIPPED | OUTPATIENT
Start: 2024-09-25

## 2024-09-30 ENCOUNTER — PATIENT MESSAGE (OUTPATIENT)
Dept: FAMILY MEDICINE CLINIC | Age: 64
End: 2024-09-30
Payer: COMMERCIAL

## 2024-10-03 DIAGNOSIS — E11.65 TYPE 2 DIABETES MELLITUS WITH HYPERGLYCEMIA, WITHOUT LONG-TERM CURRENT USE OF INSULIN: ICD-10-CM

## 2024-10-03 RX ORDER — GLIPIZIDE 10 MG/1
20 TABLET, FILM COATED, EXTENDED RELEASE ORAL DAILY
Qty: 60 TABLET | Refills: 0 | Status: SHIPPED | OUTPATIENT
Start: 2024-10-03

## 2024-10-04 NOTE — TELEPHONE ENCOUNTER
Pt is currently taking Mounjaro 2.5mg, is ready to go up to Mounjaro 5mg dose, has appointment for 6 month follow up 10/8/24, please advise.

## 2024-10-07 ENCOUNTER — TELEPHONE (OUTPATIENT)
Dept: FAMILY MEDICINE CLINIC | Age: 64
End: 2024-10-07

## 2024-10-07 ENCOUNTER — LAB (OUTPATIENT)
Dept: LAB | Facility: HOSPITAL | Age: 64
End: 2024-10-07
Payer: COMMERCIAL

## 2024-10-07 ENCOUNTER — OFFICE VISIT (OUTPATIENT)
Dept: FAMILY MEDICINE CLINIC | Age: 64
End: 2024-10-07
Payer: COMMERCIAL

## 2024-10-07 VITALS
BODY MASS INDEX: 31.71 KG/M2 | WEIGHT: 255 LBS | SYSTOLIC BLOOD PRESSURE: 120 MMHG | OXYGEN SATURATION: 95 % | HEIGHT: 75 IN | TEMPERATURE: 97.9 F | HEART RATE: 82 BPM | DIASTOLIC BLOOD PRESSURE: 83 MMHG

## 2024-10-07 DIAGNOSIS — Z23 NEED FOR IMMUNIZATION AGAINST INFLUENZA: ICD-10-CM

## 2024-10-07 DIAGNOSIS — E78.5 HYPERLIPIDEMIA, UNSPECIFIED HYPERLIPIDEMIA TYPE: ICD-10-CM

## 2024-10-07 DIAGNOSIS — E11.65 TYPE 2 DIABETES MELLITUS WITH HYPERGLYCEMIA, WITHOUT LONG-TERM CURRENT USE OF INSULIN: ICD-10-CM

## 2024-10-07 DIAGNOSIS — G62.9 PERIPHERAL POLYNEUROPATHY: ICD-10-CM

## 2024-10-07 DIAGNOSIS — E11.65 TYPE 2 DIABETES MELLITUS WITH HYPERGLYCEMIA, WITHOUT LONG-TERM CURRENT USE OF INSULIN: Primary | ICD-10-CM

## 2024-10-07 DIAGNOSIS — F41.9 ANXIETY: ICD-10-CM

## 2024-10-07 DIAGNOSIS — Z79.899 HIGH RISK MEDICATION USE: ICD-10-CM

## 2024-10-07 DIAGNOSIS — I10 PRIMARY HYPERTENSION: ICD-10-CM

## 2024-10-07 DIAGNOSIS — Z23 ENCOUNTER FOR IMMUNIZATION: ICD-10-CM

## 2024-10-07 PROBLEM — J45.20 MILD INTERMITTENT ASTHMA: Status: ACTIVE | Noted: 2020-08-19

## 2024-10-07 LAB
ALBUMIN UR-MCNC: 3.2 MG/DL
AMPHET+METHAMPHET UR QL: NEGATIVE
AMPHETAMINE INTERNAL CONTROL: NORMAL
AMPHETAMINES UR QL: NEGATIVE
BARBITURATE INTERNAL CONTROL: NORMAL
BARBITURATES UR QL SCN: NEGATIVE
BENZODIAZ UR QL SCN: NEGATIVE
BENZODIAZEPINE INTERNAL CONTROL: NORMAL
BUPRENORPHINE INTERNAL CONTROL: NORMAL
BUPRENORPHINE SERPL-MCNC: NEGATIVE NG/ML
CANNABINOIDS SERPL QL: NEGATIVE
COCAINE INTERNAL CONTROL: NORMAL
COCAINE UR QL: NEGATIVE
CREAT UR-MCNC: 114 MG/DL
EXPIRATION DATE: NORMAL
HBA1C MFR BLD: 6.7 % (ref 4.8–5.6)
Lab: NORMAL
MDMA (ECSTASY) INTERNAL CONTROL: NORMAL
MDMA UR QL SCN: NEGATIVE
METHADONE INTERNAL CONTROL: NORMAL
METHADONE UR QL SCN: NEGATIVE
METHAMPHETAMINE INTERNAL CONTROL: NORMAL
MICROALBUMIN/CREAT UR: 28.1 MG/G (ref 0–29)
MORPHINE INTERNAL CONTROL: NORMAL
MORPHINE/OPIATES SCREEN, URINE: NEGATIVE
OXYCODONE INTERNAL CONTROL: NORMAL
OXYCODONE UR QL SCN: NEGATIVE
PCP UR QL SCN: NEGATIVE
PHENCYCLIDINE INTERNAL CONTROL: NORMAL
THC INTERNAL CONTROL: NORMAL

## 2024-10-07 PROCEDURE — 90656 IIV3 VACC NO PRSV 0.5 ML IM: CPT | Performed by: NURSE PRACTITIONER

## 2024-10-07 PROCEDURE — 91320 SARSCV2 VAC 30MCG TRS-SUC IM: CPT | Performed by: NURSE PRACTITIONER

## 2024-10-07 PROCEDURE — 83036 HEMOGLOBIN GLYCOSYLATED A1C: CPT

## 2024-10-07 PROCEDURE — 99214 OFFICE O/P EST MOD 30 MIN: CPT | Performed by: NURSE PRACTITIONER

## 2024-10-07 PROCEDURE — 90471 IMMUNIZATION ADMIN: CPT | Performed by: NURSE PRACTITIONER

## 2024-10-07 PROCEDURE — 90480 ADMN SARSCOV2 VAC 1/ONLY CMP: CPT | Performed by: NURSE PRACTITIONER

## 2024-10-07 PROCEDURE — 82570 ASSAY OF URINE CREATININE: CPT | Performed by: NURSE PRACTITIONER

## 2024-10-07 PROCEDURE — 36415 COLL VENOUS BLD VENIPUNCTURE: CPT

## 2024-10-07 PROCEDURE — 82043 UR ALBUMIN QUANTITATIVE: CPT | Performed by: NURSE PRACTITIONER

## 2024-10-07 RX ORDER — ALPRAZOLAM 1 MG
1 TABLET ORAL DAILY PRN
Qty: 14 TABLET | Refills: 0 | Status: SHIPPED | OUTPATIENT
Start: 2024-10-07

## 2024-10-07 RX ORDER — PREGABALIN 50 MG/1
50 CAPSULE ORAL 2 TIMES DAILY
Qty: 180 CAPSULE | Refills: 0 | Status: SHIPPED | OUTPATIENT
Start: 2024-10-07

## 2024-10-07 NOTE — TELEPHONE ENCOUNTER
----- Message from Olaf Ann sent at 10/7/2024  8:08 AM EDT -----  Need copy orf eye exam from [facility] , about 3 mo ago .

## 2024-10-07 NOTE — PROGRESS NOTES
Chief Complaint  Hypertension (DM, labs , 6 mo f/u, )    Subjective        Collin Sandoval presents to [facility] today for 6-month follow-up on hypertension hyperlipidemia and diabetes.    While he is here, he also needs a refill of his pre-Clavulin Lyrica for his diabetic neuropathy, and his alprazolam for his anxiety.  Urine drug screen today, Alexander reviewed.    Diabetes currently taking Glucotrol 20 mg daily, metformin 1000 mg 2 times daily, and Mounjaro at 5 mg weekly, could not tolerate Ozempic.  Last A1c done 7/9/2024 was down to 7.7.  Had diabetic eye exam done a couple months ago, will get a copy of this for our records.  Diabetic foot exam today.    History of hypertension, taking amlodipine 5 mg daily, hydrochlorothiazide 12.5 daily and lisinopril 40 mg daily with good control of his blood pressure.    History of hyperlipidemia, taking atorvastatin 20 mg nightly, last lipid 7/9/2024, LDL of 56 controlled at goal  of less than 80.      Is due for update on flu vaccine and COVID-vaccine.          Current Outpatient Medications:     acetaminophen (TYLENOL) 500 MG tablet, Take 1 tablet by mouth Every 6 (Six) Hours As Needed for Mild Pain., Disp: 60 tablet, Rfl: 0    albuterol (PROVENTIL) (2.5 MG/3ML) 0.083% nebulizer solution, Take 2.5 mg by nebulization Every 4 (Four) Hours As Needed for Wheezing., Disp: 30 mL, Rfl: 12    albuterol sulfate HFA (Ventolin HFA) 108 (90 Base) MCG/ACT inhaler, INHALE 2 PUFFS INTO THE LUNGS EVERY 4 HOURS AS NEEDED FOR WHEEZING OR SHORTNESS OF AIR, Disp: 18 g, Rfl: 0    ALPRAZolam (XANAX) 1 MG tablet, Take 1 tablet by mouth Daily As Needed for Anxiety., Disp: 14 tablet, Rfl: 0    amLODIPine (NORVASC) 5 MG tablet, Take 1 tablet by mouth Daily., Disp: 90 tablet, Rfl: 1    atorvastatin (LIPITOR) 20 MG tablet, Take 1 tablet by mouth Daily. DUE FOR FOLLOW UP OFFICE VISIT, Disp: 90 tablet, Rfl: 0    citalopram (CeleXA) 40 MG tablet, Take 1 tablet by mouth  "Daily., Disp: 90 tablet, Rfl: 1    glipizide (GLUCOTROL XL) 10 MG 24 hr tablet, TAKE 2 TABLETS BY MOUTH DAILY, Disp: 60 tablet, Rfl: 0    hydroCHLOROthiazide 12.5 MG tablet, Take 1 tablet by mouth Daily., Disp: 90 tablet, Rfl: 1    lisinopril (PRINIVIL,ZESTRIL) 40 MG tablet, Take 1 tablet by mouth Daily. DUE FOR FOLLOW UP OFFICE VISIT, Disp: 90 tablet, Rfl: 0    metFORMIN (GLUCOPHAGE) 1000 MG tablet, Take 1 tablet by mouth 2 (Two) Times a Day., Disp: 180 tablet, Rfl: 1    pregabalin (LYRICA) 50 MG capsule, Take 1 capsule by mouth 2 (Two) Times a Day., Disp: 180 capsule, Rfl: 0    sildenafil (Viagra) 25 MG tablet, Take 1 tablet by mouth Daily As Needed for Erectile Dysfunction. Take 1 hour before sexual activity, Disp: 30 tablet, Rfl: 1    Tirzepatide (MOUNJARO) 5 MG/0.5ML solution pen-injector pen, Inject 0.5 mL under the skin into the appropriate area as directed 1 (One) Time Per Week., Disp: 2 mL, Rfl: 0  Medications Discontinued During This Encounter   Medication Reason    docusate sodium (COLACE) 100 MG capsule *Therapy completed    methylPREDNISolone (MEDROL) 4 MG dose pack *Therapy completed    pregabalin (LYRICA) 25 MG capsule Reorder    ALPRAZolam (XANAX) 1 MG tablet Reorder         Allergies:  Patient has no known allergies.      Objective   Vital Signs:   Vitals:    10/07/24 0750   BP: 120/83   BP Location: Right arm   Patient Position: Sitting   Cuff Size: Large Adult   Pulse: 82   Temp: 97.9 °F (36.6 °C)   TempSrc: Oral   SpO2: 95%   Weight: 116 kg (255 lb)   Height: 190.5 cm (75\")     Body mass index is 31.87 kg/m².           Physical Exam  Constitutional:       Appearance: Normal appearance.   Neck:      Vascular: No carotid bruit.   Cardiovascular:      Rate and Rhythm: Normal rate and regular rhythm.      Pulses:           Dorsalis pedis pulses are 2+ on the right side and 2+ on the left side.      Heart sounds: Normal heart sounds.   Pulmonary:      Effort: Pulmonary effort is normal.      Breath " sounds: Normal breath sounds.   Musculoskeletal:         General: Normal range of motion.   Feet:      Right foot:      Protective Sensation: 9 sites tested.  7 sites sensed.      Skin integrity: Skin integrity normal. No ulcer or blister.      Toenail Condition: Right toenails are normal.      Left foot:      Protective Sensation: 9 sites tested.  8 sites sensed.      Skin integrity: Skin integrity normal. No ulcer or blister.      Toenail Condition: Left toenails are normal.      Comments: Diabetic Foot Exam Performed and Monofilament Test Performed     Skin:     General: Skin is warm and dry.   Neurological:      General: No focal deficit present.      Mental Status: He is alert.   Psychiatric:         Mood and Affect: Mood normal.         Behavior: Behavior normal.             Lab Results   Component Value Date    GLUCOSE 172 (H) 07/09/2024    BUN 12 07/09/2024    CREATININE 0.94 07/09/2024    BCR 12.8 07/09/2024    K 4.7 07/09/2024    CO2 23.6 07/09/2024    CALCIUM 9.2 07/09/2024    ALBUMIN 4.1 07/09/2024    AST 16 07/09/2024    ALT 11 07/09/2024       Lab Results   Component Value Date    CHOL 111 07/09/2024    CHLPL 127 06/08/2021    TRIG 141 07/09/2024    HDL 30 (L) 07/09/2024    LDL 56 07/09/2024       Lab Results   Component Value Date    WBC 7.62 07/09/2024    HGB 14.6 07/09/2024    HCT 42.9 07/09/2024    MCV 87.2 07/09/2024     07/09/2024       Lab Results (last 24 hours)       Procedure Component Value Units Date/Time    POC Medline 12 Panel Urine Drug Screen [[id number]] Collected: 10/07/24 0828    Specimen: Urine Updated: 10/07/24 0843     Amphetamine Screen, Urine Negative     AMP INTERNAL CONTROL Passed     Barbiturates Screen, Urine Negative     BARBITURATE INTERNAL CONTROL Passed     Buprenorphine, Screen, Urine Negative     BUPRENORPHINE INTERNAL CONTROL Passed     Benzodiazepine Screen, Urine Negative     BENZODIAZEPINE INTERNAL CONTROL Passed     Cocaine Screen, Urine Negative     COCAINE  "INTERNAL CONTROL Passed     MDMA (ECSTASY) Negative     MDMA (ECSTASY) INTERNAL CONTROL Passed     Methamphetamine, Ur Negative     METHAMPHETAMINE INTERNAL CONTROL Passed     Morphine/Opiates Screen, Urine Negative     MOR INTERNAL CONTROL Passed     Methadone Screen, Urine Negative     METHADONE INTERNAL CONTROL Passed     Oxycodone Screen, Urine Negative     OXYCODONE INTERNAL CONTROL Passed     Phencyclidine (PCP), Urine Negative     PHENCYCLIDINE INTERNAL CONTROL Passed     THC, Screen, Urine Negative     THC INTERNAL CONTROL Passed     Lot Number [id number]     Expiration Date 5/15/26    Narrative:      LD xanax \"probably over a month\" (per pt) / mcs      Hemoglobin A1c [[id number]] Collected: 10/07/24 0830    Specimen: Blood Updated: 10/07/24 0830    Microalbumin / Creatinine Urine Ratio - Urine, Clean Catch [[id number]] Collected: 10/07/24 0845    Specimen: Urine, Clean Catch Updated: 10/07/24 0849              Procedures         Diagnoses and all orders for this visit:    1. Type 2 diabetes mellitus with hyperglycemia, without long-term current use of insulin (Primary)  -     Hemoglobin A1c; Future  -     Microalbumin / Creatinine Urine Ratio - Urine, Clean Catch; Future  -     Microalbumin / Creatinine Urine Ratio - Urine, Clean Catch    2. Primary hypertension  Comments:  Continue lisinopril, amlodipine and hydrochlorothiazide, blood pressure well-controlled    3. Hyperlipidemia, unspecified hyperlipidemia type  Comments:  Continue atorvastatin 20 mg, LDL controlled    4. Anxiety  -     ALPRAZolam (XANAX) 1 MG tablet; Take 1 tablet by mouth Daily As Needed for Anxiety.  Dispense: 14 tablet; Refill: 0    5. High risk medication use  -     POC Medline 12 Panel Urine Drug Screen    6. Peripheral polyneuropathy  -     pregabalin (LYRICA) 50 MG capsule; Take 1 capsule by mouth 2 (Two) Times a Day.  Dispense: 180 capsule; Refill: 0    7. Need for immunization against influenza  -     Fluzone >6mos    8. Encounter " for immunization  -     COVID-19 (Pfizer) 12yrs+ (COMIRNATY)            Follow Up  Return in about 3 months (around 1/7/2025) for FU with PCP .  Patient was given instructions and counseling regarding his condition or for health maintenance advice. Please see specific information pulled into the AVS if appropriate.           Elmira Ann, APRN  10/07/2024    Please note that portions of this document were completed using a voice recognition program.

## 2024-10-09 DIAGNOSIS — E11.65 TYPE 2 DIABETES MELLITUS WITH HYPERGLYCEMIA, WITHOUT LONG-TERM CURRENT USE OF INSULIN: Primary | ICD-10-CM

## 2024-10-09 NOTE — PROGRESS NOTES
Hemoglobin A1c 6.7 controlled, continue taking medications as currently taking them repeat in 3 months. Xeljanz Counseling: I discussed with the patient the risks of Xeljanz therapy including increased risk of infection, liver issues, headache, diarrhea, or cold symptoms. Live vaccines should be avoided. They were instructed to call if they have any problems.

## 2024-10-10 DIAGNOSIS — F41.9 ANXIETY: ICD-10-CM

## 2024-10-10 RX ORDER — CITALOPRAM HYDROBROMIDE 40 MG/1
40 TABLET ORAL DAILY
Qty: 90 TABLET | Refills: 1 | Status: SHIPPED | OUTPATIENT
Start: 2024-10-10

## 2024-10-16 ENCOUNTER — TELEPHONE (OUTPATIENT)
Dept: FAMILY MEDICINE CLINIC | Age: 64
End: 2024-10-16
Payer: COMMERCIAL

## 2024-10-16 NOTE — TELEPHONE ENCOUNTER
----- Message from Olaf Ann sent at 10/16/2024 12:35 PM EDT -----  This is 8/20/2022, please let the patient know he is due for diabetic eye exam.  ----- Message -----  From: Sabine Soto LPN  Sent: 10/15/2024   3:27 PM EDT  To: BILL Shelby

## 2024-10-22 DIAGNOSIS — I10 PRIMARY HYPERTENSION: ICD-10-CM

## 2024-10-22 RX ORDER — HYDROCHLOROTHIAZIDE 12.5 MG/1
12.5 TABLET ORAL DAILY
Qty: 90 TABLET | Refills: 1 | Status: SHIPPED | OUTPATIENT
Start: 2024-10-22

## 2024-10-24 NOTE — TELEPHONE ENCOUNTER
Rx Refill Note  Requested Prescriptions     Pending Prescriptions Disp Refills    Tirzepatide (MOUNJARO) 5 MG/0.5ML solution pen-injector pen 2 mL 0     Sig: Inject 0.5 mL under the skin into the appropriate area as directed 1 (One) Time Per Week.      Last office visit with prescribing clinician: 4/4/2024   Last telemedicine visit with prescribing clinician: Visit date not found   Next office visit with prescribing clinician: Visit date not found       Sabine Soto LPN  10/24/24, 09:40 EDT    PT ASKING FOR INCREASE TO 7.5MG    Hemoglobin A1c (10/07/2024 08:30)     A1C IMPROVED SINCE BEING ON MED     ON CALL FOR BREANNE, PLEASE ADV

## 2024-11-12 DIAGNOSIS — J45.20 MILD INTERMITTENT ASTHMA WITHOUT COMPLICATION: ICD-10-CM

## 2024-11-12 RX ORDER — ALBUTEROL SULFATE 90 UG/1
AEROSOL, METERED RESPIRATORY (INHALATION)
Qty: 18 G | Refills: 0 | Status: SHIPPED | OUTPATIENT
Start: 2024-11-12

## 2024-11-19 ENCOUNTER — PATIENT MESSAGE (OUTPATIENT)
Dept: FAMILY MEDICINE CLINIC | Age: 64
End: 2024-11-19
Payer: COMMERCIAL

## 2024-11-19 NOTE — TELEPHONE ENCOUNTER
Rx Refill Note  Requested Prescriptions     Pending Prescriptions Disp Refills    Tirzepatide 5 MG/0.5ML solution auto-injector 2 mL 0     Sig: Inject 0.5 mL under the skin into the appropriate area as directed 1 (One) Time Per Week.      Last office visit with prescribing clinician: 4/4/2024   Last telemedicine visit with prescribing clinician: Visit date not found   Next office visit with prescribing clinician: Visit date not found   \    Sabine Soto LPN  11/19/24, 09:59 EST    LF BY ON CALL 10/24/24    LOV-10/7/24 BY TUTU    Hemoglobin A1c (10/07/2024 08:30)

## 2024-12-05 DIAGNOSIS — F41.9 ANXIETY: ICD-10-CM

## 2024-12-05 DIAGNOSIS — J45.20 MILD INTERMITTENT ASTHMA WITHOUT COMPLICATION: ICD-10-CM

## 2024-12-05 DIAGNOSIS — E11.65 TYPE 2 DIABETES MELLITUS WITH HYPERGLYCEMIA, WITHOUT LONG-TERM CURRENT USE OF INSULIN: ICD-10-CM

## 2024-12-05 RX ORDER — ALPRAZOLAM 1 MG/1
1 TABLET ORAL DAILY PRN
Qty: 14 TABLET | Refills: 0 | Status: CANCELLED | OUTPATIENT
Start: 2024-12-05

## 2024-12-05 NOTE — TELEPHONE ENCOUNTER
LV:10/7/24  LF:10/7/24 #14  Tox:10/7/24    Pt states he will call back to schedule his appointment

## 2024-12-06 RX ORDER — ALBUTEROL SULFATE 90 UG/1
AEROSOL, METERED RESPIRATORY (INHALATION)
Qty: 18 G | Refills: 0 | Status: SHIPPED | OUTPATIENT
Start: 2024-12-06

## 2024-12-06 RX ORDER — GLIPIZIDE 10 MG/1
20 TABLET, FILM COATED, EXTENDED RELEASE ORAL DAILY
Qty: 60 TABLET | Refills: 0 | Status: SHIPPED | OUTPATIENT
Start: 2024-12-06

## 2024-12-06 NOTE — TELEPHONE ENCOUNTER
Glipizide Kate sent in.  Olaf filled Xanax 2 months ago.  Previously it took him 6 months to use 14 pills.  Can you see why they increase?  He understands that I do not like use of this medication long term due to risk of dependency.  Can discuss further at his appointment in January if needed

## 2024-12-06 NOTE — TELEPHONE ENCOUNTER
Pt states he just thought he could refill all his meds together, is not out of medication, states he can wait a while before getting them refilled.

## 2024-12-24 DIAGNOSIS — E78.5 HYPERLIPIDEMIA, UNSPECIFIED HYPERLIPIDEMIA TYPE: ICD-10-CM

## 2024-12-24 DIAGNOSIS — I10 PRIMARY HYPERTENSION: ICD-10-CM

## 2024-12-26 DIAGNOSIS — I10 PRIMARY HYPERTENSION: ICD-10-CM

## 2024-12-26 DIAGNOSIS — E78.5 HYPERLIPIDEMIA, UNSPECIFIED HYPERLIPIDEMIA TYPE: ICD-10-CM

## 2024-12-26 RX ORDER — LISINOPRIL 40 MG/1
TABLET ORAL
Qty: 30 TABLET | Refills: 0 | Status: SHIPPED | OUTPATIENT
Start: 2024-12-26

## 2024-12-26 RX ORDER — ATORVASTATIN CALCIUM 20 MG/1
TABLET, FILM COATED ORAL
Qty: 30 TABLET | Refills: 0 | Status: SHIPPED | OUTPATIENT
Start: 2024-12-26

## 2024-12-31 DIAGNOSIS — E11.65 TYPE 2 DIABETES MELLITUS WITH HYPERGLYCEMIA, WITHOUT LONG-TERM CURRENT USE OF INSULIN: ICD-10-CM

## 2025-01-02 DIAGNOSIS — E11.65 TYPE 2 DIABETES MELLITUS WITH HYPERGLYCEMIA, WITHOUT LONG-TERM CURRENT USE OF INSULIN: ICD-10-CM

## 2025-01-02 DIAGNOSIS — G62.9 PERIPHERAL POLYNEUROPATHY: ICD-10-CM

## 2025-01-02 RX ORDER — PREGABALIN 50 MG/1
50 CAPSULE ORAL 2 TIMES DAILY
Qty: 60 CAPSULE | Refills: 0 | Status: CANCELLED | OUTPATIENT
Start: 2025-01-02

## 2025-01-02 RX ORDER — PREGABALIN 50 MG/1
50 CAPSULE ORAL 2 TIMES DAILY
Qty: 1 CAPSULE | Refills: 0 | Status: SHIPPED | OUTPATIENT
Start: 2025-01-02

## 2025-01-02 RX ORDER — GLIPIZIDE 10 MG/1
20 TABLET, FILM COATED, EXTENDED RELEASE ORAL DAILY
Qty: 60 TABLET | Refills: 0 | Status: SHIPPED | OUTPATIENT
Start: 2025-01-02

## 2025-01-02 NOTE — TELEPHONE ENCOUNTER
Rx Refill Note  Requested Prescriptions             Pending Prescriptions Disp Refills    pregabalin (LYRICA) 50 MG capsule 180 capsule 0       Sig: Take 1 capsule by mouth 2 (Two) Times a Day.      Last office visit with prescribing clinician: 4/4/2024   Last telemedicine visit with prescribing clinician: Visit date not found   Next office visit with prescribing clinician: 1/20/2025         Sabine Soto LPN  01/02/25, 10:53 EST     LF-10/7/24 #180, RF-0  UDS-POC Medline 12 Panel Urine Drug Screen (10/07/2024 08:28)         SAW RON FOR DM 10/7/24

## 2025-01-20 ENCOUNTER — OFFICE VISIT (OUTPATIENT)
Dept: FAMILY MEDICINE CLINIC | Age: 65
End: 2025-01-20
Payer: COMMERCIAL

## 2025-01-20 VITALS
HEART RATE: 88 BPM | HEIGHT: 75 IN | OXYGEN SATURATION: 96 % | DIASTOLIC BLOOD PRESSURE: 88 MMHG | SYSTOLIC BLOOD PRESSURE: 121 MMHG | BODY MASS INDEX: 32.63 KG/M2 | WEIGHT: 262.4 LBS

## 2025-01-20 DIAGNOSIS — G62.9 PERIPHERAL POLYNEUROPATHY: ICD-10-CM

## 2025-01-20 DIAGNOSIS — Z79.899 HIGH RISK MEDICATION USE: ICD-10-CM

## 2025-01-20 DIAGNOSIS — I10 PRIMARY HYPERTENSION: ICD-10-CM

## 2025-01-20 DIAGNOSIS — F41.9 ANXIETY: ICD-10-CM

## 2025-01-20 DIAGNOSIS — E78.5 HYPERLIPIDEMIA, UNSPECIFIED HYPERLIPIDEMIA TYPE: ICD-10-CM

## 2025-01-20 DIAGNOSIS — E11.42 TYPE 2 DIABETES MELLITUS WITH DIABETIC POLYNEUROPATHY, WITHOUT LONG-TERM CURRENT USE OF INSULIN: Primary | ICD-10-CM

## 2025-01-20 DIAGNOSIS — J45.20 MILD INTERMITTENT ASTHMA WITHOUT COMPLICATION: ICD-10-CM

## 2025-01-20 DIAGNOSIS — E66.9 OBESITY (BMI 30-39.9): ICD-10-CM

## 2025-01-20 LAB
AMPHET+METHAMPHET UR QL: NEGATIVE
AMPHETAMINE INTERNAL CONTROL: NORMAL
AMPHETAMINES UR QL: NEGATIVE
BARBITURATE INTERNAL CONTROL: NORMAL
BARBITURATES UR QL SCN: NEGATIVE
BENZODIAZ UR QL SCN: NEGATIVE
BENZODIAZEPINE INTERNAL CONTROL: NORMAL
BUPRENORPHINE INTERNAL CONTROL: NORMAL
BUPRENORPHINE SERPL-MCNC: NEGATIVE NG/ML
CANNABINOIDS SERPL QL: NEGATIVE
COCAINE INTERNAL CONTROL: NORMAL
COCAINE UR QL: NEGATIVE
EXPIRATION DATE: ABNORMAL
EXPIRATION DATE: NORMAL
HBA1C MFR BLD: 7.6 % (ref 4.5–5.7)
Lab: ABNORMAL
Lab: NORMAL
MDMA (ECSTASY) INTERNAL CONTROL: NORMAL
MDMA UR QL SCN: NEGATIVE
METHADONE INTERNAL CONTROL: NORMAL
METHADONE UR QL SCN: NEGATIVE
METHAMPHETAMINE INTERNAL CONTROL: NORMAL
MORPHINE INTERNAL CONTROL: NORMAL
MORPHINE/OPIATES SCREEN, URINE: NEGATIVE
OXYCODONE INTERNAL CONTROL: NORMAL
OXYCODONE UR QL SCN: NEGATIVE
PCP UR QL SCN: NEGATIVE
PHENCYCLIDINE INTERNAL CONTROL: NORMAL
THC INTERNAL CONTROL: NORMAL

## 2025-01-20 PROCEDURE — 83036 HEMOGLOBIN GLYCOSYLATED A1C: CPT | Performed by: NURSE PRACTITIONER

## 2025-01-20 PROCEDURE — 99214 OFFICE O/P EST MOD 30 MIN: CPT | Performed by: NURSE PRACTITIONER

## 2025-01-20 RX ORDER — LISINOPRIL 40 MG/1
40 TABLET ORAL DAILY
Qty: 90 TABLET | OUTPATIENT
Start: 2025-01-20

## 2025-01-20 RX ORDER — ALPRAZOLAM 1 MG/1
1 TABLET ORAL DAILY PRN
Qty: 14 TABLET | Refills: 0 | Status: SHIPPED | OUTPATIENT
Start: 2025-01-20

## 2025-01-20 RX ORDER — ATORVASTATIN CALCIUM 20 MG/1
20 TABLET, FILM COATED ORAL NIGHTLY
Qty: 90 TABLET | Refills: 1 | Status: SHIPPED | OUTPATIENT
Start: 2025-01-20

## 2025-01-20 RX ORDER — ATORVASTATIN CALCIUM 20 MG/1
TABLET, FILM COATED ORAL
Qty: 90 TABLET | OUTPATIENT
Start: 2025-01-20

## 2025-01-20 RX ORDER — AMLODIPINE BESYLATE 5 MG/1
5 TABLET ORAL DAILY
Qty: 90 TABLET | Refills: 1 | Status: SHIPPED | OUTPATIENT
Start: 2025-01-20

## 2025-01-20 RX ORDER — GLIPIZIDE 10 MG/1
20 TABLET, FILM COATED, EXTENDED RELEASE ORAL DAILY
Qty: 180 TABLET | Refills: 1 | Status: SHIPPED | OUTPATIENT
Start: 2025-01-20

## 2025-01-20 RX ORDER — LISINOPRIL 40 MG/1
40 TABLET ORAL DAILY
Qty: 90 TABLET | Refills: 1 | Status: SHIPPED | OUTPATIENT
Start: 2025-01-20

## 2025-01-20 NOTE — ASSESSMENT & PLAN NOTE
Due for lipid panel when he returns in July, lipids have been very well-controlled in the past, he will continue atorvastatin 20 mg for now

## 2025-01-20 NOTE — PROGRESS NOTES
"Collin Sandoval presents to [facility] with complaint of  Hypertension (Follow up from when he seen Olaf. )    SUBJECTIVE  History of Present Illness    Patient is here for follow-up of chronic medical conditions of hypertension, type 2 diabetes, anxiety, hyperlipidemia, and neuropathy.  He saw Olaf KHAN in Oct for follow up.     He is on lisinopril 40 mg amlodipine 5 mg HCTZ 12.5 mg for HTN. His blood pressure is controlled.      Celexa 40 mg for ANN, uses 14 tabs of xanax in a 6 month period.  On lyrica for neuropathy. BG at home runs 100s.  He is currently on Mounjaro 7.5 mg weekly.  His last A1c was 6.7 3 months ago .  He tolerates the Mounjaro well.  Patient says that he feels row[place] and does not feel like eating as much as he was. Unfortuntatley, patient has gained 7 lbs in the past 3 months.  He has no issues or concerns he wishes to address today.        OBJECTIVE  Vital Signs:   /88 (BP Location: Right arm, Patient Position: Sitting, Cuff Size: Large Adult)   Pulse 88   Ht 190.5 cm (75\")   Wt 119 kg (262 lb 6.4 oz)   SpO2 96%   BMI 32.80 kg/m²       Physical Exam  Constitutional:       General: He is not in acute distress.     Appearance: Normal appearance. He is not ill-appearing.   HENT:      Head: Normocephalic and atraumatic.      Nose: Nose normal.      Mouth/Throat:      Pharynx: Oropharynx is clear.   Cardiovascular:      Rate and Rhythm: Normal rate and regular rhythm.      Pulses: Normal pulses.      Heart sounds: Normal heart sounds.   Pulmonary:      Effort: Pulmonary effort is normal. No respiratory distress.      Breath sounds: Normal breath sounds.   Chest:      Chest wall: No tenderness.   Musculoskeletal:         General: Normal range of motion.      Cervical back: Normal range of motion and neck supple.   Skin:     General: Skin is warm and dry.   Neurological:      General: No focal deficit present.      Mental Status: He is alert " and oriented to person, place, and time. Mental status is at baseline.   Psychiatric:         Mood and Affect: Mood normal.         Behavior: Behavior normal.          Results Review:  The following data was reviewed by BILL Baker [unfilled] 08:15 EST.      Office Visit on 01/20/2025   Component Date Value Ref Range Status    Hemoglobin A1C 01/20/2025 7.6 (A)  4.5 - 5.7 % Final    Lot Number 01/20/2025 [id number]   Final    Expiration Date 01/20/2025 08/31/2026   Final    Amphetamine Screen, Urine 01/20/2025 Negative  Negative Final    AMP INTERNAL CONTROL 01/20/2025 Passed  Passed Final    Barbiturates Screen, Urine 01/20/2025 Negative  Negative Final    BARBITURATE INTERNAL CONTROL 01/20/2025 Passed  Passed Final    Buprenorphine, Screen, Urine 01/20/2025 Negative  Negative Final    BUPRENORPHINE INTERNAL CONTROL 01/20/2025 Passed  Passed Final    Benzodiazepine Screen, Urine 01/20/2025 Negative  Negative Final    BENZODIAZEPINE INTERNAL CONTROL 01/20/2025 Passed  Passed Final    Cocaine Screen, Urine 01/20/2025 Negative  Negative Final    COCAINE INTERNAL CONTROL 01/20/2025 Passed  Passed Final    MDMA (ECSTASY) 01/20/2025 Negative  Negative Final    MDMA (ECSTASY) INTERNAL CONTROL 01/20/2025 Passed  Passed Final    Methamphetamine, Ur 01/20/2025 Negative  Negative Final    METHAMPHETAMINE INTERNAL CONTROL 01/20/2025 Passed  Passed Final    Morphine/Opiates Screen, Urine 01/20/2025 Negative  Negative Final    MOR INTERNAL CONTROL 01/20/2025 Passed  Passed Final    Methadone Screen, Urine 01/20/2025 Negative  Negative Final    METHADONE INTERNAL CONTROL 01/20/2025 Passed  Passed Final    Oxycodone Screen, Urine 01/20/2025 Negative  Negative Final    OXYCODONE INTERNAL CONTROL 01/20/2025 Passed  Passed Final    Phencyclidine (PCP), Urine 01/20/2025 Negative  Negative Final    PHENCYCLIDINE INTERNAL CONTROL 01/20/2025 Passed  Passed Final    THC, Screen, Urine 01/20/2025 Negative  Negative Final    THC INTERNAL  CONTROL 01/20/2025 Passed  Passed Final    Lot Number 01/20/2025 [id number]   Final    Expiration Date 01/20/2025 09/12/26   Final         ASSESSMENT AND PLAN:  Diagnoses and all orders for this visit:    1. Type 2 diabetes mellitus with diabetic polyneuropathy, without long-term current use of insulin (Primary)  Assessment & Plan:  A1c unfortunately did increase to 7.6.he was given option to increase Mounjaro or continue Mounjaro 7.5 mg.  Patient would like to stay at the 7.5 mg weekly dose.  Continue metformin 1000 mg twice per day and glipizide 20 mg daily.  He does admit to eating sweets daily, discussed that he could try to eat just a few times a week instead of daily.  A1c will be reassessed in 6 months.    Orders:  -     POC Glycosylated Hemoglobin (Hb A1C)  -     glipizide (GLUCOTROL XL) 10 MG 24 hr tablet; Take 2 tablets by mouth Daily.  Dispense: 180 tablet; Refill: 1    2. Primary hypertension  Assessment & Plan:  Blood pressure is very well-controlled, continue amlodipine 5 mg daily, HCTZ 12.5 mg daily, lisinopril 40 mg daily    Orders:  -     amLODIPine (NORVASC) 5 MG tablet; Take 1 tablet by mouth Daily.  Dispense: 90 tablet; Refill: 1  -     lisinopril (PRINIVIL,ZESTRIL) 40 MG tablet; Take 1 tablet by mouth Daily.  Dispense: 90 tablet; Refill: 1    3. Peripheral polyneuropathy  Assessment & Plan:  Stable with Lyrica 50 mg twice per day      4. Hyperlipidemia, unspecified hyperlipidemia type  Assessment & Plan:  Due for lipid panel when he returns in July, lipids have been very well-controlled in the past, he will continue atorvastatin 20 mg for now    Orders:  -     atorvastatin (LIPITOR) 20 MG tablet; Take 1 tablet by mouth Every Night.  Dispense: 90 tablet; Refill: 1    5. Anxiety  Assessment & Plan:  Controlled with Celexa, as needed Xanax-he uses very sparingly and is aware of risk of addiction of this drug class    Orders:  -     Cancel: Urine Drug Screen - Urine, Clean Catch; Future  -      ALPRAZolam (XANAX) 1 MG tablet; Take 1 tablet by mouth Daily As Needed for Anxiety.  Dispense: 14 tablet; Refill: 0    6. Obesity (BMI 30-39.9)  Assessment & Plan:  Unfortunately he has gained weight, explained that Mounjaro can cause plateau, increase exercise and watch portion sizes      7. Mild intermittent asthma without complication  Assessment & Plan:  Controlled with albuterol as needed, he does not require maintenance inhaler          8. High risk medication use  Comments:  Urine drug screen and Alexander appropriate, uses Xanax sparingly, refill provided  Orders:  -     POC 12 Panel Urine Drug Screen                  Follow Up   Return in about 6 months (around 7/20/2025) for Annual physical. Patient to notify office with any acute concerns or issues.  Patient verbalizes understanding, agrees with plan of care and has no further questions upon discharge.     Patient was given instructions and counseling regarding his condition or for health maintenance advice. Please see specific information pulled into the AVS if appropriate.     Discussed the importance of following up with any needed screening tests/labs/specialist appointments and any requested follow-up recommended by me today. Importance of maintaining follow-up discussed and patient accepts that missed appointments can delay diagnosis and potentially lead to worsening of conditions.    Part of this note may be an electronic transcription/translation of spoken language to printed text using the Dragon Dictation System.

## 2025-01-20 NOTE — ASSESSMENT & PLAN NOTE
Blood pressure is very well-controlled, continue amlodipine 5 mg daily, HCTZ 12.5 mg daily, lisinopril 40 mg daily

## 2025-01-20 NOTE — ASSESSMENT & PLAN NOTE
A1c unfortunately did increase to 7.6.he was given option to increase Mounjaro or continue Mounjaro 7.5 mg.  Patient would like to stay at the 7.5 mg weekly dose.  Continue metformin 1000 mg twice per day and glipizide 20 mg daily.  He does admit to eating sweets daily, discussed that he could try to eat just a few times a week instead of daily.  A1c will be reassessed in 6 months.

## 2025-01-20 NOTE — ASSESSMENT & PLAN NOTE
Unfortunately he has gained weight, explained that Mounjaro can cause plateau, increase exercise and watch portion sizes

## 2025-01-20 NOTE — ASSESSMENT & PLAN NOTE
Controlled with Celexa, as needed Xanax-he uses very sparingly and is aware of risk of addiction of this drug class

## 2025-01-21 DIAGNOSIS — I10 PRIMARY HYPERTENSION: ICD-10-CM

## 2025-01-21 DIAGNOSIS — E78.5 HYPERLIPIDEMIA, UNSPECIFIED HYPERLIPIDEMIA TYPE: ICD-10-CM

## 2025-01-21 RX ORDER — LISINOPRIL 40 MG/1
40 TABLET ORAL DAILY
Qty: 90 TABLET | Refills: 1 | OUTPATIENT
Start: 2025-01-21

## 2025-01-21 RX ORDER — ATORVASTATIN CALCIUM 20 MG/1
TABLET, FILM COATED ORAL
Qty: 30 TABLET | OUTPATIENT
Start: 2025-01-21

## 2025-02-19 DIAGNOSIS — I10 PRIMARY HYPERTENSION: ICD-10-CM

## 2025-02-19 RX ORDER — AMLODIPINE BESYLATE 5 MG/1
5 TABLET ORAL DAILY
Qty: 90 TABLET | Refills: 1 | OUTPATIENT
Start: 2025-02-19

## 2025-03-18 ENCOUNTER — TELEPHONE (OUTPATIENT)
Dept: FAMILY MEDICINE CLINIC | Age: 65
End: 2025-03-18
Payer: COMMERCIAL

## 2025-03-18 NOTE — TELEPHONE ENCOUNTER
hub to relay- pt called to establish with another provider due to [facility] leaving in May, VM left, ok to schedule with next available that works with their schedule 3/18 TD*

## 2025-03-20 RX ORDER — TIRZEPATIDE 7.5 MG/.5ML
INJECTION, SOLUTION SUBCUTANEOUS
Qty: 6 ML | Refills: 1 | Status: SHIPPED | OUTPATIENT
Start: 2025-03-20

## 2025-03-20 NOTE — TELEPHONE ENCOUNTER
Refill sent to [facility], please call patient and schedule appointment with new provider for July. He has option of following me to [facility] if he likes, please provide him with that information if he prefers. Thanks

## 2025-03-20 NOTE — TELEPHONE ENCOUNTER
Rx Refill Note  Requested Prescriptions     Pending Prescriptions Disp Refills    Mounjaro 7.5 MG/0.5ML solution auto-injector [Pharmacy Med Name: MOUNJARO 7.5MG/0.5ML INJ (4 PENS)] 6 mL 1     Sig: ADMINISTER 7.5 MG UNDER THE SKIN 1 TIME EVERY WEEK AS DIRECTED      Last office visit with prescribing clinician: 1/20/2025   Last telemedicine visit with prescribing clinician: Visit date not found   Next office visit with prescribing clinician: Visit date not found       Sabine Soto LPN  03/20/25, 10:17 EDT    -11/19/24 #6, RF-1    Hemoglobin A1c (10/07/2024 08:30)

## 2025-04-14 DIAGNOSIS — J45.20 MILD INTERMITTENT ASTHMA WITHOUT COMPLICATION: ICD-10-CM

## 2025-04-15 DIAGNOSIS — I10 PRIMARY HYPERTENSION: ICD-10-CM

## 2025-04-15 RX ORDER — HYDROCHLOROTHIAZIDE 12.5 MG/1
12.5 TABLET ORAL DAILY
Qty: 90 TABLET | Refills: 0 | Status: SHIPPED | OUTPATIENT
Start: 2025-04-15 | End: 2025-04-16 | Stop reason: SDUPTHER

## 2025-04-15 RX ORDER — ALBUTEROL SULFATE 90 UG/1
INHALANT RESPIRATORY (INHALATION)
Qty: 18 G | Refills: 0 | Status: SHIPPED | OUTPATIENT
Start: 2025-04-15

## 2025-04-16 ENCOUNTER — OFFICE VISIT (OUTPATIENT)
Dept: FAMILY MEDICINE CLINIC | Age: 65
End: 2025-04-16
Payer: COMMERCIAL

## 2025-04-16 VITALS
TEMPERATURE: 98.4 F | DIASTOLIC BLOOD PRESSURE: 84 MMHG | WEIGHT: 260 LBS | SYSTOLIC BLOOD PRESSURE: 130 MMHG | BODY MASS INDEX: 32.33 KG/M2 | HEART RATE: 71 BPM | OXYGEN SATURATION: 96 % | HEIGHT: 75 IN

## 2025-04-16 DIAGNOSIS — E11.42 TYPE 2 DIABETES MELLITUS WITH DIABETIC POLYNEUROPATHY, WITHOUT LONG-TERM CURRENT USE OF INSULIN: ICD-10-CM

## 2025-04-16 DIAGNOSIS — Z00.00 ANNUAL PHYSICAL EXAM: Primary | ICD-10-CM

## 2025-04-16 DIAGNOSIS — F43.23 ACUTE ADJUSTMENT DISORDER WITH MIXED ANXIETY AND DEPRESSED MOOD: ICD-10-CM

## 2025-04-16 DIAGNOSIS — Z79.899 HIGH RISK MEDICATION USE: ICD-10-CM

## 2025-04-16 DIAGNOSIS — E78.5 HYPERLIPIDEMIA, UNSPECIFIED HYPERLIPIDEMIA TYPE: ICD-10-CM

## 2025-04-16 DIAGNOSIS — E11.65 TYPE 2 DIABETES MELLITUS WITH HYPERGLYCEMIA, WITHOUT LONG-TERM CURRENT USE OF INSULIN: ICD-10-CM

## 2025-04-16 DIAGNOSIS — G62.9 PERIPHERAL POLYNEUROPATHY: ICD-10-CM

## 2025-04-16 DIAGNOSIS — Z12.5 SCREENING PSA (PROSTATE SPECIFIC ANTIGEN): ICD-10-CM

## 2025-04-16 DIAGNOSIS — I10 PRIMARY HYPERTENSION: ICD-10-CM

## 2025-04-16 LAB
AMPHET+METHAMPHET UR QL: NEGATIVE
AMPHETAMINES UR QL: NEGATIVE
BARBITURATES UR QL SCN: NEGATIVE
BENZODIAZ UR QL SCN: NEGATIVE
BUPRENORPHINE SERPL-MCNC: NEGATIVE NG/ML
CANNABINOIDS SERPL QL: NEGATIVE
COCAINE UR QL: NEGATIVE
EXPIRATION DATE: NORMAL
Lab: NORMAL
MDMA UR QL SCN: NEGATIVE
METHADONE UR QL SCN: NEGATIVE
MORPHINE/OPIATES SCREEN, URINE: NEGATIVE
OXYCODONE UR QL SCN: NEGATIVE
PCP UR QL SCN: NEGATIVE

## 2025-04-16 PROCEDURE — 82570 ASSAY OF URINE CREATININE: CPT | Performed by: NURSE PRACTITIONER

## 2025-04-16 PROCEDURE — 82043 UR ALBUMIN QUANTITATIVE: CPT | Performed by: NURSE PRACTITIONER

## 2025-04-16 RX ORDER — PREGABALIN 75 MG/1
75 CAPSULE ORAL 3 TIMES DAILY
Qty: 270 CAPSULE | Refills: 1 | Status: SHIPPED | OUTPATIENT
Start: 2025-04-16 | End: 2025-04-16

## 2025-04-16 RX ORDER — LISINOPRIL 40 MG/1
40 TABLET ORAL DAILY
Qty: 90 TABLET | Refills: 1 | Status: SHIPPED | OUTPATIENT
Start: 2025-04-16

## 2025-04-16 RX ORDER — PREGABALIN 75 MG/1
75 CAPSULE ORAL 3 TIMES DAILY
Qty: 270 CAPSULE | Refills: 1 | Status: SHIPPED | OUTPATIENT
Start: 2025-04-16

## 2025-04-16 RX ORDER — HYDROCHLOROTHIAZIDE 12.5 MG/1
12.5 TABLET ORAL DAILY
Qty: 90 TABLET | Refills: 0 | Status: SHIPPED | OUTPATIENT
Start: 2025-04-16

## 2025-04-16 RX ORDER — ATORVASTATIN CALCIUM 20 MG/1
20 TABLET, FILM COATED ORAL NIGHTLY
Qty: 90 TABLET | Refills: 1 | Status: SHIPPED | OUTPATIENT
Start: 2025-04-16

## 2025-04-16 RX ORDER — PREGABALIN 75 MG/1
75 CAPSULE ORAL 3 TIMES DAILY
Qty: 270 CAPSULE | Refills: 1 | Status: SHIPPED | OUTPATIENT
Start: 2025-04-16 | End: 2025-04-16 | Stop reason: SDUPTHER

## 2025-04-16 RX ORDER — TIRZEPATIDE 7.5 MG/.5ML
7.5 INJECTION, SOLUTION SUBCUTANEOUS WEEKLY
Qty: 6 ML | Refills: 1 | Status: SHIPPED | OUTPATIENT
Start: 2025-04-16

## 2025-04-16 RX ORDER — GLIPIZIDE 10 MG/1
20 TABLET, FILM COATED, EXTENDED RELEASE ORAL DAILY
Qty: 180 TABLET | Refills: 1 | Status: SHIPPED | OUTPATIENT
Start: 2025-04-16

## 2025-04-16 RX ORDER — AMLODIPINE BESYLATE 5 MG/1
5 TABLET ORAL DAILY
Qty: 90 TABLET | Refills: 1 | Status: SHIPPED | OUTPATIENT
Start: 2025-04-16

## 2025-04-16 RX ORDER — ALPRAZOLAM 1 MG/1
1 TABLET ORAL DAILY PRN
Qty: 14 TABLET | Refills: 2 | Status: SHIPPED | OUTPATIENT
Start: 2025-04-16

## 2025-04-16 RX ORDER — CITALOPRAM HYDROBROMIDE 40 MG/1
40 TABLET ORAL DAILY
Qty: 90 TABLET | Refills: 1 | Status: SHIPPED | OUTPATIENT
Start: 2025-04-16

## 2025-04-16 NOTE — PROGRESS NOTES
Chief Complaint  Annual Exam (Physical, med review)    Subjective          Collin Sandoval presents to [facility]    History of Present Illness  The patient is a 64-year-old male who presents to establish care with a new primary care provider, as his previous provider is leaving the practice. He is here for his annual exam.    Hypertension has been managed for approximately 20 years, currently controlled with amlodipine 5 mg daily and lisinopril 40 mg daily. Uncertainty about hydrochlorothiazide 12.5 mg intake is expressed, and a refill is required. Refills for amlodipine and lisinopril prescriptions are also needed. Does not think he has been taking the HCTZ and his DBP has been higher.     Cholesterol management has been ongoing with atorvastatin 20 mg for over 20 years. The necessity of continued use is questioned , encouraged to take for life.     Diabetes was diagnosed about 5 years ago. The most recent A1c level was 7.6, a slight increase from previous readings in the 6 range. Normal kidney function is reported. A refill of the Mounjaro prescription is sought. The current regimen includes glipizide 20 mg daily, metformin 1000 mg twice daily, and Mounjaro 7.5 mg once weekly.    Citalopram 40 mg daily is taken for anxiety and mood management. A refill of alprazolam is requested, which is used sparingly to manage work-related stress.    Pregabalin 75 mg was initially taken once or twice daily but is now taken three times daily due to inadequate symptom control. Mild numbness in the feet is reported, but good sensation is retained. Improvement in condition since starting the medication is noted, although it is not significant. No drowsiness is experienced as a side effect. A diabetic foot exam was performed last month by the podiatrist, who indicated that the feet look good, and originally ordered the Lyrica       The tetanus and shingles vaccines have not been received. Order sent  to pharmacy for shingles vaccine     SOCIAL HISTORY  He does not smoke. He drinks very little alcohol.    FAMILY HISTORY  His father  of a heart attack.      History of Present Illness      Past Medical History:   Diagnosis Date    Arthritis     Arthritis of shoulder 2022    Asthma     Colon polyps     Diabetes     Morbid (severe) obesity due to excess calories 2022       No Known Allergies     Past Surgical History:   Procedure Laterality Date    COLONOSCOPY      JOINT REPLACEMENT  Right Shoulder        Social History     Tobacco Use    Smoking status: Never    Smokeless tobacco: Never   Substance Use Topics    Alcohol use: Yes     Alcohol/week: 5.0 standard drinks of alcohol     Types: 5 Cans of beer per week     Comment: 2 drinks twice per week       Family History   Problem Relation Age of Onset    Diabetes Sister     Evangelista Hyperthermia Neg Hx         Health Maintenance Due   Topic Date Due    TDAP/TD VACCINES (1 - Tdap) Never done    ZOSTER VACCINE (1 of 2) Never done        Current Outpatient Medications on File Prior to Visit   Medication Sig    acetaminophen (TYLENOL) 500 MG tablet Take 1 tablet by mouth Every 6 (Six) Hours As Needed for Mild Pain.    albuterol (PROVENTIL) (2.5 MG/3ML) 0.083% nebulizer solution Take 2.5 mg by nebulization Every 4 (Four) Hours As Needed for Wheezing.    albuterol sulfate  (90 Base) MCG/ACT inhaler INHALE 2 PUFFS INTO THE LUNGS EVERY 4 HOURS AS NEEDED FOR WHEEZING OR SHORTNESS OF AIR    sildenafil (Viagra) 25 MG tablet Take 1 tablet by mouth Daily As Needed for Erectile Dysfunction. Take 1 hour before sexual activity    [DISCONTINUED] ALPRAZolam (XANAX) 1 MG tablet Take 1 tablet by mouth Daily As Needed for Anxiety.    [DISCONTINUED] amLODIPine (NORVASC) 5 MG tablet Take 1 tablet by mouth Daily.    [DISCONTINUED] atorvastatin (LIPITOR) 20 MG tablet Take 1 tablet by mouth Every Night.    [DISCONTINUED] citalopram (CeleXA) 40 MG tablet TAKE 1 TABLET BY  MOUTH DAILY    [DISCONTINUED] glipizide (GLUCOTROL XL) 10 MG 24 hr tablet Take 2 tablets by mouth Daily.    [DISCONTINUED] hydroCHLOROthiazide 12.5 MG tablet TAKE 1 TABLET BY MOUTH DAILY    [DISCONTINUED] lisinopril (PRINIVIL,ZESTRIL) 40 MG tablet Take 1 tablet by mouth Daily.    [DISCONTINUED] metFORMIN (GLUCOPHAGE) 1000 MG tablet TAKE 1 TABLET BY MOUTH TWICE DAILY    [DISCONTINUED] Mounjaro 7.5 MG/0.5ML solution auto-injector ADMINISTER 7.5 MG UNDER THE SKIN 1 TIME EVERY WEEK AS DIRECTED    [DISCONTINUED] pregabalin (LYRICA) 50 MG capsule Take 1 capsule by mouth 2 (Two) Times a Day. Medication was already refilled under erroneous encounter, see other encounter from January 2, 2025 (Patient taking differently: Take 1 capsule by mouth 3 (Three) Times a Day. Medication was already refilled under erroneous encounter, see other encounter from January 2, 2025)     No current facility-administered medications on file prior to visit.       Immunization History   Administered Date(s) Administered    COVID-19 (MODERNA) 1st,2nd,3rd Dose Monovalent 03/30/2021, 05/11/2021    COVID-19 (PFIZER) 12YRS+ (COMIRNATY) 10/07/2024    COVID-19 (PFIZER) Purple Cap Monovalent 11/30/2021    Flu Vaccine Quad PF 6-35MO 11/10/2017    Fluzone  >6mos 10/07/2024    Fluzone (or Fluarix & Flulaval for VFC) >6mos 11/10/2017, 09/16/2021, 09/23/2022    Influenza, Unspecified 09/06/2023    Pneumococcal Conjugate 20-Valent (PCV20) 09/23/2022       Review of Systems   Constitutional: Negative.  Negative for fatigue and fever.   HENT: Negative.     Eyes: Negative.    Respiratory:  Negative for cough, shortness of breath and wheezing.    Cardiovascular:  Negative for chest pain, palpitations and leg swelling.   Gastrointestinal: Negative.  Negative for abdominal pain.   Genitourinary:  Negative for decreased urine volume, dysuria, frequency and nocturia.   Musculoskeletal:  Negative for gait problem.   Skin: Negative.    Neurological:  Negative for  "tremors, seizures, weakness, memory problem and confusion.   Psychiatric/Behavioral: Negative.  Negative for self-injury and suicidal ideas.         Objective     /84 (BP Location: Left arm, Patient Position: Sitting, Cuff Size: Adult) Comment: manual bp  Pulse 71   Temp 98.4 °F (36.9 °C) (Oral)   Ht 190.5 cm (75\")   Wt 118 kg (260 lb)   SpO2 96%   BMI 32.50 kg/m²       Physical Exam  Constitutional:       Appearance: Normal appearance.   Neck:      Vascular: No carotid bruit.   Cardiovascular:      Rate and Rhythm: Normal rate and regular rhythm.      Heart sounds: Normal heart sounds.   Pulmonary:      Effort: Pulmonary effort is normal.      Breath sounds: Normal breath sounds.   Musculoskeletal:         General: Normal range of motion.   Skin:     General: Skin is warm and dry.   Neurological:      General: No focal deficit present.      Mental Status: He is alert.   Psychiatric:         Mood and Affect: Mood normal.         Behavior: Behavior normal.         Result Review :     The following data was reviewed by: BILL Jennings on 04/16/2025:    Common labs          7/9/2024    07:56 10/7/2024    08:30 10/7/2024    08:45 1/20/2025    08:36   Common Labs   Glucose 172       BUN 12       Creatinine 0.94       Sodium 138       Potassium 4.7       Chloride 100       Calcium 9.2       Albumin 4.1       Total Bilirubin 0.6       Alkaline Phosphatase 71       AST (SGOT) 16       ALT (SGPT) 11       WBC 7.62       Hemoglobin 14.6       Hematocrit 42.9       Platelets 243       Total Cholesterol 111       Triglycerides 141       HDL Cholesterol 30       LDL Cholesterol  56       Hemoglobin A1C 7.70  6.70   7.6    Microalbumin, Urine 2.9   3.2     PSA 0.725         CMP          7/9/2024    07:56   CMP   Glucose 172    BUN 12    Creatinine 0.94    EGFR 91.1    Sodium 138    Potassium 4.7    Chloride 100    Calcium 9.2    Total Protein 6.9    Albumin 4.1    Globulin 2.8    Total Bilirubin 0.6  "   Alkaline Phosphatase 71    AST (SGOT) 16    ALT (SGPT) 11    Albumin/Globulin Ratio 1.5    BUN/Creatinine Ratio 12.8    Anion Gap 14.4      CBC w/diff          7/9/2024    07:56   CBC w/Diff   WBC 7.62    RBC 4.92    Hemoglobin 14.6    Hematocrit 42.9    MCV 87.2    MCH 29.7    MCHC 34.0    RDW 12.6    Platelets 243    Neutrophil Rel % 59.5    Immature Granulocyte Rel % 0.1    Lymphocyte Rel % 27.6    Monocyte Rel % 4.7    Eosinophil Rel % 7.7    Basophil Rel % 0.4      Lipid Panel          7/9/2024    07:56   Lipid Panel   Total Cholesterol 111    Triglycerides 141    HDL Cholesterol 30    VLDL Cholesterol 25    LDL Cholesterol  56    LDL/HDL Ratio 1.76      TSH          7/9/2024    07:56   TSH   TSH 1.820      A1C Last 3 Results          7/9/2024    07:56 10/7/2024    08:30 1/20/2025    08:36   HGBA1C Last 3 Results   Hemoglobin A1C 7.70  6.70  7.6      Microalbumin          7/9/2024    07:56 10/7/2024    08:45   Microalbumin   Microalbumin, Urine 2.9  3.2                     Results  Labs   - A1c: 7.6    Lab Results (last 24 hours)       Procedure Component Value Units Date/Time    Microalbumin / Creatinine Urine Ratio - Urine, Clean Catch [[id number]] Collected: 04/16/25 1545    Specimen: Urine, Clean Catch Updated: 04/16/25 1545    POC Medline 12 Panel Urine Drug Screen [[id number]] Collected: 04/16/25 1547    Specimen: Urine Updated: 04/16/25 1548     Amphetamine Screen, Urine Negative     Barbiturates Screen, Urine Negative     Buprenorphine, Screen, Urine Negative     Benzodiazepine Screen, Urine Negative     Cocaine Screen, Urine Negative     MDMA (ECSTASY) Negative     Methamphetamine, Ur Negative     Morphine/Opiates Screen, Urine Negative     Methadone Screen, Urine Negative     Oxycodone Screen, Urine Negative     Phencyclidine (PCP), Urine Negative     THC, Screen, Urine Negative     Lot Number [id number]     Expiration Date 10-14-26                     Assessment and Plan      Diagnoses and all orders  for this visit:    1. Annual physical exam (Primary)    2. Primary hypertension  -     Comprehensive Metabolic Panel; Future  -     CBC & Differential; Future  -     TSH; Future  -     hydroCHLOROthiazide 12.5 MG tablet; Take 1 tablet by mouth Daily.  Dispense: 90 tablet; Refill: 0  -     lisinopril (PRINIVIL,ZESTRIL) 40 MG tablet; Take 1 tablet by mouth Daily.  Dispense: 90 tablet; Refill: 1  -     amLODIPine (NORVASC) 5 MG tablet; Take 1 tablet by mouth Daily.  Dispense: 90 tablet; Refill: 1    3. Hyperlipidemia, unspecified hyperlipidemia type  -     Lipid Panel; Future  -     atorvastatin (LIPITOR) 20 MG tablet; Take 1 tablet by mouth Every Night.  Dispense: 90 tablet; Refill: 1    4. Type 2 diabetes mellitus with hyperglycemia, without long-term current use of insulin  -     Hemoglobin A1c; Future  -     Microalbumin / Creatinine Urine Ratio - Urine, Clean Catch; Future  -     metFORMIN (GLUCOPHAGE) 1000 MG tablet; Take 1 tablet by mouth 2 (Two) Times a Day.  Dispense: 180 tablet; Refill: 1  -     Tirzepatide (Mounjaro) 7.5 MG/0.5ML solution auto-injector; Inject 7.5 mg under the skin into the appropriate area as directed 1 (One) Time Per Week.  Dispense: 6 mL; Refill: 1  -     Microalbumin / Creatinine Urine Ratio - Urine, Clean Catch    5. Screening PSA (prostate specific antigen)  -     PSA Screen; Future    6. Type 2 diabetes mellitus with diabetic polyneuropathy, without long-term current use of insulin  -     glipizide (GLUCOTROL XL) 10 MG 24 hr tablet; Take 2 tablets by mouth Daily.  Dispense: 180 tablet; Refill: 1    7. Acute adjustment disorder with mixed anxiety and depressed mood  -     citalopram (CeleXA) 40 MG tablet; Take 1 tablet by mouth Daily.  Dispense: 90 tablet; Refill: 1  -     ALPRAZolam (XANAX) 1 MG tablet; Take 1 tablet by mouth Daily As Needed for Anxiety.  Dispense: 14 tablet; Refill: 2    8. Peripheral polyneuropathy  -     Discontinue: pregabalin (LYRICA) 75 MG capsule; Take 1  capsule by mouth 3 (Three) Times a Day. Medication was already refilled under erroneous encounter, see other encounter from January 2, 2025  Dispense: 270 capsule; Refill: 1  -     Discontinue: pregabalin (LYRICA) 75 MG capsule; Take 1 capsule by mouth 3 (Three) Times a Day. Medication was already refilled under erroneous encounter, see other encounter from January 2, 2025  Dispense: 270 capsule; Refill: 1  -     pregabalin (LYRICA) 75 MG capsule; Take 1 capsule by mouth 3 (Three) Times a Day. Medication was already refilled under erroneous encounter, see other encounter from January 2, 2025  Dispense: 270 capsule; Refill: 1    9. High risk medication use  -     POC Medline 12 Panel Urine Drug Screen    Other orders  -     Zoster Vac Recomb Adjuvanted 50 MCG/0.5ML reconstituted suspension; Inject 0.5 mL into the appropriate muscle as directed by prescriber 1 (One) Time for 1 dose.  Dispense: 1 each; Refill: 1        Assessment & Plan  1. Hypertension.  - Long-standing history of hypertension, currently managed with amlodipine 5 mg daily, lisinopril 40 mg daily, and hydrochlorothiazide 12.5 mg daily.  - Blood pressure was reported elevated today , needs to restart HCTZ   - Refill for hydrochlorothiazide will be provided. He will continue his current regimen of amlodipine and lisinopril.    2. Hyperlipidemia.  - On atorvastatin 20 mg daily for over 20 years.  - Due to his diabetes, continuation of atorvastatin indefinitely is recommended.  - No changes in medication are necessary.    3. Diabetes Mellitus.  - Diagnosed approximately 5 years ago, managed with glipizide 20 mg daily, metformin 1000 mg twice daily, and Mounjaro 7.5 mg once a week.  - Last A1c was 7.6, previously in the sixes.  - Order for labs, including A1c and urine microalbumin, has been placed today. Refill for Mounjaro will be provided.    4. Anxiety.  - Currently on citalopram 40 mg daily for anxiety and mood management.  - Requesting a refill of  alprazolam, which is used sparingly for work-related stress.  - Urine drug screen will be conducted today. Refill for alprazolam will be provided.    5. Peripheral Neuropathy.  - Reports mild numbness in feet but retains good sensation. Improvement noted since starting pregabalin 75 mg three times a day.  - Continues to take pregabalin 75 mg three times a day without side effects.  - No changes in medication are necessary Refill given. UDS , consent and Alexander reviewed today .    6. Asthma.  - One refill remaining for his inhaler.      7. Health Maintenance.  - Has not received tetanus or shingles vaccines.  - Shingles vaccine will be sent to pharmacy today             Follow Up     Return in about 6 months (around 10/16/2025) for Recheck.    Patient was given instructions and counseling regarding his condition or for health maintenance advice. Please see specific information pulled into the AVS if appropriate.     Patient or patient representative verbalized consent for the use of Ambient Listening during the visit with  BILL Jennings for chart documentation. 4/16/2025  15:32 EDT

## 2025-04-17 LAB
ALBUMIN UR-MCNC: 2.3 MG/DL
CREAT UR-MCNC: 81.2 MG/DL
MICROALBUMIN/CREAT UR: 28.3 MG/G (ref 0–29)

## 2025-05-06 ENCOUNTER — TELEPHONE (OUTPATIENT)
Dept: FAMILY MEDICINE CLINIC | Age: 65
End: 2025-05-06
Payer: COMMERCIAL

## 2025-05-06 NOTE — TELEPHONE ENCOUNTER
LMTRC regarding overdue labs ordered on 4-16-25. 1st attempt  Hub to relay: Patient has overdue lab orders in chart

## 2025-05-12 DIAGNOSIS — J45.20 MILD INTERMITTENT ASTHMA WITHOUT COMPLICATION: ICD-10-CM

## 2025-05-12 RX ORDER — ALBUTEROL SULFATE 90 UG/1
INHALANT RESPIRATORY (INHALATION)
Qty: 18 G | Refills: 2 | Status: SHIPPED | OUTPATIENT
Start: 2025-05-12

## 2025-05-13 DIAGNOSIS — E11.42 TYPE 2 DIABETES MELLITUS WITH DIABETIC POLYNEUROPATHY, WITHOUT LONG-TERM CURRENT USE OF INSULIN: ICD-10-CM

## 2025-05-13 DIAGNOSIS — F43.23 ACUTE ADJUSTMENT DISORDER WITH MIXED ANXIETY AND DEPRESSED MOOD: ICD-10-CM

## 2025-05-13 RX ORDER — GLIPIZIDE 10 MG/1
20 TABLET, FILM COATED, EXTENDED RELEASE ORAL DAILY
Qty: 180 TABLET | Refills: 1 | Status: CANCELLED | OUTPATIENT
Start: 2025-05-13

## 2025-05-13 RX ORDER — ALPRAZOLAM 1 MG/1
1 TABLET ORAL DAILY PRN
Qty: 14 TABLET | Refills: 2 | OUTPATIENT
Start: 2025-05-13

## 2025-06-10 ENCOUNTER — PRIOR AUTHORIZATION (OUTPATIENT)
Dept: FAMILY MEDICINE CLINIC | Age: 65
End: 2025-06-10
Payer: COMMERCIAL

## 2025-06-10 DIAGNOSIS — G62.9 PERIPHERAL POLYNEUROPATHY: ICD-10-CM

## 2025-06-10 DIAGNOSIS — E11.65 TYPE 2 DIABETES MELLITUS WITH HYPERGLYCEMIA, WITHOUT LONG-TERM CURRENT USE OF INSULIN: ICD-10-CM

## 2025-06-10 RX ORDER — TIRZEPATIDE 7.5 MG/.5ML
7.5 INJECTION, SOLUTION SUBCUTANEOUS WEEKLY
Qty: 2 ML | Refills: 0 | Status: SHIPPED | OUTPATIENT
Start: 2025-06-10

## 2025-06-10 RX ORDER — PREGABALIN 75 MG/1
75 CAPSULE ORAL 3 TIMES DAILY
Qty: 90 CAPSULE | Refills: 0 | Status: SHIPPED | OUTPATIENT
Start: 2025-06-10

## 2025-06-10 NOTE — TELEPHONE ENCOUNTER
"    Caller: Collin Sandoval \"SHAWANDA\"    Relationship: Self    Best call back number: [phone]     Requested Prescriptions:   Requested Prescriptions     Pending Prescriptions Disp Refills    Tirzepatide (Mounjaro) 7.5 MG/0.5ML solution auto-injector 6 mL 1     Sig: Inject 7.5 mg under the skin into the appropriate area as directed 1 (One) Time Per Week.    pregabalin (LYRICA) 75 MG capsule 270 capsule 1     Sig: Take 1 capsule by mouth 3 (Three) Times a Day. Medication was already refilled under erroneous encounter, see other encounter from January 2, 2025        Pharmacy where request should be sent: [facility]     Last office visit with prescribing clinician: 4/16/2025   Last telemedicine visit with prescribing clinician: Visit date not found   Next office visit with prescribing clinician: 10/17/2025     Additional details provided by patient: PATIENT STATES HE IS COMPLETELY OUT OF THE     Tirzepatide (Mounjaro) 7.5 MG/0.5ML solution auto-injector       Does the patient have less than a 3 day supply:  [x] Yes  [] No    Jase Puckett Rep   06/10/25 09:35 EDT       "

## 2025-06-10 NOTE — TELEPHONE ENCOUNTER
Rx Refill Note  Requested Prescriptions     Pending Prescriptions Disp Refills    Tirzepatide (Mounjaro) 7.5 MG/0.5ML solution auto-injector 6 mL 1     Sig: Inject 7.5 mg under the skin into the appropriate area as directed 1 (One) Time Per Week.    pregabalin (LYRICA) 75 MG capsule 270 capsule 1     Sig: Take 1 capsule by mouth 3 (Three) Times a Day. Medication was already refilled under erroneous encounter, see other encounter from January 2, 2025      Last office visit with prescribing clinician: 4/16/2025   Last telemedicine visit with prescribing clinician: Visit date not found   Next office visit with prescribing clinician: 10/17/2025   {    Sabine Soto LPN  06/10/25, 09:41 EDT      Mounjaro LF-4/16/25 #6ML, RF-1  LYRICA LF-4/16/25 #270, RF-1    POC Medline 12 Panel Urine Drug Screen (04/16/2025 15:47)     Hemoglobin A1c (10/07/2024 08:30)     HAS NOT COMPLETED LABS ORD 4/16/25 TO UPDATE A1C

## 2025-06-13 ENCOUNTER — LAB (OUTPATIENT)
Dept: LAB | Facility: HOSPITAL | Age: 65
End: 2025-06-13
Payer: COMMERCIAL

## 2025-06-13 ENCOUNTER — TELEPHONE (OUTPATIENT)
Dept: FAMILY MEDICINE CLINIC | Age: 65
End: 2025-06-13
Payer: COMMERCIAL

## 2025-06-13 ENCOUNTER — RESULTS FOLLOW-UP (OUTPATIENT)
Dept: LAB | Facility: HOSPITAL | Age: 65
End: 2025-06-13
Payer: COMMERCIAL

## 2025-06-13 DIAGNOSIS — I10 PRIMARY HYPERTENSION: ICD-10-CM

## 2025-06-13 DIAGNOSIS — E11.65 TYPE 2 DIABETES MELLITUS WITH HYPERGLYCEMIA, WITHOUT LONG-TERM CURRENT USE OF INSULIN: ICD-10-CM

## 2025-06-13 DIAGNOSIS — E78.5 HYPERLIPIDEMIA, UNSPECIFIED HYPERLIPIDEMIA TYPE: ICD-10-CM

## 2025-06-13 LAB
ALBUMIN SERPL-MCNC: 4 G/DL (ref 3.5–5.2)
ALBUMIN/GLOB SERPL: 1.3 G/DL
ALP SERPL-CCNC: 82 U/L (ref 39–117)
ALT SERPL W P-5'-P-CCNC: 12 U/L (ref 1–41)
ANION GAP SERPL CALCULATED.3IONS-SCNC: 10.2 MMOL/L (ref 5–15)
AST SERPL-CCNC: 20 U/L (ref 1–40)
BASOPHILS # BLD AUTO: 0.04 10*3/MM3 (ref 0–0.2)
BASOPHILS NFR BLD AUTO: 0.5 % (ref 0–1.5)
BILIRUB SERPL-MCNC: 0.7 MG/DL (ref 0–1.2)
BUN SERPL-MCNC: 10 MG/DL (ref 8–23)
BUN/CREAT SERPL: 8.9 (ref 7–25)
CALCIUM SPEC-SCNC: 9.2 MG/DL (ref 8.6–10.5)
CHLORIDE SERPL-SCNC: 104 MMOL/L (ref 98–107)
CHOLEST SERPL-MCNC: 103 MG/DL (ref 0–200)
CO2 SERPL-SCNC: 23.8 MMOL/L (ref 22–29)
CREAT SERPL-MCNC: 1.12 MG/DL (ref 0.76–1.27)
DEPRECATED RDW RBC AUTO: 43.3 FL (ref 37–54)
EGFRCR SERPLBLD CKD-EPI 2021: 73.4 ML/MIN/1.73
EOSINOPHIL # BLD AUTO: 0.52 10*3/MM3 (ref 0–0.4)
EOSINOPHIL NFR BLD AUTO: 6.9 % (ref 0.3–6.2)
ERYTHROCYTE [DISTWIDTH] IN BLOOD BY AUTOMATED COUNT: 13 % (ref 12.3–15.4)
GLOBULIN UR ELPH-MCNC: 3.1 GM/DL
GLUCOSE SERPL-MCNC: 152 MG/DL (ref 65–99)
HBA1C MFR BLD: 6.3 % (ref 4.8–5.6)
HCT VFR BLD AUTO: 44.7 % (ref 37.5–51)
HDLC SERPL-MCNC: 29 MG/DL (ref 40–60)
HGB BLD-MCNC: 14.8 G/DL (ref 13–17.7)
IMM GRANULOCYTES # BLD AUTO: 0.01 10*3/MM3 (ref 0–0.05)
IMM GRANULOCYTES NFR BLD AUTO: 0.1 % (ref 0–0.5)
LDLC SERPL CALC-MCNC: 55 MG/DL (ref 0–100)
LDLC/HDLC SERPL: 1.87 {RATIO}
LYMPHOCYTES # BLD AUTO: 2.28 10*3/MM3 (ref 0.7–3.1)
LYMPHOCYTES NFR BLD AUTO: 30.2 % (ref 19.6–45.3)
MCH RBC QN AUTO: 29.7 PG (ref 26.6–33)
MCHC RBC AUTO-ENTMCNC: 33.1 G/DL (ref 31.5–35.7)
MCV RBC AUTO: 89.6 FL (ref 79–97)
MONOCYTES # BLD AUTO: 0.44 10*3/MM3 (ref 0.1–0.9)
MONOCYTES NFR BLD AUTO: 5.8 % (ref 5–12)
NEUTROPHILS NFR BLD AUTO: 4.27 10*3/MM3 (ref 1.7–7)
NEUTROPHILS NFR BLD AUTO: 56.5 % (ref 42.7–76)
PLATELET # BLD AUTO: 225 10*3/MM3 (ref 140–450)
PMV BLD AUTO: 10.8 FL (ref 6–12)
POTASSIUM SERPL-SCNC: 4.7 MMOL/L (ref 3.5–5.2)
PROT SERPL-MCNC: 7.1 G/DL (ref 6–8.5)
RBC # BLD AUTO: 4.99 10*6/MM3 (ref 4.14–5.8)
SODIUM SERPL-SCNC: 138 MMOL/L (ref 136–145)
TRIGL SERPL-MCNC: 99 MG/DL (ref 0–150)
TSH SERPL DL<=0.05 MIU/L-ACNC: 1.65 UIU/ML (ref 0.27–4.2)
VLDLC SERPL-MCNC: 19 MG/DL (ref 5–40)
WBC NRBC COR # BLD AUTO: 7.56 10*3/MM3 (ref 3.4–10.8)

## 2025-06-13 PROCEDURE — 83036 HEMOGLOBIN GLYCOSYLATED A1C: CPT

## 2025-06-13 PROCEDURE — 80061 LIPID PANEL: CPT

## 2025-06-13 PROCEDURE — 80050 GENERAL HEALTH PANEL: CPT

## 2025-06-13 PROCEDURE — 36415 COLL VENOUS BLD VENIPUNCTURE: CPT

## 2025-06-13 NOTE — TELEPHONE ENCOUNTER
PATIENT CALLED TO CHECK THE STATUS OF THIS REQUEST.  HE IS OUT OF MEDICATION AND HE IS DUE FOR A SHOT TOMORROW.

## 2025-07-02 DIAGNOSIS — F43.23 ACUTE ADJUSTMENT DISORDER WITH MIXED ANXIETY AND DEPRESSED MOOD: ICD-10-CM

## 2025-07-02 DIAGNOSIS — J45.20 MILD INTERMITTENT ASTHMA WITHOUT COMPLICATION: ICD-10-CM

## 2025-07-02 DIAGNOSIS — E11.65 TYPE 2 DIABETES MELLITUS WITH HYPERGLYCEMIA, WITHOUT LONG-TERM CURRENT USE OF INSULIN: ICD-10-CM

## 2025-07-02 RX ORDER — ALBUTEROL SULFATE 90 UG/1
1 INHALANT RESPIRATORY (INHALATION) EVERY 4 HOURS PRN
Qty: 18 G | Refills: 2 | Status: SHIPPED | OUTPATIENT
Start: 2025-07-02

## 2025-07-02 RX ORDER — ALPRAZOLAM 1 MG/1
1 TABLET ORAL DAILY PRN
Qty: 14 TABLET | Refills: 2 | Status: SHIPPED | OUTPATIENT
Start: 2025-07-02

## 2025-07-02 RX ORDER — TIRZEPATIDE 7.5 MG/.5ML
7.5 INJECTION, SOLUTION SUBCUTANEOUS WEEKLY
Qty: 2 ML | Refills: 0 | Status: CANCELLED | OUTPATIENT
Start: 2025-07-02

## 2025-07-02 NOTE — TELEPHONE ENCOUNTER
"    Caller: JaimeCollin \"SHAWANDA\"    Relationship: Self    Best call back number: [phone]     Requested Prescriptions:   Requested Prescriptions     Pending Prescriptions Disp Refills    Tirzepatide (Mounjaro) 7.5 MG/0.5ML solution auto-injector 2 mL 0     Sig: Inject 7.5 mg under the skin into the appropriate area as directed 1 (One) Time Per Week. Patient needs to complete labs.    albuterol sulfate  (90 Base) MCG/ACT inhaler 18 g 2    ALPRAZolam (XANAX) 1 MG tablet 14 tablet 2     Sig: Take 1 tablet by mouth Daily As Needed for Anxiety.        Pharmacy where request should be sent: [facility]     Last office visit with prescribing clinician: 4/16/2025   Last telemedicine visit with prescribing clinician: Visit date not found   Next office visit with prescribing clinician: 10/17/2025     Additional details provided by patient: PATIENT REQUESTS 90 REFILL ON THE MOUNJARO    Does the patient have less than a 3 day supply:  [] Yes  [x] No    Would you like a call back once the refill request has been completed: [] Yes [] No    If the office needs to give you a call back, can they leave a voicemail: [] Yes [] No    Jase Joe   07/02/25 10:14 EDT         "

## 2025-07-02 NOTE — TELEPHONE ENCOUNTER
Rx Refill Note  Requested Prescriptions     Pending Prescriptions Disp Refills    albuterol sulfate  (90 Base) MCG/ACT inhaler 18 g 2    ALPRAZolam (XANAX) 1 MG tablet 14 tablet 2     Sig: Take 1 tablet by mouth Daily As Needed for Anxiety.      Last office visit with prescribing clinician: 4/16/2025   Last telemedicine visit with prescribing clinician: Visit date not found   Next office visit with prescribing clinician: 10/17/2025       Sabine Soto LPN  07/02/25, 10:49 EDT    ALBUTEROL LF BY AW, NOT FILLED BY YOU.    XANAX LF-4/16/25 #14, RF-2    POC Medline 12 Panel Urine Drug Screen (04/16/2025 15:47)

## 2025-07-03 DIAGNOSIS — E11.65 TYPE 2 DIABETES MELLITUS WITH HYPERGLYCEMIA, WITHOUT LONG-TERM CURRENT USE OF INSULIN: ICD-10-CM

## 2025-07-03 RX ORDER — TIRZEPATIDE 7.5 MG/.5ML
7.5 INJECTION, SOLUTION SUBCUTANEOUS WEEKLY
Qty: 2 ML | Refills: 0 | Status: CANCELLED | OUTPATIENT
Start: 2025-07-03

## 2025-07-03 RX ORDER — TIRZEPATIDE 7.5 MG/.5ML
7.5 INJECTION, SOLUTION SUBCUTANEOUS WEEKLY
Qty: 5 ML | Refills: 0 | Status: SHIPPED | OUTPATIENT
Start: 2025-07-03

## 2025-07-07 DIAGNOSIS — E11.65 TYPE 2 DIABETES MELLITUS WITH HYPERGLYCEMIA, WITHOUT LONG-TERM CURRENT USE OF INSULIN: ICD-10-CM

## 2025-07-09 DIAGNOSIS — E11.65 TYPE 2 DIABETES MELLITUS WITH HYPERGLYCEMIA, WITHOUT LONG-TERM CURRENT USE OF INSULIN: ICD-10-CM

## 2025-07-09 RX ORDER — TIRZEPATIDE 7.5 MG/.5ML
7.5 INJECTION, SOLUTION SUBCUTANEOUS WEEKLY
Qty: 6 ML | Refills: 0 | Status: SHIPPED | OUTPATIENT
Start: 2025-07-09 | End: 2025-07-09 | Stop reason: SDUPTHER

## 2025-07-09 RX ORDER — TIRZEPATIDE 7.5 MG/.5ML
7.5 INJECTION, SOLUTION SUBCUTANEOUS WEEKLY
Qty: 2 ML | Refills: 0 | Status: CANCELLED | OUTPATIENT
Start: 2025-07-09

## 2025-07-09 RX ORDER — TIRZEPATIDE 7.5 MG/.5ML
7.5 INJECTION, SOLUTION SUBCUTANEOUS WEEKLY
Qty: 6 ML | Refills: 0 | Status: SHIPPED | OUTPATIENT
Start: 2025-07-09

## 2025-07-09 RX ORDER — TIRZEPATIDE 7.5 MG/.5ML
7.5 INJECTION, SOLUTION SUBCUTANEOUS WEEKLY
Qty: 2 ML | Refills: 0 | OUTPATIENT
Start: 2025-07-09

## 2025-07-18 DIAGNOSIS — G62.9 PERIPHERAL POLYNEUROPATHY: ICD-10-CM

## 2025-07-18 RX ORDER — PREGABALIN 75 MG/1
75 CAPSULE ORAL 3 TIMES DAILY
Qty: 270 CAPSULE | Refills: 1 | Status: SHIPPED | OUTPATIENT
Start: 2025-07-18

## 2025-07-18 NOTE — TELEPHONE ENCOUNTER
Rx Refill Note  Requested Prescriptions     Pending Prescriptions Disp Refills    pregabalin (LYRICA) 75 MG capsule 90 capsule 0     Sig: Take 1 capsule by mouth 3 (Three) Times a Day. Needs to complete labs for further refills      Last office visit with prescribing clinician: 4/16/2025     Next office visit with prescribing clinician: 10/17/2025    Last filled 6/13/25  #90    POC Medline 12 Panel Urine Drug Screen (04/16/2025 15:47)     Leidy Kim LPN  07/18/25, 10:20 EDT

## 2025-08-04 ENCOUNTER — TELEPHONE (OUTPATIENT)
Dept: FAMILY MEDICINE CLINIC | Age: 65
End: 2025-08-04
Payer: COMMERCIAL

## 2025-08-22 ENCOUNTER — LAB (OUTPATIENT)
Dept: LAB | Facility: HOSPITAL | Age: 65
End: 2025-08-22
Payer: COMMERCIAL

## (undated) DEVICE — PREP SOL POVIDONE/IODINE BT 4OZ

## (undated) DEVICE — SOL ISO/ALC RUB 70PCT 4OZ

## (undated) DEVICE — DRSNG TELFA PAD NONADH STR 1S 3X8IN

## (undated) DEVICE — DUAL CUT SAGITTAL BLADE

## (undated) DEVICE — DRAPE,U/ SHT,SPLIT,PLAS,STERIL: Brand: MEDLINE

## (undated) DEVICE — TBG PENCL TELESCP MEGADYNE SMOKE EVAC 10FT

## (undated) DEVICE — PIN STNMAN 3.2MM 9IN
Type: IMPLANTABLE DEVICE | Site: SHOULDER | Status: NON-FUNCTIONAL
Removed: 2023-01-10

## (undated) DEVICE — DRSNG SURESITE WNDW 4X4.5

## (undated) DEVICE — SUT VIC 2/0 CT2 27IN J269H

## (undated) DEVICE — STRIP,CLOSURE,WOUND,MEDI-STRIP,1/2X4: Brand: MEDLINE

## (undated) DEVICE — PK SHLDR OPN 40

## (undated) DEVICE — MARKR SKIN W/RULR AND LBL

## (undated) DEVICE — SUT SILK 2/0 TIES 18IN A185H

## (undated) DEVICE — GLV SURG BIOGEL LTX PF 8 1/2

## (undated) DEVICE — STCKNT IMPERV 12IN STRL

## (undated) DEVICE — GLV SURG SIGNATURE ESSENTIAL PF LTX SZ8.5

## (undated) DEVICE — IMMOB SHLDR PAD2 UNIV LG

## (undated) DEVICE — BNDG ELAS CO-FLEX SLF ADHR 4IN5YD LF STRL

## (undated) DEVICE — MAT FLR ABSORBENT LG 4FT 10 2.5FT

## (undated) DEVICE — SOL IRR NACL 0.9PCT 3000ML

## (undated) DEVICE — 3M™ IOBAN™ 2 ANTIMICROBIAL INCISE DRAPE 6640EZ: Brand: IOBAN™ 2

## (undated) DEVICE — SKIN PREP TRAY W/CHG: Brand: MEDLINE INDUSTRIES, INC.

## (undated) DEVICE — APPL CHLORAPREP HI/LITE 26ML ORNG

## (undated) DEVICE — DRAPE,SHOULDER,BEACH ULTRAGARD: Brand: MEDLINE

## (undated) DEVICE — TRAP FLD MINIVAC MEGADYNE 100ML